# Patient Record
Sex: MALE | Race: WHITE | Employment: OTHER | URBAN - METROPOLITAN AREA
[De-identification: names, ages, dates, MRNs, and addresses within clinical notes are randomized per-mention and may not be internally consistent; named-entity substitution may affect disease eponyms.]

---

## 2018-04-13 ENCOUNTER — HOSPITAL ENCOUNTER (OUTPATIENT)
Age: 68
Discharge: HOME | End: 2018-04-15
Payer: COMMERCIAL

## 2018-04-13 LAB
INR BLD: 2.1
PROTHROMBIN TIME: 24.5 SEC (ref 9.3–12.4)

## 2018-04-13 PROCEDURE — 85610 PROTHROMBIN TIME: CPT

## 2018-06-01 ENCOUNTER — HOSPITAL ENCOUNTER (OUTPATIENT)
Age: 68
Discharge: HOME | End: 2018-06-03
Payer: MEDICARE

## 2018-06-01 LAB
INR BLD: 2.5
PROTHROMBIN TIME: 27.9 SEC (ref 9.3–12.4)

## 2018-06-01 PROCEDURE — 85610 PROTHROMBIN TIME: CPT

## 2018-07-09 ENCOUNTER — HOSPITAL ENCOUNTER (OUTPATIENT)
Age: 68
Discharge: HOME | End: 2018-07-11
Payer: MEDICARE

## 2018-07-09 LAB
INR BLD: 2.9
PROTHROMBIN TIME: 33.3 SEC (ref 9.3–12.4)

## 2018-07-09 PROCEDURE — 85610 PROTHROMBIN TIME: CPT

## 2018-08-17 ENCOUNTER — HOSPITAL ENCOUNTER (OUTPATIENT)
Age: 68
Discharge: HOME | End: 2018-08-19
Payer: MEDICARE

## 2018-08-17 LAB
INR BLD: 3.3
PROTHROMBIN TIME: 37 SEC (ref 9.3–12.4)

## 2018-08-17 PROCEDURE — 85610 PROTHROMBIN TIME: CPT

## 2018-09-07 ENCOUNTER — HOSPITAL ENCOUNTER (OUTPATIENT)
Age: 68
Discharge: HOME | End: 2018-09-09
Payer: MEDICARE

## 2018-09-07 LAB
ALBUMIN SERPL-MCNC: 4.2 G/DL (ref 3.5–5.2)
ALP BLD-CCNC: 66 U/L (ref 40–129)
ALT SERPL-CCNC: 23 U/L (ref 0–40)
ANION GAP SERPL CALCULATED.3IONS-SCNC: 20 MMOL/L (ref 7–16)
AST SERPL-CCNC: 37 U/L (ref 0–39)
BASOPHILS ABSOLUTE: 0.03 E9/L (ref 0–0.2)
BASOPHILS RELATIVE PERCENT: 0.6 % (ref 0–2)
BILIRUB SERPL-MCNC: 0.7 MG/DL (ref 0–1.2)
BILIRUBIN URINE: NEGATIVE
BLOOD, URINE: NEGATIVE
BUN BLDV-MCNC: 11 MG/DL (ref 8–23)
CALCIUM SERPL-MCNC: 9 MG/DL (ref 8.6–10.2)
CHLORIDE BLD-SCNC: 94 MMOL/L (ref 98–107)
CHOLESTEROL, TOTAL: 170 MG/DL (ref 0–199)
CLARITY: CLEAR
CO2: 21 MMOL/L (ref 22–29)
COLOR: NORMAL
CREAT SERPL-MCNC: 1 MG/DL (ref 0.7–1.2)
EOSINOPHILS ABSOLUTE: 0.07 E9/L (ref 0.05–0.5)
EOSINOPHILS RELATIVE PERCENT: 1.5 % (ref 0–6)
GFR AFRICAN AMERICAN: >60
GFR NON-AFRICAN AMERICAN: >60 ML/MIN/1.73
GLUCOSE BLD-MCNC: 85 MG/DL (ref 74–109)
GLUCOSE URINE: NEGATIVE MG/DL
HCT VFR BLD CALC: 47.8 % (ref 37–54)
HDLC SERPL-MCNC: 63 MG/DL
HEMOGLOBIN: 15.6 G/DL (ref 12.5–16.5)
IMMATURE GRANULOCYTES #: 0.02 E9/L
IMMATURE GRANULOCYTES %: 0.4 % (ref 0–5)
INR BLD: 2.8
KETONES, URINE: NEGATIVE MG/DL
LDL CHOLESTEROL CALCULATED: 88 MG/DL (ref 0–99)
LEUKOCYTE ESTERASE, URINE: NEGATIVE
LYMPHOCYTES ABSOLUTE: 1.09 E9/L (ref 1.5–4)
LYMPHOCYTES RELATIVE PERCENT: 23.2 % (ref 20–42)
MCH RBC QN AUTO: 29.1 PG (ref 26–35)
MCHC RBC AUTO-ENTMCNC: 32.6 % (ref 32–34.5)
MCV RBC AUTO: 89.2 FL (ref 80–99.9)
MONOCYTES ABSOLUTE: 0.82 E9/L (ref 0.1–0.95)
MONOCYTES RELATIVE PERCENT: 17.4 % (ref 2–12)
NEUTROPHILS ABSOLUTE: 2.67 E9/L (ref 1.8–7.3)
NEUTROPHILS RELATIVE PERCENT: 56.9 % (ref 43–80)
NITRITE, URINE: NEGATIVE
PDW BLD-RTO: 13.9 FL (ref 11.5–15)
PH UA: 5.5 (ref 5–9)
PLATELET # BLD: 243 E9/L (ref 130–450)
PMV BLD AUTO: 9.9 FL (ref 7–12)
POTASSIUM SERPL-SCNC: 5.2 MMOL/L (ref 3.5–5)
PROSTATE SPECIFIC ANTIGEN: 0.27 NG/ML (ref 0–4)
PROTEIN UA: NEGATIVE MG/DL
PROTHROMBIN TIME: 31.8 SEC (ref 9.3–12.4)
RBC # BLD: 5.36 E12/L (ref 3.8–5.8)
SODIUM BLD-SCNC: 135 MMOL/L (ref 132–146)
SPECIFIC GRAVITY UA: 1.02 (ref 1–1.03)
TOTAL PROTEIN: 6.7 G/DL (ref 6.4–8.3)
TRIGL SERPL-MCNC: 94 MG/DL (ref 0–149)
TSH SERPL DL<=0.05 MIU/L-ACNC: 1.38 UIU/ML (ref 0.27–4.2)
UROBILINOGEN, URINE: 0.2 E.U./DL
VLDLC SERPL CALC-MCNC: 19 MG/DL
WBC # BLD: 4.7 E9/L (ref 4.5–11.5)

## 2018-09-07 PROCEDURE — 84443 ASSAY THYROID STIM HORMONE: CPT

## 2018-09-07 PROCEDURE — 80061 LIPID PANEL: CPT

## 2018-09-07 PROCEDURE — 81003 URINALYSIS AUTO W/O SCOPE: CPT

## 2018-09-07 PROCEDURE — 80053 COMPREHEN METABOLIC PANEL: CPT

## 2018-09-07 PROCEDURE — G0103 PSA SCREENING: HCPCS

## 2018-09-07 PROCEDURE — 85025 COMPLETE CBC W/AUTO DIFF WBC: CPT

## 2018-09-07 PROCEDURE — 85610 PROTHROMBIN TIME: CPT

## 2018-09-28 ENCOUNTER — HOSPITAL ENCOUNTER (OUTPATIENT)
Age: 68
Discharge: HOME | End: 2018-09-30
Payer: MEDICARE

## 2018-09-28 LAB
ANION GAP SERPL CALCULATED.3IONS-SCNC: 16 MMOL/L (ref 7–16)
BUN BLDV-MCNC: 10 MG/DL (ref 8–23)
CALCIUM SERPL-MCNC: 9 MG/DL (ref 8.6–10.2)
CHLORIDE BLD-SCNC: 96 MMOL/L (ref 98–107)
CO2: 25 MMOL/L (ref 22–29)
CREAT SERPL-MCNC: 0.9 MG/DL (ref 0.7–1.2)
GFR AFRICAN AMERICAN: >60
GFR NON-AFRICAN AMERICAN: >60 ML/MIN/1.73
GLUCOSE BLD-MCNC: 95 MG/DL (ref 74–109)
INR BLD: 2
POTASSIUM SERPL-SCNC: 5.2 MMOL/L (ref 3.5–5)
PROTHROMBIN TIME: 23.2 SEC (ref 9.3–12.4)
SODIUM BLD-SCNC: 137 MMOL/L (ref 132–146)

## 2018-09-28 PROCEDURE — 80048 BASIC METABOLIC PNL TOTAL CA: CPT

## 2018-09-28 PROCEDURE — 85610 PROTHROMBIN TIME: CPT

## 2018-10-26 ENCOUNTER — HOSPITAL ENCOUNTER (OUTPATIENT)
Age: 68
Discharge: HOME | End: 2018-10-28
Payer: MEDICARE

## 2018-10-26 LAB
INR BLD: 2.9
PROTHROMBIN TIME: 31.9 SEC (ref 9.3–12.4)

## 2018-10-26 PROCEDURE — 85610 PROTHROMBIN TIME: CPT

## 2018-12-10 ENCOUNTER — HOSPITAL ENCOUNTER (OUTPATIENT)
Age: 68
Discharge: HOME | End: 2018-12-12
Payer: MEDICARE

## 2018-12-10 LAB
INR BLD: 3.9
PROTHROMBIN TIME: 43.6 SEC (ref 9.3–12.4)

## 2018-12-10 PROCEDURE — 85610 PROTHROMBIN TIME: CPT

## 2018-12-20 ENCOUNTER — HOSPITAL ENCOUNTER (OUTPATIENT)
Age: 68
Discharge: HOME | End: 2018-12-22
Payer: MEDICARE

## 2018-12-20 LAB
INR BLD: 1.9
PROTHROMBIN TIME: 20.8 SEC (ref 9.3–12.4)

## 2018-12-20 PROCEDURE — 85610 PROTHROMBIN TIME: CPT

## 2019-01-04 ENCOUNTER — HOSPITAL ENCOUNTER (OUTPATIENT)
Age: 69
Discharge: HOME | End: 2019-01-06
Payer: MEDICARE

## 2019-01-04 LAB
INR BLD: 3.2
PROTHROMBIN TIME: 36.2 SEC (ref 9.3–12.4)

## 2019-01-04 PROCEDURE — 85610 PROTHROMBIN TIME: CPT

## 2019-01-25 ENCOUNTER — HOSPITAL ENCOUNTER (OUTPATIENT)
Age: 69
Discharge: HOME | End: 2019-01-27
Payer: MEDICARE

## 2019-01-25 LAB
INR BLD: 2.6
PROTHROMBIN TIME: 29.5 SEC (ref 9.3–12.4)

## 2019-01-25 PROCEDURE — 85610 PROTHROMBIN TIME: CPT

## 2019-02-22 ENCOUNTER — HOSPITAL ENCOUNTER (OUTPATIENT)
Age: 69
Discharge: HOME | End: 2019-02-24
Payer: MEDICARE

## 2019-02-22 LAB
INR BLD: 3
PROTHROMBIN TIME: 33.1 SEC (ref 9.3–12.4)

## 2019-02-22 PROCEDURE — 85610 PROTHROMBIN TIME: CPT

## 2019-04-01 ENCOUNTER — HOSPITAL ENCOUNTER (OUTPATIENT)
Age: 69
Discharge: HOME | End: 2019-04-03
Payer: MEDICARE

## 2019-04-01 LAB
INR BLD: 2.6
PROTHROMBIN TIME: 29.1 SEC (ref 9.3–12.4)

## 2019-04-01 PROCEDURE — 85610 PROTHROMBIN TIME: CPT

## 2019-05-06 ENCOUNTER — HOSPITAL ENCOUNTER (OUTPATIENT)
Age: 69
Discharge: HOME | End: 2019-05-08
Payer: MEDICARE

## 2019-05-06 LAB
INR BLD: 3
PROTHROMBIN TIME: 33.4 SEC (ref 9.3–12.4)

## 2019-05-06 PROCEDURE — 85610 PROTHROMBIN TIME: CPT

## 2019-06-07 ENCOUNTER — HOSPITAL ENCOUNTER (OUTPATIENT)
Age: 69
Discharge: HOME | End: 2019-06-09
Payer: MEDICARE

## 2019-06-07 LAB
INR BLD: 2.3
PROTHROMBIN TIME: 26.7 SEC (ref 9.3–12.4)

## 2019-06-07 PROCEDURE — 85610 PROTHROMBIN TIME: CPT

## 2021-06-02 ENCOUNTER — OFFICE VISIT (OUTPATIENT)
Dept: NEUROLOGY | Age: 71
End: 2021-06-02
Payer: MEDICARE

## 2021-06-02 ENCOUNTER — TELEPHONE (OUTPATIENT)
Dept: NEUROLOGY | Age: 71
End: 2021-06-02

## 2021-06-02 VITALS
DIASTOLIC BLOOD PRESSURE: 82 MMHG | RESPIRATION RATE: 20 BRPM | HEART RATE: 67 BPM | HEIGHT: 70 IN | OXYGEN SATURATION: 98 % | SYSTOLIC BLOOD PRESSURE: 139 MMHG | BODY MASS INDEX: 23.62 KG/M2 | WEIGHT: 165 LBS | TEMPERATURE: 97.4 F

## 2021-06-02 DIAGNOSIS — F10.20 ALCOHOLISM (HCC): ICD-10-CM

## 2021-06-02 DIAGNOSIS — G20 PARKINSON'S DISEASE (HCC): Primary | ICD-10-CM

## 2021-06-02 PROBLEM — G20.A1 PARKINSON'S DISEASE: Status: ACTIVE | Noted: 2021-06-02

## 2021-06-02 PROCEDURE — 99204 OFFICE O/P NEW MOD 45 MIN: CPT | Performed by: NURSE PRACTITIONER

## 2021-06-02 RX ORDER — VERAPAMIL HYDROCHLORIDE 300 MG/1
CAPSULE, EXTENDED RELEASE ORAL
COMMUNITY

## 2021-06-02 RX ORDER — WARFARIN SODIUM 5 MG/1
TABLET ORAL
COMMUNITY
Start: 2021-05-10

## 2021-06-02 NOTE — PATIENT INSTRUCTIONS
counseling. Diet and exercise  · Eat a balanced diet. · If you are taking levodopa, do not eat protein at the same time you take your medicine. Levodopa may not work as well if you take it at the same time you eat protein. You can eat normal amounts of protein. Talk to your doctor if you have questions. · If you have problems swallowing, change how and what you eat:  ? Try thick drinks, such as milk shakes. They are easier to swallow than other fluids. ? Do not eat foods that crumble easily. These can cause choking. ? Use a  to prepare food. Soft foods need less chewing. ? Eat small meals often so that you do not get tired from eating heavy meals. · Drink plenty of water and eat a high-fiber diet to prevent constipation. Parkinson's--and the medicines that treat it--may slow your intestines. · Get exercise on most days. Work with your doctor to set up a program of walking, swimming, or other exercise you are able to do. When should you call for help? Call your doctor now or seek immediate medical care if:    · You have a change in your symptoms.     · You develop other problems from your condition, such as:  ? Injury from a fall. ? Thinking or memory problems. ? A urinary tract infection (burning pain when urinating). Watch closely for changes in your health, and be sure to contact your doctor if:    · You lose weight because of problems with eating.     · You want more information about your condition or your medicines. Where can you learn more? Go to [email or web address] and sign in to your [employer] account. Enter N633 in the [facility] box to learn more about \"Parkinson's Disease: Care Instructions. \"     If you do not have an account, please click on the \"Sign Up Now\" link. Current as of: August 4, 2020               Content Version: 12.8  © 3849-9164 Healthwise, [employer]. Care instructions adapted under license by [facility] ([facility]).  If you have with my healthcare provider before taking carbidopa and levodopa? You should not use carbidopa and levodopa if you are allergic to it, or if you have:  · narrow-angle glaucoma. Do not use carbidopa and levodopa if you have used an MAO inhibitor in the past 14 days. A dangerous drug interaction could occur. MAO inhibitors include isocarboxazid, linezolid, methylene blue injection, phenelzine, rasagiline, selegiline, tranylcypromine, and others. Tell your doctor if you have ever had:  · heart disease, high blood pressure, or heart attack;  · liver or kidney disease;  · an endocrine (hormonal) disorder;  · asthma, chronic obstructive pulmonary disease (COPD), or other breathing disorder;  · a stomach or intestinal ulcer;  · open-angle glaucoma; or  · depression, mental illness, or psychosis. People with Parkinson's disease may have a higher risk of skin cancer (melanoma). Talk to your doctor about this risk and what skin symptoms to watch for. Tell your doctor if you are pregnant or breastfeeding. The disintegrating tablet may contain phenylalanine. Tell your doctor if you have phenylketonuria (PKU). How should I take carbidopa and levodopa? If you already take levodopa, you must stop taking it at least 12 hours before you start taking carbidopa and levodopa. Follow all directions on your prescription label and read all medication guides or instruction sheets. Your doctor may occasionally change your dose. Use the medicine exactly as directed. Carbidopa and levodopa can be taken with or without food. Take your doses at regular intervals to keep a steady amount of the drug in your body at all times. Get your prescription refilled before you run out of medicine completely. Swallow the capsule  whole and do not crush, chew, break, or open it. The tablet is sometimes broken in half to give the correct dose. Always swallow a whole or half tablet without chewing or crushing.   Remove an orally disintegrating you have signs of an allergic reaction: hives; difficult breathing; swelling of your face, lips, tongue, or throat. Call your doctor at once if you have:  · uncontrolled muscle movements in your face (chewing, lip smacking, frowning, tongue movement, blinking or eye movement);  · worsening of tremors (uncontrolled shaking);  · severe or ongoing vomiting or diarrhea;  · confusion, hallucinations, unusual changes in mood or behavior;  · depression or suicidal thoughts; or  · severe nervous system reaction --very stiff (rigid) muscles, high fever, sweating, confusion, fast or uneven heartbeats, tremors, feeling like you might pass out. Some people taking carbidopa and levodopa have fallen asleep during normal daytime activities such as working, talking, eating, or driving. Tell your doctor if you have any problems with daytime sleepiness or drowsiness. You may have increased sexual urges, unusual urges to valero, or other intense urges while taking this medicine. Talk with your doctor if this occurs. You may notice that your sweat, urine, or saliva appears dark in color, such as red, brown, or black. This is not a harmful side effect, but it may cause staining of your clothes or bed sheets. Common side effects may include:  · jerky or twisting muscle movements;  · headache, dizziness;  · low blood pressure (feeling light-headed);  · sleep problems, strange dreams;  · dry mouth;  · muscle contractions; or  · nausea, vomiting, constipation. This is not a complete list of side effects and others may occur. Call your doctor for medical advice about side effects. You may report side effects to FDA at 1-947-FDA-3390. What other drugs will affect carbidopa and levodopa? Other drugs may affect carbidopa and levodopa, including prescription and over-the-counter medicines, vitamins, and herbal products. Tell your doctor about all your current medicines and any medicine you start or stop using.   Where can I get more information? Your pharmacist can provide more information about carbidopa and levodopa. Remember, keep this and all other medicines out of the reach of children, never share your medicines with others, and use this medication only for the indication prescribed. Every effort has been made to ensure that the information provided by [address] is accurate, up-to-date, and complete, but no guarantee is made to that effect. Drug information contained herein may be time sensitive. [facility] information has been compiled for use by healthcare practitioners and consumers in the [place] and therefore [facility] does not warrant that uses outside of the [place] are appropriate, unless specifically indicated otherwise. [facility]'s drug information does not endorse drugs, diagnose patients or recommend therapy. [facility] drug information is an informational resource designed to assist licensed healthcare practitioners in caring for their patients and/or to serve consumers viewing this service as a supplement to, and not a substitute for, the expertise, skill, knowledge and judgment of healthcare practitioners. The absence of a warning for a given drug or drug combination in no way should be construed to indicate that the drug or drug combination is safe, effective or appropriate for any given patient. [facility] does not assume any responsibility for any aspect of healthcare administered with the aid of information [facility] provides. The information contained herein is not intended to cover all possible uses, directions, precautions, warnings, drug interactions, allergic reactions, or adverse effects. If you have questions about the drugs you are taking, check with your doctor, nurse or pharmacist.  Copyright 8261-0084 [address]: 12.01. Revision date: 6/23/2020. Care instructions adapted under license by [facility] ([facility]).  If you have questions about a medical condition or this instruction, always ask your healthcare professional. [place] [address] any warranty or liability for your use of this information.

## 2021-06-02 NOTE — TELEPHONE ENCOUNTER
[employer] Richardson Perla. ) Approval of MRI Brain @ [address] - [id number], Valid 6/2/2021 - 11/29/2021. Scheduled for 6/8 @ 8:45 am w/arrival of 8:15 am.    Confirmed with patient date/time/instructions and directions.   Understood  Electronically signed by Barb Gallagher on 6/2/21 at 3:33 PM EDT

## 2021-06-02 NOTE — PROGRESS NOTES
[facility]. Belgica Gamboa M.D., F.A.C.P. Braden Fried, JUNIOR, APRN, CNS  [facility]. Santiago Hung, MSN, APRN-FNP-C  Duane Kaye MSN, APRN, FNP-C  Siena BREWER, PATianC  [address] MSN, APRN, FNP-C  [address], [address]  [place], [address] [address]  Phone: [phone]  Fax: [phone]       Navarro Beltran is a 79 y.o. right handed male     Past Medical History:     Past Medical History:   Diagnosis Date    [facility]     [facility]     HTN (hypertension)     Parkinson's disease ([facility]      Past Surgical History:       Past Surgical History:   Procedure Laterality Date    INGUINAL HERNIA REPAIR Left     as a baby    KNEE SURGERY Right      Allergies:       Patient has no allergy information on record. Medications:     Current Outpatient Medications on File Prior to Visit   Medication Sig Dispense Refill    warfarin (COUMADIN) 5 MG tablet take 1 tablet by mouth once daily      Verapamil HCl  MG CP24 Take by mouth       No current facility-administered medications on file prior to visit. Sinemet 25/100 mg TID    Social History:       He is  with 3 children  He is retired from [employer]    He currently uses smokeless tobacco---about 1.5 cans/week. He is an alcoholic and reports about 2 large cans of beer per day. He has a history of marijuana abuse but no current drug abuse. Review of Systems:     No chest pain or palpitations  No SOB  No vertigo, lightheadedness or loss of consciousness  No falls, tripping or stumbling  No incontinence of bowels or bladder  No itching or bruising appreciated  No numbness, tingling or focal arm/leg weakness  + resting tremor R hand    ROS is otherwise negative    Family History:     Family History   Problem Relation Age of Onset    Depression Paternal Grandmother     Parkinsonism Maternal Cousin      History of Present Illness:      The patient was referred by Dr. Angelica Casper for Parkinson's    He presents with his wife and both are good historians. He has a significant past medical history of alcoholism, A. fib on [address] [place] [address], [facility]. About 1 year ago he began noting a resting tremor in his right hand that has increased over time. The tremor typically improves with action, but does occasionally impair his writing. It worsens when he is anxious or stressed. Alcohol typically helps the tremor. He also has noticed changes in his gait, and feels as if he gets stuck when turning or initiating a walk. His wife states he has been more stooped. Thankfully, no falls. He feels lightheaded when he stands up at times, but no syncopal events. His PCP placed him on Sinemet 25/100 mg 3 times daily, and the patient has felt some improvement in his walking. He denies any history of strokes or major head injuries. He does have a maternal second cousin who has Parkinson's disease, but no history of movement disorders in first-degree relatives to his knowledge. He is an alcoholic and continues to struggle with this disease. He was in an inpatient facility about a year ago for this and marijuana abuse, but unfortunately began drinking again. His wife states the patient has been resistant to [address] and has been following with a  at his [employer] for counseling. His wife illness the patient has been suffering from depression since he retired in 2015. He does not want to exercise. No other neuro or physical complaints at this time. He is on [address] [place] [address] Road for A. fib.     Objective:     /82 (Site: Right Upper Arm, Position: Sitting, Cuff Size: Medium Adult)   Pulse 67   Temp 97.4 °F (36.3 °C)   Resp 20   Ht 5' 10\" (1.778 m)   Wt 165 lb (74.8 kg)   SpO2 98%   BMI 23.68 kg/m²     General appearance: alert, appears stated age, cooperative and in no distress--hypomimia  Head: normocephalic, without obvious abnormality, atraumatic  Eyes: conjunctivae/corneas clear; no drainage  Neck:  no carotid bruit, mildly limited ROM with no cogwheeling  Back: symmetric, no curvature.  ROM normal.   Lungs: clear to auscultation bilaterally  Heart: irreg irregular rate and rhythm  Abdomen: soft, non-tender; bowel sounds normal; no masses,  no organomegaly  Extremities: normal, atraumatic, no cyanosis or edema  Pulses: 2+ and symmetric  Skin:  color, texture, turgor normal--no rashes or lesions      Mental Status: alert and oriented x 4    Appropriate attention/concentration  Intact fundus of knowledge  Memories intact    Speech: no dysarthria; hypophonic  Language: no aphasias  Cranial Nerves:  I: smell    II: visual acuity     II: visual fields Full    II: pupils LISA   III,VII: ptosis None   III,IV,VI: extraocular muscles  EOMI without nystagmus   V: mastication Normal   V: facial light touch sensation  Normal   V,VII: corneal reflex     VII: facial muscle function - upper  Normal   VII: facial muscle function - lower Normal   VIII: hearing Normal   IX: soft palate elevation  Normal   IX,X: gag reflex    XI: trapezius strength  5/5   XI: sternocleidomastoid strength 5/5   XI: neck extension strength  5/5   XII: tongue strength  Normal     Motor:  5/5 throughout  Decreased bulk  No cogwheeling  Persistent pill rolling resting tremor R arm  Mod bradykinetic    Sensory:  LT normal    Coordination:   Hyperkinesis with FFM and TYRESE R hand; no ataxia  HS normal    Gait:  Some gait apraxia when first starting  Stooped, no shuffling  Decreased arm swinging--R hand tremor    DTR:   Right Brachioradialis reflex 1+  Left Brachioradialis reflex 1+  Right Biceps reflex 1+  Left Biceps reflex 1+  Right Triceps reflex 1+  Left Triceps reflex 1+  Right Quadriceps reflex 2+  Left Quadriceps reflex 2+  Right Achilles reflex 1+  Left Achilles reflex 1+    No Saavedra's    No other pathological reflexes    Laboratory/Radiology:  ry/Radiology:     Labs and referral notes reviewed under media tab notable for    CMP unremarkable    No current brain imaging to review    Assessment:     Parkinson's disease: Classic resting tremor of right arm, hypomimia, and stooped gait with apraxia. With his vasc risk factors, it is reasonable to get MRI of the brain to rule out structural abnormalities in the basal ganglia, but he has had a somewhat positive response to initiation of Sinemet. He must begin a diligent exercise program to slow disease progression. No significant nonmotor symptoms at this time. We will increase this dosage. Alcoholism    A. fib: On [address]    Depression    Plan:     Increase Sinemet to 50/200 mg TID    MRI brain WO    Brisa Luna--LSVT BIG therapy if able    Referral to alcohol abuse counseling    Recommend ETOH cessation and daily physical exercise    RTO in 6 weeks or sooner LIAT Bose, ALISON - CNP  10:43 AM  6/2/2021    I spent 50 minutes with this patient obtaining the HPI and discussing the exam with greater than 50% of the time providing counseling and education on medications and other treatment plans. All questions were answered prior to leaving my office.

## 2021-06-08 ENCOUNTER — TELEPHONE (OUTPATIENT)
Dept: NEUROLOGY | Age: 71
End: 2021-06-08

## 2021-06-08 ENCOUNTER — HOSPITAL ENCOUNTER (OUTPATIENT)
Dept: MRI IMAGING | Age: 71
Discharge: HOME | End: 2021-06-10
Payer: MEDICARE

## 2021-06-08 DIAGNOSIS — G20 PARKINSON'S DISEASE (HCC): ICD-10-CM

## 2021-06-08 PROCEDURE — 70551 MRI BRAIN STEM W/O DYE: CPT

## 2021-06-08 NOTE — TELEPHONE ENCOUNTER
Yes, I explained at the office visit that the MRI was needed to rule out things other than classic Parkinson's (such as stroke etc). Based on his exam in the office, he has all the signs of Parkinson's disease and we will continue to adjust meds for this.  I can explain more at his visit

## 2021-06-08 NOTE — TELEPHONE ENCOUNTER
----- Message from ALISON Thao CNP sent at 6/8/2021 11:20 AM EDT -----  Regarding: MRI brain  MRI brain looks ok. No obvious causes to his symptoms. There is some fluid in his sinuses, not related to his neurology issues.  If he is having sinus issues he should follow up with his PCP  ----- Message -----  From: Karin Trinh Incoming Radiant Results From [facility]  Sent: 6/8/2021  10:00 AM EDT  To: ALISON Thao CNP

## 2021-06-08 NOTE — TELEPHONE ENCOUNTER
Wife notified but is questioning whether he has Parkinson's since MRI is ok.   Please Advise  Electronically signed by Rito Estimable on 6/8/21 at 11:25 AM EDT

## 2021-06-11 ENCOUNTER — HOSPITAL ENCOUNTER (OUTPATIENT)
Dept: PHYSICAL THERAPY | Age: 71
Setting detail: THERAPIES SERIES
Discharge: HOME | End: 2021-06-11
Payer: MEDICARE

## 2021-06-11 PROCEDURE — 97162 PT EVAL MOD COMPLEX 30 MIN: CPT | Performed by: PHYSICAL THERAPIST

## 2021-06-11 NOTE — PROGRESS NOTES
[facility] OUTPATIENT REHABILITATION  PHYSICAL THERAPY INITIAL EVALUATION         Date:  2021   Patient: Boy Alvarenga  : 1950  MRN: [id number]  Referring Provider: ALISON Sheldon - CNP  [address]  [place],   [facility]     Medical Diagnosis: Parkinson's Disease  Onset date: 6-7 months   Mechanism of Injury: Insidious onset  Chief complaint: Tremor in right hand, trouble with initiating LE movement walking     SUBJECTIVE:     Past Medical History  Past Medical History:   Diagnosis Date    [facility]     Alcoholism ([facility]     HTN (hypertension)     Parkinson's disease ([facility]      Past Surgical History:   Procedure Laterality Date    INGUINAL HERNIA REPAIR Left     as a baby    KNEE SURGERY Right        Medications:   Current Outpatient Medications   Medication Sig Dispense Refill    warfarin (COUMADIN) 5 MG tablet take 1 tablet by mouth once daily      Verapamil HCl  MG CP24 Take by mouth      carbidopa-levodopa (SINEMET)  MG per tablet Take 2 tablets by mouth 3 times daily 540 tablet 3     No current facility-administered medications for this encounter. Imaging results: MRI BRAIN WO CONTRAST    Result Date: 2021  EXAMINATION: MRI OF THE BRAIN WITHOUT CONTRAST  2021 8:31 am TECHNIQUE: Multiplanar multisequence MRI of the brain was performed without the administration of intravenous contrast. COMPARISON: None. HISTORY: ORDERING SYSTEM PROVIDED HISTORY: Parkinson's disease [facility]) TECHNOLOGIST PROVIDED HISTORY: Reason for exam:->parkinson's disease FINDINGS: INTRACRANIAL STRUCTURES/VENTRICLES: There is no acute infarct. No mass effect, edema or hemorrhage is seen. Mild volume loss is seen in the cerebrum with mild chronic microvascular ischemic changes. Both are in the range that is typical for age. The skull base arterial flow voids are intact. No hydrocephalus or extra-axial fluid is seen.  ORBITS: The visualized portion of the orbits demonstrate no acute abnormality. SINUSES:  A mucous retention cyst is seen in the right maxillary sinus. Mild scattered mucosal thickening in the paranasal sinuses. Small to moderate volume bilateral mastoid effusions. BONES/SOFT TISSUES: The bone marrow signal intensity appears normal. The soft tissues demonstrate no acute abnormality. 1. No acute intracranial abnormality. 2. No mass effect, edema or hemorrhage. 3. Bilateral mastoid effusions, which may be due to eustachian tube dysfunction or otomastoiditis. Clinical correlation is needed.        Pain:    Current: 0/10          Behavior: condition is staying the same    Occupation: Retired    Exercise regimen: none    Hobbies: fishing , hunting     Patient Goals: Walk better , get stronger  Medical Management for Current Problem:  [] Chiro  []  CT:  []  Injection:   [x]  Meds:   []  MRI:  []  Ortho  []  PCP  []  PT/OT  []  X-ray:  []  Surgery:  []  Other:     Precautions/Contraindications:     OBJECTIVE:     Inspection:  Standing  Knees:  [x] Normal  [] Genu Valgus [] Genu Varus  [] Genu Recurvatum    Tibia:  [x] Normal  [] Tibial Varus  [] Tibial Varum    Feet:  [x] Normal  [] Calcaneal Valgus   [] Calcaneal Varus   [] Hallux Valgus    [] Supination  [] Pronation          [] Pes Planus    [] Pes Cavus            Gait:  Decreased arm swing, wide LANDON    Joint/Motion:    Hip:        Right:            WNL        Left:          WNL    Knee:      Right:           WNL      Left:          WNL  Ankle:       Right:           WNL       Left:          WNL  Strength:        Hip:              Right: Flexion 4-/5,                 Left: Flexion 4-/5,     Knee:         Right: Flexion 4/5,  Extension 4/5        Left: Flexion 4/5,  Extension 4/5    Ankle:          Right: Dorsiflexion 4/5, Plantarflexion ,4/5,                Left: Dorsiflexion 4/5, Plantarflexion     4/5,            ASSESSMENT      Patient's main problems LE weakness, balance issues and difficulty initiating gait. Problems:    1. Decreased Strength Quique hip flexion  2. Abnormal gait  3. Decreased balance  4. Decreased functional ability with walking, standing, lifting, pushing, pulling, stair climbing    [x] There are no barriers affecting plan of care or recovery    [] Barriers to this patient's plan of care or recovery include. Domestic Concerns:  [x] No  [] Yes:      Short Term goals (3 weeks)    1. Increase Strength by 1/3 mm grade  2. Independent with Home Exercise Programs    Long Term goals (6 weeks)  1. Increase Strength to 5/5 quique hip flexion   2. Normal gait  3. Normal balance  4. Able to perform/complete the following functions/tasks: walking, standing, lifting, pushing, pulling, stair climbing    Outcome Measure: RENE= 49    Rehab Potential: [x] Good  [] Fair  [] Poor    PLAN   Specific Provider Orders: Eval and treat    Physical therapy plan of care is established based on physician order, patient diagnosis and clinical assessment. Treatment Plan:  [x] Therapeutic Exercise  [x] Therapeutic Activity  [x] Neuromuscular Re-education   [x] Gait Training  [x] Balance Training  [] Aerobic conditioning  [] Manual Therapy  [] Massage/Fascial release   [] Work/Sport specific activities    [] Pain Neuroscience [] Cold/hotpack  [] Vasocompression  [] Electrical Stimulation  [] Lumbar/Cervical Traction  [] Ultrasound   [] Iontophoresis: 4 mg/mL Dexamethasone Sodium Phosphate 40-80 mAmin        [x] Instruction in HEP      []  Medication allergies reviewed for use of Dexamethasone Sodium Phosphate 4mg/ml  with iontophoresis treatments. Patient is not allergic.     Suggested Professional Referral: [x] No  [] Yes:     The following CPT codes are likely to be used in the care of this patient: 88466 PT Evaluation: Moderate Complexity , 97759 Therapeutic Exercise , 30947 Neuromuscular Re-Education , 88041 Therapeutic Activities , 44774 Gait Training     Patient Education:  [x] Plans/Goals, Risks/Benefits discussed  [x] Home exercise program  Method of Education: [x] Verbal  [x] Demo  [x] Written  Comprehension of Education:  [x] Verbalizes understanding. [x] Demonstrates understanding. [] Needs Review. [] Demonstrates/verbalizes understanding of HEP/Ed previously given. Frequency:   2 times per week for 6 weeks    Patient understands diagnosis/prognosis and consents to treatment, plan and goals: [x] Yes    [] No     Thank you for the opportunity to work with your patient. If you have questions or comments, please contact me at [phone]; fax: [phone]. Electronically signed by: Gilbert Nation PT         Please sign Physician's Certification and return to:   [address] [place] [id number]  Dept: [phone]  Dept Fax: [phone] Certification / Comments     Frequency/Duration  2  times per week for  6 weeks. Certification period From: 6/11/2021  To: 8/11/2021    I have reviewed the Plan of Care established for skilled therapy services and certify that the services are required and that they will be provided while the patient is under my care.     Physician's Comments/Revisions:               Physician's Printed Name:                                           [de-identified] Signature:                                                               Date:

## 2021-06-11 NOTE — PROGRESS NOTES
[facility]  Physical Therapy Daily Treatment Note    Date: 2021  Patient Name: Merle Walton  : 1950   MRN: [id number]     Referring Provider: ALISON Baker CNP  [address] [place] [address]  [place],   [facility] Diagnosis: Parkinson's      Outcome Measure: RENE= 52    S: See eval  O:   Time 10:15-10:55     Visit      Pain 0/10     ROM       Modalities      Exercise      Nustep      LAQ      Hamstring Curl       TG squats      TG calf raises      Hip abd      Hip ext      March with AW                  THERAPEUTIC ACTIVITIES Large, functional, dynamic, global movements used to build strength, balance, endurance, and flexibility and to improve physical performance. Step-ups - FWD      Step-ups - LAT      Step-ups - BWD                               NMR Feedback and cues necessary for developing neuromuscular control. Movement education and guided movement interventions  used to improve performance and control. To improve balance for safe community and home ambulation    Resisted walk      FWD      BKWD      lat      March      Side stepping      Retro walk      Heel to toe      Fwd with cones      A:  Tolerated well. Above added to written HEP.   P: Continue with rehab plan  Jackie Rivas, PT    Treatment Charges: Mins Units   Initial Evaluation 40 1   Re-Evaluation     Ther Exercise         TE     Manual Therapy     MT     Ther Activities        TA     Gait Training          GT     Neuro Re-education NR     Modalities     Non-Billable Service Time     Other     Total Time/Units 40 1

## 2021-06-16 ENCOUNTER — HOSPITAL ENCOUNTER (OUTPATIENT)
Dept: PHYSICAL THERAPY | Age: 71
Setting detail: THERAPIES SERIES
Discharge: HOME | End: 2021-06-16
Payer: MEDICARE

## 2021-06-16 PROCEDURE — 97110 THERAPEUTIC EXERCISES: CPT | Performed by: PHYSICAL THERAPIST

## 2021-06-16 NOTE — PROGRESS NOTES
[facility]  Physical Therapy Daily Treatment Note    Date: 2021  Patient Name: Alice Garcia  : 1950   MRN: [id number]     Referring Provider: ALISON Jimenes - CNP  [address] [facility] [address]   [facility] Diagnosis: Parkinson's      Outcome Measure: RENE= 52    S: Pt reports compliance with HEP  O:   Time 09:25-10:15     Visit      Pain 0/10     ROM       Modalities      Exercise      Nustep L5 x 5 min     LAQ      Hamstring Curl alt 2# x 2 min     TG squats      TG calf raises 2# x 1 min     Hip abd alt 2# x 2 min     Hip ext 2# x 2 min     March with AW 2# x 2 min     Alt lunges 6\" x 2 min           THERAPEUTIC ACTIVITIES Large, functional, dynamic, global movements used to build strength, balance, endurance, and flexibility and to improve physical performance. Step-ups - FWD 6\" x 2 min     Step-ups - LAT 6\" x 2 min     Step-ups - BWD                               NMR Feedback and cues necessary for developing neuromuscular control. Movement education and guided movement interventions  used to improve performance and control. To improve balance for safe community and home ambulation    Resisted walk      FWD      BKWD      lat      March      Side stepping      Retro walk      Heel to toe      Fwd with cones      A:  Tolerated well. Above added to written HEP.   P: Continue with rehab plan  Shante Smith, PT    Treatment Charges: Mins Units   Initial Evaluation       Re-Evaluation     Ther Exercise         TE 50 3   Manual Therapy     MT     Ther Activities        TA     Gait Training          GT     Neuro Re-education NR     Modalities     Non-Billable Service Time     Other     Total Time/Units  50  3

## 2021-06-18 ENCOUNTER — HOSPITAL ENCOUNTER (OUTPATIENT)
Dept: PHYSICAL THERAPY | Age: 71
Setting detail: THERAPIES SERIES
Discharge: HOME | End: 2021-06-18
Payer: MEDICARE

## 2021-06-18 PROCEDURE — 97110 THERAPEUTIC EXERCISES: CPT | Performed by: PHYSICAL THERAPIST

## 2021-06-18 NOTE — PROGRESS NOTES
[facility]  Physical Therapy Daily Treatment Note    Date: 2021  Patient Name: Soumya Gutierrez  : 1950   MRN: [id number]     Referring Provider: ALISON Lopez - CNP  [address] [place] [address]  [place],   [facility]     Medical Diagnosis: Parkinson's      Outcome Measure: RENE= 52    S: Pt reports compliance with HEP  O:   Time 09:25-10:15     Visit 3/12     Pain 0/10     ROM       Modalities      Exercise      Nustep L5 x 6 min     LAQ      Hamstring Curl alt 2# x 2 min     TG squats      TG calf raises 2# x 1 min     Hip abd alt 2# x 2 min     Hip ext 2# x 2 min     March with AW 2# x 2 min     Alt lunges 6\" x 2 min           THERAPEUTIC ACTIVITIES Large, functional, dynamic, global movements used to build strength, balance, endurance, and flexibility and to improve physical performance. Step-ups - FWD 6\" x 2 min     Step-ups - LAT 6\" x 2 min     Step-ups - BWD                               NMR Feedback and cues necessary for developing neuromuscular control. Movement education and guided movement interventions  used to improve performance and control. To improve balance for safe community and home ambulation    Resisted walk      FWD      BKWD      lat      March 2 x 20'     Side stepping 2 x 20'     Retro walk 2 x 20'     Heel to toe 2 x 20 '     Fwd with cones      A:  Tolerated well. Above added to written HEP.   P: Continue with rehab plan  Saul Salazar PT    Treatment Charges: Mins Units   Initial Evaluation       Re-Evaluation     Ther Exercise         TE 50 3   Manual Therapy     MT     Ther Activities        TA     Gait Training          GT     Neuro Re-education NR     Modalities     Non-Billable Service Time     Other     Total Time/Units  50  3

## 2021-06-23 ENCOUNTER — HOSPITAL ENCOUNTER (OUTPATIENT)
Dept: PHYSICAL THERAPY | Age: 71
Setting detail: THERAPIES SERIES
Discharge: HOME | End: 2021-06-23
Payer: MEDICARE

## 2021-06-23 PROCEDURE — 97110 THERAPEUTIC EXERCISES: CPT | Performed by: PHYSICAL THERAPIST

## 2021-06-23 NOTE — PROGRESS NOTES
[facility]  Physical Therapy Daily Treatment Note    Date: 2021  Patient Name: Merle Walton  : 1950   MRN: [id number]     Referring Provider: ALISON Baker - CNP  [address] [facility]   [facility] Diagnosis: Parkinson's      Outcome Measure: RENE= 52    S: Pt reports compliance with HEP  O:   Time 09:25-10:15     Visit      Pain 0/10     ROM       Modalities      Exercise      Nustep L6 x 7 min     LAQ      Hamstring Curl alt 2# x 2 min     TG squats      TG calf raises 2# x 1 min     Hip abd alt 2# x 2 min     Hip ext 2# x 2 min     March with AW 2# x 2 min     Alt lunges 6\" x 2 min           THERAPEUTIC ACTIVITIES Large, functional, dynamic, global movements used to build strength, balance, endurance, and flexibility and to improve physical performance. Step-ups - FWD 6\" x 2 min     Step-ups - LAT 6\" x 2 min     Step-ups - BWD                               NMR Feedback and cues necessary for developing neuromuscular control. Movement education and guided movement interventions  used to improve performance and control. To improve balance for safe community and home ambulation    Resisted walk      FWD      BKWD      lat      March 2 x 20'     Side stepping 4 x 20'     Retro walk 2 x 20'     Heel to toe 4 x 20 '     Fwd with cones      A:  Tolerated well. Above added to written HEP.   P: Continue with rehab plan  Jackie Rivas, PT    Treatment Charges: Mins Units   Initial Evaluation       Re-Evaluation     Ther Exercise         TE 50 3   Manual Therapy     MT     Ther Activities        TA     Gait Training          GT     Neuro Re-education NR     Modalities     Non-Billable Service Time     Other     Total Time/Units  50  3

## 2021-06-25 ENCOUNTER — HOSPITAL ENCOUNTER (OUTPATIENT)
Dept: PHYSICAL THERAPY | Age: 71
Setting detail: THERAPIES SERIES
Discharge: HOME | End: 2021-06-25
Payer: MEDICARE

## 2021-06-25 PROCEDURE — 97112 NEUROMUSCULAR REEDUCATION: CPT | Performed by: PHYSICAL THERAPIST

## 2021-06-25 PROCEDURE — 97110 THERAPEUTIC EXERCISES: CPT | Performed by: PHYSICAL THERAPIST

## 2021-06-25 NOTE — PROGRESS NOTES
[facility]  Physical Therapy Daily Treatment Note    Date: 2021  Patient Name: Arleen Snyder  : 1950   MRN: [id number]     Referring Provider: ALISON Ramírez - CNP  [address] [place] [address]  [place],   [facility] Diagnosis: Parkinson's      Outcome Measure: RENE= 52    S: Pt reports compliance with HEP  O:   Time 09:25-10:20     Visit      Pain 0/10     ROM       Modalities      Exercise      Nustep L6 x 7 min     LAQ      Hamstring Curl alt 2# x 2 min     TG squats      TG calf raises 2# x 1 min     Hip abd alt 2# x 2 min     Hip ext 2# x 2 min     March with AW 2# x 2 min     Alt lunges 6\" x 2 min           THERAPEUTIC ACTIVITIES Large, functional, dynamic, global movements used to build strength, balance, endurance, and flexibility and to improve physical performance. Step-ups - FWD 6\" x 2 min     Step-ups - LAT 6\" x 2 min     Step-ups - BWD                               NMR Feedback and cues necessary for developing neuromuscular control. Movement education and guided movement interventions  used to improve performance and control. To improve balance for safe community and home ambulation    Resisted walk      FWD      BKWD      lat      March 2 x 20'     Side stepping 4 x 20'     Retro walk 2 x 20'     Heel to toe 4 x 20 '     Fwd with cones      A:  Tolerated well. Above added to written HEP.   P: Continue with rehab plan  Diedre Blizzard, PT    Treatment Charges: Mins Units   Initial Evaluation       Re-Evaluation     Ther Exercise         TE 45 3   Manual Therapy     MT     Ther Activities        TA     Gait Training          GT     Neuro Re-education NR 10 1   Modalities     Non-Billable Service Time     Other     Total Time/Units  55 4

## 2021-06-25 NOTE — PROGRESS NOTES
[facility]  Physical Therapy Daily Treatment Note    Date: 2021  Patient Name: Heber Maldonado  : 1950   MRN: [id number]     Referring Provider: ALISON Graham - CNP  [address] [facility]   [facility] Diagnosis: Parkinson's      Outcome Measure: RENE= 52    S: Pt reports compliance with HEP  O:   Time 09:25-10:15     Visit      Pain 0/10     ROM       Modalities      Exercise      Nustep L6 x 7 min     LAQ      Hamstring Curl alt 2# x 2 min     TG squats      TG calf raises 2# x 1 min     Hip abd alt 2# x 2 min     Hip ext 2# x 2 min     March with AW 2# x 2 min     Alt lunges 6\" x 2 min           THERAPEUTIC ACTIVITIES Large, functional, dynamic, global movements used to build strength, balance, endurance, and flexibility and to improve physical performance. Step-ups - FWD 6\" x 2 min     Step-ups - LAT 6\" x 2 min     Step-ups - BWD                               NMR Feedback and cues necessary for developing neuromuscular control. Movement education and guided movement interventions  used to improve performance and control. To improve balance for safe community and home ambulation    Resisted walk      FWD      BKWD      lat      March 2 x 20'     Side stepping 4 x 20'     Retro walk 2 x 20'     Heel to toe 4 x 20 '     Fwd with cones      A:  Tolerated well. Above added to written HEP.   P: Continue with rehab plan  Matthew Jimenez, PT    Treatment Charges: Mins Units   Initial Evaluation       Re-Evaluation     Ther Exercise         TE 50 3   Manual Therapy     MT     Ther Activities        TA     Gait Training          GT     Neuro Re-education NR     Modalities     Non-Billable Service Time     Other     Total Time/Units  50  3

## 2021-07-02 ENCOUNTER — HOSPITAL ENCOUNTER (OUTPATIENT)
Dept: PHYSICAL THERAPY | Age: 71
Setting detail: THERAPIES SERIES
Discharge: HOME | End: 2021-07-02
Payer: MEDICARE

## 2021-07-02 PROCEDURE — 97530 THERAPEUTIC ACTIVITIES: CPT

## 2021-07-02 PROCEDURE — 97112 NEUROMUSCULAR REEDUCATION: CPT

## 2021-07-02 PROCEDURE — 97110 THERAPEUTIC EXERCISES: CPT

## 2021-07-06 ENCOUNTER — HOSPITAL ENCOUNTER (OUTPATIENT)
Dept: PHYSICAL THERAPY | Age: 71
Setting detail: THERAPIES SERIES
Discharge: HOME | End: 2021-07-06
Payer: MEDICARE

## 2021-07-06 PROCEDURE — 97530 THERAPEUTIC ACTIVITIES: CPT

## 2021-07-06 PROCEDURE — 97112 NEUROMUSCULAR REEDUCATION: CPT

## 2021-07-06 PROCEDURE — 97110 THERAPEUTIC EXERCISES: CPT

## 2021-07-06 NOTE — PROGRESS NOTES
[facility]  Physical Therapy Daily Treatment Note  Date: 2021  Patient Name: Glendy Bynum  : 1950   MRN: [id number]  Referring Provider: ALISON Mancera - CNP  [address] [place] [address],   [facility]     Medical Diagnosis: Parkinson's    Outcome Measure: RENE= 52    S: Pt reports compliance with HEP. He feels some improvement in balance. O:   Time 09:29-    Visit     Pain 0/10    ROM      Modalities     Exercise     Nustep L6 x 8 min    LAQ     Hamstring Curl alt 2# x 2 min    squats     calf raises 2# x 1 min    Hip abd alt 2# x 2 min 1 hand hold   Hip ext 2# x 2 min    March with AW 2# x 2 min 1 hand hold   THERAPEUTIC ACTIVITIES Large, functional, dynamic, global movements used to build strength, balance, endurance, and flexibility and to improve physical performance. Step-ups - FWD Alt. 6\" x 2 min    Step-ups - LAT 6\" x 2 min, up & over    Step-ups - BWD     Alt lunges 6\" x 2 min     NMR Feedback and cues necessary for developing neuromuscular control. Movement education and guided movement interventions  used to improve performance and control. To improve balance for safe community and home ambulation   Resisted walk      FWD      BKWD      lat     March 4 x 20'    Side stepping     Retro walk heel toe 4 x 20'    Heel to toe 4 x 20 '    Fwd with cones 4 x 20'    Ball push with cane 4 x 20'    A:  Tolerated well. No LOB. P: Continue with rehab plan.   Sherrie Levy PTA    Treatment Charges: Mins Units   Initial Evaluation       Re-Evaluation     Ther Exercise         TE 31 1   Manual Therapy     MT     Ther Activities        TA 8 1   Gait Training          GT     Neuro Re-education NR 10 1   Modalities     Non-Billable Service Time     Other     Total Time/Units  49 3

## 2021-07-09 ENCOUNTER — HOSPITAL ENCOUNTER (OUTPATIENT)
Dept: PHYSICAL THERAPY | Age: 71
Setting detail: THERAPIES SERIES
Discharge: HOME | End: 2021-07-09
Payer: MEDICARE

## 2021-07-09 PROCEDURE — 97530 THERAPEUTIC ACTIVITIES: CPT | Performed by: PHYSICAL THERAPIST

## 2021-07-09 PROCEDURE — 97110 THERAPEUTIC EXERCISES: CPT | Performed by: PHYSICAL THERAPIST

## 2021-07-09 PROCEDURE — 97112 NEUROMUSCULAR REEDUCATION: CPT | Performed by: PHYSICAL THERAPIST

## 2021-07-09 NOTE — PROGRESS NOTES
[facility]  Physical Therapy Daily Treatment Note  Date: 2021  Patient Name: Vitaliy Murphy  : 1950   MRN: [id number]  Referring Provider: ALISON [place]ris - CNP  [address] [place] [address]  [place],   [facility]     Medical Diagnosis: Parkinson's    Outcome Measure: RENE= 52    S: Pt reports compliance with HEP. He feels some improvement in balance. O:   Time 09:29-10:30    Visit     Pain 0/10    ROM      Modalities     Exercise     Nustep L6 x 8 min Seat 11, arms 11   LAQ     Hamstring Curl alt 2# x 2 min    squats     calf raises 2# x 1 min    Hip abd alt 2# x 2 min 1 hand hold   Hip ext 2# x 2 min    March with AW 2# x 2 min 1 hand hold   THERAPEUTIC ACTIVITIES Large, functional, dynamic, global movements used to build strength, balance, endurance, and flexibility and to improve physical performance. Step-ups - FWD Alt. 6\" x 2 min    Step-ups - LAT 6\" x 2 min, up & over    Step-ups - BWD     Alt lunges 6\" x 2 min     NMR Feedback and cues necessary for developing neuromuscular control. Movement education and guided movement interventions  used to improve performance and control. To improve balance for safe community and home ambulation            March 4 x 20'    Side stepping 4 x 20'    Retro walk heel toe 4 x 20'    Heel to toe 4 x 20 '    Fwd with cones 4 x 20'    Ball push with cane 4 x 20'    A:  Tolerated well. No LOB. P: Continue with rehab plan.   Sherly Ochoa PT    Treatment Charges: Mins Units   Initial Evaluation       Re-Evaluation     Ther Exercise         TE 38 2   Manual Therapy     MT     Ther Activities        TA 8 1   Gait Training          GT     Neuro Re-education NR 14 1   Modalities     Non-Billable Service Time     Other     Total Time/Units  60 4

## 2021-07-13 ENCOUNTER — HOSPITAL ENCOUNTER (OUTPATIENT)
Dept: PHYSICAL THERAPY | Age: 71
Setting detail: THERAPIES SERIES
Discharge: HOME | End: 2021-07-13
Payer: MEDICARE

## 2021-07-13 PROCEDURE — 97112 NEUROMUSCULAR REEDUCATION: CPT | Performed by: PHYSICAL THERAPIST

## 2021-07-13 PROCEDURE — 97530 THERAPEUTIC ACTIVITIES: CPT | Performed by: PHYSICAL THERAPIST

## 2021-07-13 PROCEDURE — 97110 THERAPEUTIC EXERCISES: CPT | Performed by: PHYSICAL THERAPIST

## 2021-07-13 NOTE — PROGRESS NOTES
[facility]  Physical Therapy Daily Treatment Note  Date: 2021  Patient Name: Zee Goodwin  : 1950   MRN: [id number]  Referring Provider: ALISON Rayo - CNP  [address] [place] [address],   [facility]     Medical Diagnosis: Parkinson's    Outcome Measure: RENE= 52    S: Pt reports compliance with HEP. He feels some improvement in balance. O:   Time 09:27- 10:33    Visit     Pain 0/10    ROM      Modalities     Exercise     Nustep L6 x 8 min Seat 11, arms 11   LAQ     Hamstring Curl alt 2# x 2 min    squats     calf raises 2# x 1 min    Hip abd alt 2# x 2 min 1 hand hold   Hip ext 2# x 2 min    March with AW 2# x 2 min 1 hand hold   THERAPEUTIC ACTIVITIES Large, functional, dynamic, global movements used to build strength, balance, endurance, and flexibility and to improve physical performance. Step-ups - FWD Alt. 6\" x 2 min    Step-ups - LAT 6\" x 2 min, up & over    Step-ups - BWD     Alt lunges 6\" x 2 min     NMR Feedback and cues necessary for developing neuromuscular control. Movement education and guided movement interventions  used to improve performance and control. To improve balance for safe community and home ambulation            March 4 x 20'    Side stepping 4 x 20'    Retro walk heel toe 4 x 20'    Heel to toe 4 x 20 '    Fwd with cones 4 x 20'    Ball push with cane 4 x 20'    A:  Tolerated well. No LOB. P: Continue with rehab plan.   Matthews Cheadle, PT    Treatment Charges: Mins Units   Initial Evaluation       Re-Evaluation     Ther Exercise         TE 38 2   Manual Therapy     MT     Ther Activities        TA 8 1   Gait Training          GT     Neuro Re-education NR 14 1   Modalities     Non-Billable Service Time     Other     Total Time/Units  60 4

## 2021-07-16 ENCOUNTER — HOSPITAL ENCOUNTER (OUTPATIENT)
Dept: PHYSICAL THERAPY | Age: 71
Setting detail: THERAPIES SERIES
Discharge: HOME | End: 2021-07-16
Payer: MEDICARE

## 2021-07-16 PROCEDURE — 97110 THERAPEUTIC EXERCISES: CPT | Performed by: PHYSICAL THERAPIST

## 2021-07-16 PROCEDURE — 97530 THERAPEUTIC ACTIVITIES: CPT | Performed by: PHYSICAL THERAPIST

## 2021-07-16 PROCEDURE — 97112 NEUROMUSCULAR REEDUCATION: CPT | Performed by: PHYSICAL THERAPIST

## 2021-07-16 NOTE — PROGRESS NOTES
[facility]  Physical Therapy Daily Treatment Note  Date: 2021  Patient Name: Jeff Espinal  : 1950   MRN: [id number]  Referring Provider: ALISON Ambrosio - CNP  [address] [place] [address]  Vickie,   [facility]     Medical Diagnosis: Parkinson's    Outcome Measure: RENE= 52    S: Pt reports compliance with HEP. He feels some improvement in balance. O:   Time 09:25-  10:30    Visit     Pain 0/10    ROM      Modalities     Exercise     Nustep L6 x 8 min Seat 11, arms 11   LAQ     Hamstring Curl alt 2# x 2 min    squats     calf raises 2# x 1 min    Hip abd alt 2# x 2 min 1 hand hold   Hip ext 2# x 2 min    March with AW 2# x 2 min 1 hand hold   THERAPEUTIC ACTIVITIES Large, functional, dynamic, global movements used to build strength, balance, endurance, and flexibility and to improve physical performance. Step-ups - FWD Alt. 6\" x 2 min    Step-ups - LAT 6\" x 2 min, up & over    Step-ups - BWD     Alt lunges 6\" x 2 min     NMR Feedback and cues necessary for developing neuromuscular control. Movement education and guided movement interventions  used to improve performance and control. To improve balance for safe community and home ambulation            March 4 x 20'    Side stepping 4 x 20'    Retro walk heel toe 4 x 20'    Heel to toe 4 x 20 '    Fwd with cones 4 x 20'    Ball push with cane 4 x 20'    A:  Tolerated well. No LOB. P: Continue with rehab plan.   April Irving, PT    Treatment Charges: Mins Units   Initial Evaluation       Re-Evaluation     Ther Exercise         TE 38 2   Manual Therapy     MT     Ther Activities        TA 8 1   Gait Training          GT     Neuro Re-education NR 14 1   Modalities     Non-Billable Service Time     Other     Total Time/Units  60 4

## 2021-07-20 ENCOUNTER — HOSPITAL ENCOUNTER (OUTPATIENT)
Dept: PHYSICAL THERAPY | Age: 71
Setting detail: THERAPIES SERIES
Discharge: HOME | End: 2021-07-20
Payer: MEDICARE

## 2021-07-20 PROCEDURE — 97530 THERAPEUTIC ACTIVITIES: CPT | Performed by: PHYSICAL THERAPIST

## 2021-07-20 PROCEDURE — 97112 NEUROMUSCULAR REEDUCATION: CPT | Performed by: PHYSICAL THERAPIST

## 2021-07-20 PROCEDURE — 97110 THERAPEUTIC EXERCISES: CPT | Performed by: PHYSICAL THERAPIST

## 2021-07-20 NOTE — PROGRESS NOTES
[facility]  Physical Therapy Daily Treatment Note  Date: 2021  Patient Name: Bailee Sequeira  : 1950   MRN: [id number]  Referring Provider: ALISON Ariza - CNP  [address] [place] [address],  [facility]     Medical Diagnosis: Parkinson's    Outcome Measure: RENE= 52    S: Pt reports compliance with HEP. He feels some improvement in balance. O: Added foam to march today. Time 09:20-10:15    Visit 10/12    Pain 0/10    ROM      Modalities     Exercise     Nustep L6 x 8 min Seat 11, arms 11   LAQ     Hamstring Curl alt 2# x 2 min    squats     calf raises 2# x 1 min    Hip abd alt 2# x 2 min 1 hand hold   Hip ext 2# x 2 min    March with AW 2# x 2 min on foam 1 hand hold   THERAPEUTIC ACTIVITIES Large, functional, dynamic, global movements used to build strength, balance, endurance, and flexibility and to improve physical performance. Step-ups - FWD Alt. 6\" x 2 min    Step-ups - LAT 6\" x 2 min, up & over    Step-ups - BWD     Alt lunges 6\" x 2 min     NMR Feedback and cues necessary for developing neuromuscular control. Movement education and guided movement interventions  used to improve performance and control. To improve balance for safe community and home ambulation            March 4 x 20'    Side stepping 4 x 20'    Retro walk heel toe 4 x 20'    Heel to toe 4 x 20 '    Fwd with cones 4 x 20'    Ball push with cane 4 x 20'    A:  Tolerated well. No LOB. P: Continue with rehab plan.   Eric Mayfield, PT    Treatment Charges: Mins Units   Initial Evaluation       Re-Evaluation     Ther Exercise         TE 33 2   Manual Therapy     MT     Ther Activities        TA 8 1   Gait Training          GT     Neuro Re-education NR 14 1   Modalities     Non-Billable Service Time     Other     Total Time/Units  55 4

## 2021-07-21 ENCOUNTER — OFFICE VISIT (OUTPATIENT)
Dept: NEUROLOGY | Age: 71
End: 2021-07-21
Payer: MEDICARE

## 2021-07-21 VITALS
SYSTOLIC BLOOD PRESSURE: 123 MMHG | DIASTOLIC BLOOD PRESSURE: 73 MMHG | HEIGHT: 70 IN | TEMPERATURE: 97.3 F | WEIGHT: 165 LBS | OXYGEN SATURATION: 96 % | BODY MASS INDEX: 23.62 KG/M2 | HEART RATE: 50 BPM

## 2021-07-21 DIAGNOSIS — F10.20 ALCOHOLISM (HCC): ICD-10-CM

## 2021-07-21 DIAGNOSIS — G20 PARKINSON'S DISEASE (HCC): Primary | ICD-10-CM

## 2021-07-21 PROCEDURE — 99214 OFFICE O/P EST MOD 30 MIN: CPT | Performed by: NURSE PRACTITIONER

## 2021-07-21 NOTE — PROGRESS NOTES
[facility]. Frieda Bernheim., M.D., F.A.C.P. Chantel Nieto, JUNIOR, APRN, CNS  [facility]. Seth Jorgensen, MSN, APRN-FNP-C  Korin Mccarthy MSN, APRN, FNP-C  Jeanie ROWLANDAS, PATianC  Brandyn Ocampo MSN, APRN, FNP-C  [address], [facility]  [place], [address]  Phone: [phone]  Fax: [phone]         Christel Doshi is a 79 y.o. right handed male     We are following him for Parkinson's    He presents with his wife and both are good historians. He had a positive response to increasing Sinemet  mg to 3 times daily. He feels that his tremors and overall movements improve for about 1-1/2 to 2 hours after taking this dosage. Still noting some gait freezing with turns, but this has been improving with physical therapy. No lightheadedness or syncope. No falls or dysphagia. No changes in his memory or mood. Sleeping fairly well    He continues to drink 1 large can of beer daily and is having difficulty completely ceasing alcohol usage. He did not pursue the referral for professional help. He is trying to exercise on his own. Medications:     Current Outpatient Medications on File Prior to Visit   Medication Sig Dispense Refill    warfarin (COUMADIN) 5 MG tablet take 1 tablet by mouth once daily      Verapamil HCl  MG CP24 Take by mouth      carbidopa-levodopa (SINEMET)  MG per tablet Take 2 tablets by mouth 3 times daily 540 tablet 3     No current facility-administered medications on file prior to visit.      Objective:     /73 (Site: Right Upper Arm, Position: Sitting, Cuff Size: Medium Adult)   Pulse 50   Temp 97.3 °F (36.3 °C) (Infrared)   Ht 5' 10\" (1.778 m)   Wt 165 lb (74.8 kg)   SpO2 96%   BMI 23.68 kg/m²     General appearance: alert, appears stated age, cooperative and in no distress--hypomimia  Head: normocephalic, without obvious abnormality, atraumatic  Eyes: conjunctivae/corneas clear; no drainage  Neck:  no carotid bruit, mildly limited ROM with no cogwheeling  Lungs: clear to auscultation bilaterally  Heart: irreg irregular rate and rhythm  Extremities: normal, atraumatic, no cyanosis or edema  Pulses: 2+ and symmetric  Skin:  color, texture, turgor normal--no rashes or lesions      Mental Status: alert and oriented x 4--pleasant      Appropriate attention/concentration  Intact fundus of knowledge  Memories intact    Speech: no dysarthria; hypophonic  Language: no aphasias    Cranial Nerves:  I: smell    II: visual acuity     II: visual fields Full    II: pupils LISA   III,VII: ptosis None   III,IV,VI: extraocular muscles  EOMI without nystagmus   V: mastication Normal   V: facial light touch sensation  Normal   V,VII: corneal reflex     VII: facial muscle function - upper  Normal   VII: facial muscle function - lower Normal   VIII: hearing Normal   IX: soft palate elevation  Normal   IX,X: gag reflex    XI: trapezius strength  5/5   XI: sternocleidomastoid strength 5/5   XI: neck extension strength  5/5   XII: tongue strength  Normal     No Myerson's    Motor:  5/5 throughout  Decreased bulk  No cogwheeling  Improved pill rolling tremor of R hand  Mild-mod bradykinetic    Sensory:  LT normal    Coordination:   Hyperkinesis with FFM and TRYESE R hand; no ataxia  HS normal    Gait:  Improved--R hand tremor while walking  No shuffling or festination  No gait freezing today    DTR:   Right Brachioradialis reflex 1+  Left Brachioradialis reflex 1+  Right Biceps reflex 1+  Left Biceps reflex 1+  Right Triceps reflex 1+  Left Triceps reflex 1+  Right Quadriceps reflex 2+  Left Quadriceps reflex 2+  Right Achilles reflex 1+  Left Achilles reflex 1+    No Saavedra's    No other pathological reflexes    Laboratory/Radiology:  ry/Radiology:      Ref. Range 6/7/2019 08:54   Prothrombin Time Latest Ref Range: 9.3 - 12.4 sec 26.7 (H)   INR Unknown 2.3     MRI brain WO: No acute intracranial abnormality. 2. No mass effect, edema or hemorrhage.  3. Bilateral mastoid effusions, which may be due to eustachian tube dysfunction or otomastoiditis. Clinical correlation is needed. Assessment:     Parkinson's disease: positive motor response to increasing Sinemet but having wearing off in between doses, therefore will increase frequency. Can consider XR dosing as well.  Exam has improved and he has no major non-motor symptoms at present time    Alcoholism    A. fib: On [address]    Depression    Plan:     Increase Sinemet to 50/200 mg every 4 hours WA    Continue PT and diligent independent exercise    Pt declining referral for alcohol abuse counseling    RTO in 4 mos or or sooner PRN    ALISON Canas CNP  11:04 AM  7/21/2021

## 2021-07-21 NOTE — PATIENT INSTRUCTIONS
Patient Education        Parkinson's Disease: Care Instructions  Overview  When you have Parkinson's disease, part of your brain cannot make enough dopamine, a chemical that helps control movement. The disease can cause tremors, stiffness, and problems with movement. Severe or advanced cases can also cause problems with thinking. Taking your medicines correctly and getting regular exercise may help you maintain your quality of life. There are many things that can cause Parkinson's disease symptoms, including some medicine, some toxins, and trauma to the head. The cause in most cases is not known. Follow-up care is a key part of your treatment and safety. Be sure to make and go to all appointments, and call your doctor if you are having problems. It's also a good idea to know your test results and keep a list of the medicines you take. How can you care for yourself at home? General care  · Take your medicines exactly as prescribed. Call your doctor if you think you are having a problem with your medicine. · Make sure your home is safe:  ? Place furniture so that you have something to hold on to as you walk around the house. ? Use chairs that make it easier to sit down and stand up. ? Group the things you use most, such as reading glasses, keys, and the telephone, in one easy-to-reach place. ? Tack down rugs so that you do not trip. ? Put no-slip tape and handrails in the tub to prevent falls. · Use a cane, walker, or scooter if your doctor suggests it. · Keep up your normal activities as much as you can. · Find ways to manage stress, which can make symptoms worse. · Spend time with family and friends. Join a support group for people with Parkinson's disease if you want extra help. · Depression is common with this condition. Tell your doctor if you have trouble sleeping, are eating too much or are not hungry, or feel sad or tearful all the time.  Depression can be treated with medicine and counseling. Diet and exercise  · Eat a balanced diet. · If you are taking levodopa, do not eat protein at the same time you take your medicine. Levodopa may not work as well if you take it at the same time you eat protein. You can eat normal amounts of protein. Talk to your doctor if you have questions. · If you have problems swallowing, change how and what you eat:  ? Try thick drinks, such as milk shakes. They are easier to swallow than other fluids. ? Do not eat foods that crumble easily. These can cause choking. ? Use a  to prepare food. Soft foods need less chewing. ? Eat small meals often so that you do not get tired from eating heavy meals. · Drink plenty of water and eat a high-fiber diet to prevent constipation. Parkinson's--and the medicines that treat it--may slow your intestines. · Get exercise on most days. Work with your doctor to set up a program of walking, swimming, or other exercise you are able to do. When should you call for help? Call your doctor now or seek immediate medical care if:    · You have a change in your symptoms.     · You develop other problems from your condition, such as:  ? Injury from a fall. ? Thinking or memory problems. ? A urinary tract infection (burning pain when urinating). Watch closely for changes in your health, and be sure to contact your doctor if:    · You lose weight because of problems with eating.     · You want more information about your condition or your medicines. Where can you learn more? Go to https://[employer]josé miguel.[facility]. org and sign in to your [employer] account. Enter W954 in the [facility] box to learn more about \"Parkinson's Disease: Care Instructions. \"     If you do not have an account, please click on the \"Sign Up Now\" link. Current as of: August 4, 2020               Content Version: 12.9  © 2401-2576 Healthwise, [employer]. Care instructions adapted under license by [facility] ([facility]).  If you have questions about a medical condition or this instruction, always ask your healthcare professional. [address] any warranty or liability for your use of this information. Patient Education         Alcohol: Time for a Change? (02:57)  Your health professional recommends that you watch this short online health video. Hear how others found the motivation to change their relationship to alcohol. Purpose:  Uses stories to motivate patients to see the benefits of changing their relationship to alcohol. Goal:  Users will feel motivated to change after considering the pros and cons of alcohol use. How to watch the video    Scan the QR code   OR Visit the website    https://Sleep.FMi. se/r/Gh5zknprhplsx   Current as of: February 11, 2021               Content Version: 12.9  © 2006-2021 Healthwise, Incorporated. Care instructions adapted under license by [facility] ([facility]). If you have questions about a medical condition or this instruction, always ask your healthcare professional. [address] any warranty or liability for your use of this information.

## 2021-07-23 ENCOUNTER — HOSPITAL ENCOUNTER (OUTPATIENT)
Dept: PHYSICAL THERAPY | Age: 71
Setting detail: THERAPIES SERIES
Discharge: HOME | End: 2021-07-23
Payer: MEDICARE

## 2021-07-23 PROCEDURE — 97110 THERAPEUTIC EXERCISES: CPT | Performed by: PHYSICAL THERAPIST

## 2021-07-23 PROCEDURE — 97530 THERAPEUTIC ACTIVITIES: CPT | Performed by: PHYSICAL THERAPIST

## 2021-07-23 PROCEDURE — 97112 NEUROMUSCULAR REEDUCATION: CPT | Performed by: PHYSICAL THERAPIST

## 2021-07-23 NOTE — PROGRESS NOTES
[facility]  Physical Therapy Daily Treatment Note  Date: 2021  Patient Name: Jackie Chapman  : 1950   MRN: [id number]  Referring Provider: ALISON Gardner - CNP  [address] [place] [address]  Vickie,   [facility]     Medical Diagnosis: Parkinson's    Outcome Measure: RENE= 52    S: Pt reports compliance with HEP. He feels some improvement in balance. O: Added foam to march today. Time 09:22-10:21    Visit 11/12    Pain 0/10    ROM      Modalities     Exercise     Nustep L6 x 8 min Seat 11, arms 11   LAQ     Hamstring Curl alt 2# x 2 min    squats     calf raises 2# x 1 min    Hip abd alt 2# x 2 min 1 hand hold   Hip ext 2# x 2 min    March with AW 2# x 2 min on foam 1 hand hold   THERAPEUTIC ACTIVITIES Large, functional, dynamic, global movements used to build strength, balance, endurance, and flexibility and to improve physical performance. Step-ups - FWD Alt. 6\" x 2 min    Step-ups - LAT 6\" x 2 min, up & over    Step-ups - BWD     Alt lunges 6\" x 2 min     NMR Feedback and cues necessary for developing neuromuscular control. Movement education and guided movement interventions  used to improve performance and control. To improve balance for safe community and home ambulation            March 4 x 20'    Side stepping 4 x 20'    Retro walk heel toe 4 x 20'    Heel to toe 4 x 20 '    Fwd with cones 4 x 20'    Ball push with cane 4 x 20'    A:  Tolerated well. No LOB. P: Continue with rehab plan.   Oswaldo York, PT    Treatment Charges: Mins Units   Initial Evaluation       Re-Evaluation     Ther Exercise         TE 37 2   Manual Therapy     MT     Ther Activities        TA 8 1   Gait Training          GT     Neuro Re-education NR 14 1   Modalities     Non-Billable Service Time     Other     Total Time/Units  59 4

## 2021-07-23 NOTE — PROGRESS NOTES
PT PROGRESS REPORT      PATIENT: Gary Dunbar   Date: 7/23/2021  DIAGNOSIS:  Parkinson's  PHYSICIAN:  Genet Bell, APRN - CNP  [address],  [address] [place] [address]     ATTENDANCE:  11  OUT OF 11 APPOINTMENT FROM  6/11/2021 TO 07/23/21  TREATMENTS RECEIVED:  THEREX, GAIT, BALANCE, HEP     INITIAL PROBLEM CURRENT STATUS        RENE BALANCE TEST= 45 50                  DECREASED STRENGTH RLE      HIP              Flex= 4-/5              Abd= 4-/5              Add= 4-/5  Knee    flex = 4/5    / =  4/5  Ankle    pf=      4/5    df=     4/5              HIP              Flex= 4/5              Abd= 4/5              Add= 4/5  Knee    flex = 4+/5    / =  4+/5  Ankle    pf=      4+/5    df=     4/5       DECREASED STRENGTH RLE      HIP              Flex= 4-/5              Abd= 4-/5              Add= 4-/5  Knee    flex = 4/5    / =  4/5  Ankle    pf=      4/5    df=     4/5        HIP              Flex= 4/5              Abd= 4/5              Add= 4/5  Knee    flex = 4+/5    / =  4+/5  Ankle    pf=      4+/5    df=     4/5     COMMENTS/ RECOMMENDATIONS:    POC: Continue PT 2x/wk for 4 additiional weeks: Therex,  Balance training, PRE, Gait, HEP. GOALS:    1. Increase strength BLE to 5/5.                    2. Improve dynamic balance to allow safe community mobility.                      I have reviewed the Plan of Care established for skilled therapy services and certify that the services are required and that they will be provided while the patient is under my care.      ___________________         ___________  Physician signature                    date      [address], PLEASE FEEL FREE TO CONTACT ME AT   [address] [place] [address] [address] : [phone]  PHONE: [phone]

## 2021-07-27 ENCOUNTER — HOSPITAL ENCOUNTER (OUTPATIENT)
Dept: PHYSICAL THERAPY | Age: 71
Setting detail: THERAPIES SERIES
Discharge: HOME | End: 2021-07-27
Payer: MEDICARE

## 2021-07-27 PROCEDURE — 97112 NEUROMUSCULAR REEDUCATION: CPT | Performed by: PHYSICAL THERAPIST

## 2021-07-27 PROCEDURE — 97530 THERAPEUTIC ACTIVITIES: CPT | Performed by: PHYSICAL THERAPIST

## 2021-07-27 PROCEDURE — 97110 THERAPEUTIC EXERCISES: CPT | Performed by: PHYSICAL THERAPIST

## 2021-07-27 NOTE — PROGRESS NOTES
[facility]  Physical Therapy Daily Treatment Note  Date: 2021  Patient Name: Vitaliy Murphy  : 1950   MRN: [id number]  Referring Provider: ALISON Burris - CNP  [address] [place] [address]  Vickie,   [facility]     Medical Diagnosis: Parkinson's    Outcome Measure: RENE= 52    S: Pt reports compliance with HEP. He feels some improvement in balance. O: Added foam to march today. Time 09:23-10:22    Visit     Pain 0/10    ROM      Modalities     Exercise     Nustep L6 x 8 min Seat 11, arms 11   LAQ     Hamstring Curl alt 2# x 2 min    squats     calf raises 2# x 1 min    Hip abd alt 2# x 2 min 1 hand hold   Hip ext 2# x 2 min    March with AW 2# x 2 min on foam 1 hand hold   THERAPEUTIC ACTIVITIES Large, functional, dynamic, global movements used to build strength, balance, endurance, and flexibility and to improve physical performance. Step-ups - FWD Alt. 6\" x 2 min    Step-ups - LAT 6\" x 2 min, up & over    Step-ups - BWD     Alt lunges 6\" x 2 min     NMR Feedback and cues necessary for developing neuromuscular control. Movement education and guided movement interventions  used to improve performance and control. To improve balance for safe community and home ambulation            March 4 x 20'    Side stepping 4 x 20'    Retro walk heel toe 4 x 20'    Heel to toe 4 x 20 '    Fwd with cones 4 x 20'    Ball push with cane 4 x 20'    A:  Tolerated well. No LOB. P: Continue with rehab plan.   Sherly Ochoa PT    Treatment Charges: Mins Units   Initial Evaluation       Re-Evaluation     Ther Exercise         TE 37 2   Manual Therapy     MT     Ther Activities        TA 8 1   Gait Training          GT     Neuro Re-education NR 14 1   Modalities     Non-Billable Service Time     Other     Total Time/Units  59 4

## 2021-07-30 ENCOUNTER — HOSPITAL ENCOUNTER (OUTPATIENT)
Dept: PHYSICAL THERAPY | Age: 71
Setting detail: THERAPIES SERIES
Discharge: HOME | End: 2021-07-30
Payer: MEDICARE

## 2021-07-30 ENCOUNTER — TELEPHONE (OUTPATIENT)
Dept: NEUROLOGY | Age: 71
End: 2021-07-30

## 2021-07-30 DIAGNOSIS — G20 PARKINSON'S DISEASE (HCC): Primary | ICD-10-CM

## 2021-07-30 PROCEDURE — 97112 NEUROMUSCULAR REEDUCATION: CPT | Performed by: PHYSICAL THERAPIST

## 2021-07-30 PROCEDURE — 97530 THERAPEUTIC ACTIVITIES: CPT | Performed by: PHYSICAL THERAPIST

## 2021-07-30 PROCEDURE — 97110 THERAPEUTIC EXERCISES: CPT | Performed by: PHYSICAL THERAPIST

## 2021-07-30 NOTE — TELEPHONE ENCOUNTER
I received a call from Kashif at [facility] PT. She stated that it is recommended for Enedina Cuevas to continue PT. They sent over orders to be signed but if it is faster you can send a new order. Please advise.     Electronically signed by Mary Alice Boles MA on 7/30/2021 at 9:41 AM

## 2021-08-03 ENCOUNTER — HOSPITAL ENCOUNTER (OUTPATIENT)
Dept: PHYSICAL THERAPY | Age: 71
Setting detail: THERAPIES SERIES
Discharge: HOME | End: 2021-08-03
Payer: MEDICARE

## 2021-08-03 PROCEDURE — 97530 THERAPEUTIC ACTIVITIES: CPT

## 2021-08-03 PROCEDURE — 97110 THERAPEUTIC EXERCISES: CPT

## 2021-08-03 PROCEDURE — 97112 NEUROMUSCULAR REEDUCATION: CPT

## 2021-08-03 NOTE — PROGRESS NOTES
[facility]  Physical Therapy Daily Treatment Note  Date: 8/3/2021  Patient Name: Paulette Herrera  : 1950   MRN: [id number]  Referring Provider: Austyn Patel, ALISON - CNP  [address] [place] [address]  Vickie,   [facility]     Medical Diagnosis: Parkinson's    Outcome Measure: RENE= 52    S: Pt reports compliance with HEP. He feels some improvement in balance. O:  Encouraging hip flexion before forward advancement of LE's seems to help pt avoid freezing. Time 4507-5419 2x/week x 6 weeks (12)  + 2x/wk for 4 weeks (8)   Visit     Pain 0/10    ROM      Exercise     Nustep L6 x 8 min Seat 11, arms 11   Hamstring Curl alt 2# x 2 min    calf raises 2# x 2 min    Hip abd alt 2# x 2 min    Hip ext 2# x 2 min    March with AW 2# x 2 min on foam    THERAPEUTIC ACTIVITIES Large, functional, dynamic, global movements used to build strength, balance, endurance, and flexibility and to improve physical performance. Step-ups - FWD Alt. 6\" x 2 min    Step-ups - LAT 6\" x 2 min, up & over    Step-ups - BWD     Alt lunges 6\" x 2 min     NMR Feedback and cues necessary for developing neuromuscular control. Movement education and guided movement interventions  used to improve performance and control. To improve balance for safe community and home ambulation   March 4 x 20'    Side stepping 4 x 20'    Retro walk heel toe 4 x 20'    Heel to toe 4 x 20 '    Fwd with cones 6 x 20'    Ball push with cane 6 x 20'    A:  Tolerated well. No LOB. P: Continue with rehab plan.   Terrence Emery PTA    Treatment Charges: Mins Units   Initial Evaluation       Re-Evaluation     Ther Exercise         TE 18 1   Manual Therapy     MT     Ther Activities        TA 8 1   Gait Training          GT     Neuro Re-education NR 14 1   Modalities     Non-Billable Service Time     Other     Total Time/Units 40 3

## 2021-08-06 ENCOUNTER — HOSPITAL ENCOUNTER (OUTPATIENT)
Dept: PHYSICAL THERAPY | Age: 71
Setting detail: THERAPIES SERIES
Discharge: HOME | End: 2021-08-06
Payer: MEDICARE

## 2021-08-06 PROCEDURE — 97530 THERAPEUTIC ACTIVITIES: CPT

## 2021-08-06 PROCEDURE — 97110 THERAPEUTIC EXERCISES: CPT

## 2021-08-06 PROCEDURE — 97112 NEUROMUSCULAR REEDUCATION: CPT

## 2021-08-06 NOTE — PROGRESS NOTES
[facility]  Physical Therapy Daily Treatment Note  Date: 2021  Patient Name: Zahida Singer  : 1950   MRN: [id number]  Referring Provider: ALISON Bethea - CNP  [address] [place] [address]  Vickie,   [facility] Diagnosis: Parkinson's    Outcome Measure: RENE= 52    S: Pt reports compliance with HEP. He feels some improvement in balance. O:  Encouraging hip flexion before forward advancement of LE's seems to help pt avoid freezing. Time 6589-4461 2x/week x 6 weeks (12)  + 2x/wk for 4 weeks (8)   Visit     Pain 0/10    ROM      Exercise     Nustep L6 x 8 min Seat 11, arms 11   Hamstring Curl alt 2# x 2 min    calf raises 2# x 2 min    Hip abd alt 2# x 2 min    Hip ext 2# x 2 min    March with AW 2# x 2 min on foam    THERAPEUTIC ACTIVITIES Large, functional, dynamic, global movements used to build strength, balance, endurance, and flexibility and to improve physical performance. Step-ups - FWD Alt. 6\" x 2 min    Step-ups - LAT 6\" x 2 min, up & over    Step-ups - BWD     Alt lunges 6\" x 2 min     NMR Feedback and cues necessary for developing neuromuscular control. Movement education and guided movement interventions  used to improve performance and control. To improve balance for safe community and home ambulation   March     Side stepping     Retro walk heel toe     Heel to toe 4 x 20 '    Fwd with cones 6 x 20'    Ladder:  side 2 in / 2 out  fwd 2 feet in ladder / 2 feet outside ladder    x 4   x 4    Fwd command 360 turns L/R 20' x 4    Fwd commands fwd/bachward, side step 20' x 4    Ball push with cane     A:  Tolerated well. No LOB, but NMR performed with CG, verbal cues needed for turning to decrease freezing. .  P: Continue with rehab plan.   Charbel Isidro PTA    Treatment Charges: Mins Units   Initial Evaluation       Re-Evaluation     Ther Exercise         TE 22 1   Manual Therapy     MT     Ther Activities        TA 8 1   Gait Training GT     Neuro Re-education NR 20 1   Modalities     Non-Billable Service Time     Other     Total Time/Units 50 3

## 2021-08-10 ENCOUNTER — HOSPITAL ENCOUNTER (OUTPATIENT)
Dept: PHYSICAL THERAPY | Age: 71
Setting detail: THERAPIES SERIES
Discharge: HOME | End: 2021-08-10
Payer: MEDICARE

## 2021-08-10 PROCEDURE — 97110 THERAPEUTIC EXERCISES: CPT

## 2021-08-10 PROCEDURE — 97530 THERAPEUTIC ACTIVITIES: CPT

## 2021-08-10 PROCEDURE — 97112 NEUROMUSCULAR REEDUCATION: CPT

## 2021-08-10 NOTE — PROGRESS NOTES
[facility]  Physical Therapy Daily Treatment Note  Date: 8/10/2021  Patient Name: Eliel Matias  : 1950   MRN: [id number]  Referring Provider: ALISON Alonso CNP  [address] [place] [address],  [address] [place] [address]     Medical Diagnosis: Parkinson's    Outcome Measure: RENE= 52    S: Pt reports compliance with HEP. He feels some improvement in balance. O:  Encouraging hip flexion before forward advancement of LE's seems to help pt avoid freezing. Time  2x/week x 6 weeks (12)  + 2x/wk for 4 weeks (8)   Visit 15/20    Pain 0/10    ROM      Exercise     Nustep L6 x 8 min Seat 11, arms 11   Hamstring Curl alt 2# x 2 min    calf raises 2# x 2 min    Hip abd alt 2# x 2 min    Hip ext 2# x 2 min    March with AW 2# x 2 min on foam    THERAPEUTIC ACTIVITIES Large, functional, dynamic, global movements used to build strength, balance, endurance, and flexibility and to improve physical performance. Step-ups - FWD Alt. 6\" x 2 min    Step-ups - LAT 6\" x 2 min, up & over    Step-ups - BWD     Alt lunges 6\" x 2 min     NMR Feedback and cues necessary for developing neuromuscular control. Movement education and guided movement interventions  used to improve performance and control. To improve balance for safe community and home ambulation   March 4 x 20'    Side stepping     Retro walk heel toe 4 x 20'    Heel to toe 4 x 20 '    Fwd with cones 4 x 20' +1 CG    Fwd command 360 turns L/R, fwd/backward, side step 4 x 20' +1 CG    Ball push with cane     A:  Tolerated well. No LOB, some NMR performed with CG (where noted), verbal cues needed for turning to decrease freezing. P: Continue with rehab plan.   Gillian Shine, JAZZMINE    Treatment Charges: Mins Units   Initial Evaluation       Re-Evaluation     Ther Exercise         TE 22 1   Manual Therapy     MT     Ther Activities        TA 8 1   Gait Training          GT     Neuro Re-education NR 22 1   Modalities     Non-Billable Service Time     Other     Total Time/Units 52 3

## 2021-08-13 ENCOUNTER — HOSPITAL ENCOUNTER (OUTPATIENT)
Dept: PHYSICAL THERAPY | Age: 71
Setting detail: THERAPIES SERIES
Discharge: HOME | End: 2021-08-13
Payer: MEDICARE

## 2021-08-13 PROCEDURE — 97110 THERAPEUTIC EXERCISES: CPT | Performed by: PHYSICAL THERAPIST

## 2021-08-13 PROCEDURE — 97530 THERAPEUTIC ACTIVITIES: CPT | Performed by: PHYSICAL THERAPIST

## 2021-08-13 PROCEDURE — 97112 NEUROMUSCULAR REEDUCATION: CPT | Performed by: PHYSICAL THERAPIST

## 2021-08-17 ENCOUNTER — HOSPITAL ENCOUNTER (OUTPATIENT)
Dept: PHYSICAL THERAPY | Age: 71
Setting detail: THERAPIES SERIES
Discharge: HOME | End: 2021-08-17
Payer: MEDICARE

## 2021-08-17 NOTE — PROGRESS NOTES
[facility]  Physical Therapy Daily Treatment Note  Date: 2021  Patient Name: Sae Falcon  : 1950   MRN: [id number]  Referring Provider: ALISON Skinner CNP  [address] [place] [address],  [address] [facility]     Medical Diagnosis: Parkinson's    Outcome Measure: RENE= 52    S: Pt reports compliance with HEP. He feels some improvement in balance. O:  Encouraging hip flexion before forward advancement of LE's seems to help pt avoid freezing. Time 0925-10:33 2x/week x 6 weeks (12)  + 2x/wk for 4 weeks (8)   Visit     Pain 0/10    ROM      Exercise     Nustep L6 x 8 min Seat 11, arms 11   Hamstring Curl alt 2# x 2 min    calf raises 2# x 2 min    Hip abd alt 2# x 2 min    Hip ext 2# x 2 min    March with AW 2# x 2 min on foam    THERAPEUTIC ACTIVITIES Large, functional, dynamic, global movements used to build strength, balance, endurance, and flexibility and to improve physical performance. Step-ups - FWD Alt. 6\" x 2 min    Step-ups - LAT 6\" x 2 min, up & over    Step-ups - BWD     Alt lunges 6\" x 2 min     NMR Feedback and cues necessary for developing neuromuscular control. Movement education and guided movement interventions  used to improve performance and control. To improve balance for safe community and home ambulation   March 4 x 20'    Side stepping 4 x 20'    Retro walk heel toe 4 x 20'    Heel to toe 4 x 20 '    Fwd with cones 4 x 20' +1 CG    Ball push with cane 4x 20'    A:  Tolerated well. No LOB, some NMR performed with CG (where noted), verbal cues needed to increase hip flexion for turning to decrease freezing. P: Continue with rehab plan.   Jonathan Huynh, PT    Treatment Charges: Mins Units   Initial Evaluation       Re-Evaluation     Ther Exercise         TE 30 2   Manual Therapy     MT     Ther Activities        TA 8 1   Gait Training          GT     Neuro Re-education NR 22 1   Modalities     Non-Billable Service Time     Other     Total Time/Units 60 4

## 2021-08-20 ENCOUNTER — HOSPITAL ENCOUNTER (OUTPATIENT)
Dept: PHYSICAL THERAPY | Age: 71
Setting detail: THERAPIES SERIES
Discharge: HOME | End: 2021-08-20
Payer: MEDICARE

## 2021-08-20 PROCEDURE — 97112 NEUROMUSCULAR REEDUCATION: CPT

## 2021-08-20 PROCEDURE — 97530 THERAPEUTIC ACTIVITIES: CPT

## 2021-08-20 PROCEDURE — 97110 THERAPEUTIC EXERCISES: CPT

## 2021-08-25 ENCOUNTER — HOSPITAL ENCOUNTER (OUTPATIENT)
Dept: PHYSICAL THERAPY | Age: 71
Setting detail: THERAPIES SERIES
Discharge: HOME | End: 2021-08-25
Payer: MEDICARE

## 2021-08-25 PROCEDURE — 97110 THERAPEUTIC EXERCISES: CPT | Performed by: PHYSICAL THERAPIST

## 2021-08-25 PROCEDURE — 97530 THERAPEUTIC ACTIVITIES: CPT | Performed by: PHYSICAL THERAPIST

## 2021-08-25 PROCEDURE — 97112 NEUROMUSCULAR REEDUCATION: CPT | Performed by: PHYSICAL THERAPIST

## 2021-08-25 NOTE — PROGRESS NOTES
[facility]  Physical Therapy Daily Treatment Note  Date: 2021  Patient Name: Lc Cristina  : 1950   MRN: [id number]  Referring Provider: AILSON Miranda CNP  [address] [place] [address]  Vickie,   [facility] Diagnosis: Parkinson's    Outcome Measure: RENE= 52    S: Pt reports compliance with HEP. He feels some improvement in balance. O:  Encouraging hip flexion before forward advancement of LE's seems to help pt avoid freezing. Time 09:20-10:25 2x/week x 6 weeks (12)  + 2x/wk for 4 weeks (8)   Visit     Pain 0/10    ROM      Exercise     Nustep L6 x 8 min Seat 11, arms 11   Hamstring Curl alt 2# x 2 min    calf raises 2# x 2 min    Hip abd alt 2# x 2 min    Hip ext 2# x 2 min    March with AW 2# x 2 min on foam    THERAPEUTIC ACTIVITIES Large, functional, dynamic, global movements used to build strength, balance, endurance, and flexibility and to improve physical performance. Step-ups - FWD Alt. 6\" x 2 min    Step-ups - LAT 6\" x 2 min, up & over    Step-ups - BWD     Alt lunges 6\" x 2 min     NMR Feedback and cues necessary for developing neuromuscular control. Movement education and guided movement interventions  used to improve performance and control. To improve balance for safe community and home ambulation   March 4 x 20'    Side stepping 4 x 20'    Retro walk heel toe 4 x 20'    Heel to toe 4 x 20 '    Fwd with cones 4 x 20' +1 CG    Ball push with cane 4x 20'    A:  Tolerated well. No LOB, some NMR performed with CG (where noted), verbal cues needed to increase hip flexion for turning to decrease freezing. P: Continue with rehab plan.   Maribeth Console, PT    Treatment Charges: Mins Units   Initial Evaluation       Re-Evaluation     Ther Exercise         TE 30 2   Manual Therapy     MT     Ther Activities        TA 8 1   Gait Training          GT     Neuro Re-education NR 22 1   Modalities     Non-Billable Service Time     Other     Total Time/Units 60 4

## 2021-08-27 ENCOUNTER — HOSPITAL ENCOUNTER (OUTPATIENT)
Dept: PHYSICAL THERAPY | Age: 71
Setting detail: THERAPIES SERIES
Discharge: HOME | End: 2021-08-27
Payer: MEDICARE

## 2021-08-27 PROCEDURE — 97112 NEUROMUSCULAR REEDUCATION: CPT | Performed by: PHYSICAL THERAPIST

## 2021-08-27 PROCEDURE — 97110 THERAPEUTIC EXERCISES: CPT | Performed by: PHYSICAL THERAPIST

## 2021-08-27 PROCEDURE — 97530 THERAPEUTIC ACTIVITIES: CPT | Performed by: PHYSICAL THERAPIST

## 2021-08-27 NOTE — PROGRESS NOTES
PT DISCHARGE REPORT      PATIENT: Junie Carvalho   Date: 8/27/2021  DIAGNOSIS:  Parkinson's  PHYSICIAN:  Louis Castillo, ALISON - CNP  [address] [facility].,  [facility]     ATTENDANCE:  20  OUT OF 20 APPOINTMENT FROM  6/11/2021 TO 08/27/21  TREATMENTS RECEIVED:  THEREX, GAIT, BALANCE, HEP     INITIAL PROBLEM CURRENT STATUS        RENE BALANCE TEST = 45 54                 DECREASED STRENGTH RLE      HIP              Flex= 4-/5              Abd= 4-/5              Add= 4-/5  Knee    flex = 4/5    / =  4/5  Ankle    pf=      4/5    df=     4/5              HIP              Flex=5-/5              Abd= 5-/5              Add= 5-/5  Knee    flex = 5-/5    / =  5-/5  Ankle    pf=      5/5    df=     5/5       DECREASED STRENGTH RLE      HIP              Flex= 4-/5              Abd= 4-/5              Add= 4-/5  Knee    flex = 4/5    / =  4/5  Ankle    pf=      4/5    df=     4/5        HIP              Flex= 5-/5              Abd= 5-/5              Add= 5-/5  Knee    flex = 5-/5    / =  5-/5  Ankle    pf=      5/5    df=     5/5     COMMENTS/ RECOMMENDATIONS: Pt has progressed well with balance and safe functional mobility. Pt given strategies to improve safety at home and in community. Pt is discharged from PT at this time.     [address] YOU FOR THE OPPORTUNITY TO WORK WITH WITH THIS PATIENT  Fredrick Laws PT            IF YOU HAVE ANY QUESTIONS OR COMMENTS, PLEASE FEEL FREE TO CONTACT ME AT   [address] [address]  FAX : [phone]  PHONE: [phone]

## 2021-11-30 NOTE — PROGRESS NOTES
[facility]. Jesus Anna M.D., F.A.C.P. Albin Crigler, JUNIOR, APRN, CNS  Parker Winters. Pieter Watson, MSN, APRN-FNP-C  Terri Mccormack MSN, APRN, FNP-C  Amy BREWER, PA-MELLO  [address] MSN, APRN, FNP-C  [address] [facility]  [place], [address]  Phone: [phone]  Fax: [phone]         Adalberto Vega is a 70 y.o. right handed male     We are following him for Parkinson's    He presents with his wife and both are good historians. He has noticed an improvement in his tremors and mobility with increasing Sinemet 50/200 mg to every 4 hours while awake. He does notice some slight sticking movements of his right foot which are brief and improve when he walks, though he will drag the right foot at times. No falls. No lightheadedness. Sleep is variable, and he is sometimes up 2-3 times per night to urinate--- or just awakens for no reason. He is not being physically active, and states he feels fatigued during the day. He denies any other mood symptoms. He is no longer in PT. He has further cut down his alcohol intake drinks a few cans of beer every week. No hallucinations or memory changes. Medications:     Current Outpatient Medications on File Prior to Visit   Medication Sig Dispense Refill    carbidopa-levodopa (SINEMET)  MG per tablet Take 2 tablets by mouth every 4 hours (while awake) 720 tablet 3    warfarin (COUMADIN) 5 MG tablet 75 mg on Sunday and 5 mg Monday through Saturday      Verapamil HCl  MG CP24 Take by mouth       No current facility-administered medications on file prior to visit.      Objective:     /74 (Site: Right Upper Arm)   Pulse 68   Temp 97.9 °F (36.6 °C)   Ht 5' 10\" (1.778 m)   Wt 163 lb (73.9 kg)   SpO2 95%   BMI 23.39 kg/m²     General appearance: alert, appears stated age, cooperative and in no distress--hypomimia  Head: normocephalic, without obvious abnormality, atraumatic  Eyes: conjunctivae/corneas clear; no drainage  Neck:  mildly limited ROM with no cogwheeling  Lungs: clear to auscultation bilaterally  Heart: irreg irregular rate and rhythm  Extremities: normal, atraumatic, no cyanosis or edema  Pulses: 2+ and symmetric  Skin:  color, texture, turgor normal--no rashes or lesions      Mental Status: alert and oriented x 4--pleasant    Appropriate attention/concentration  Intact fundus of knowledge  Memories intact    Speech: no dysarthria; hypophonic  Language: no aphasias    Cranial Nerves:  I: smell    II: visual acuity     II: visual fields Full    II: pupils LISA   III,VII: ptosis None   III,IV,VI: extraocular muscles  EOMI without nystagmus   V: mastication Normal   V: facial light touch sensation  Normal   V,VII: corneal reflex     VII: facial muscle function - upper  Normal   VII: facial muscle function - lower Normal   VIII: hearing Normal   IX: soft palate elevation  Normal   IX,X: gag reflex    XI: trapezius strength  5/5   XI: sternocleidomastoid strength 5/5   XI: neck extension strength  5/5   XII: tongue strength  Normal     No Myerson's    Motor:  5/5 throughout  Decreased bulk  No cogwheeling  No tremors today  Improved bradykinesias    Sensory:  LT normal    Coordination:   Hyperkinesis with FFM and TYRESE R hand; no ataxia    Gait:  Slight dragging of R foot  Wide based  Decreased arm swing--no festination    DTR:   Right Brachioradialis reflex 1+  Left Brachioradialis reflex 1+  Right Biceps reflex 1+  Left Biceps reflex 1+  Right Quadriceps reflex 2+  Left Quadriceps reflex 2+  Right Achilles reflex 1+  Left Achilles reflex 1+    No Saavedra's    No other pathological reflexes    Laboratory/Radiology:  ry/Radiology:      No current labs or images to review    Assessment:     Parkinson's disease: Motor symptoms are fairly well controlled on current Sinemet dosage, but his slight right foot dragging should be monitored. May benefit from AFO in the future.   I question if his fatigue is a manifestation of depression or a result of his interrupted sleep.     Alcoholism    A. fib: On [address]    Depression    Plan:     Continue Sinemet 50/200 mg every 4 hours WA    Recommend melatonin nightly for sleep; sleep hygiene reviewed    Strongly recommend daily routine exercise    Consider SSRI in the future    He has a routine visit with PCP this month and will get labs    Advised no alcohol    RTO in 4 mos or or sooner ALISON Hutchins CNP  3:27 PM  11/30/2021

## 2021-12-01 ENCOUNTER — OFFICE VISIT (OUTPATIENT)
Dept: NEUROLOGY | Age: 71
End: 2021-12-01
Payer: MEDICARE

## 2021-12-01 VITALS
TEMPERATURE: 97.9 F | OXYGEN SATURATION: 95 % | BODY MASS INDEX: 23.34 KG/M2 | HEART RATE: 68 BPM | DIASTOLIC BLOOD PRESSURE: 74 MMHG | SYSTOLIC BLOOD PRESSURE: 110 MMHG | WEIGHT: 163 LBS | HEIGHT: 70 IN

## 2021-12-01 DIAGNOSIS — G20 PARKINSON'S DISEASE (HCC): Primary | ICD-10-CM

## 2021-12-01 PROCEDURE — 99213 OFFICE O/P EST LOW 20 MIN: CPT | Performed by: NURSE PRACTITIONER

## 2021-12-01 NOTE — PATIENT INSTRUCTIONS
Patient Education        Parkinson's Disease: Care Instructions  Overview  When you have Parkinson's disease, part of your brain cannot make enough dopamine, a chemical that helps control movement. The disease can cause tremors, stiffness, and problems with movement. Severe or advanced cases can also cause problems with thinking. Taking your medicines correctly and getting regular exercise may help you maintain your quality of life. There are many things that can cause Parkinson's disease symptoms, including some medicine, some toxins, and trauma to the head. The cause in most cases is not known. Follow-up care is a key part of your treatment and safety. Be sure to make and go to all appointments, and call your doctor if you are having problems. It's also a good idea to know your test results and keep a list of the medicines you take. How can you care for yourself at home? General care  · Take your medicines exactly as prescribed. Call your doctor if you think you are having a problem with your medicine. · Make sure your home is safe:  ? Place furniture so that you have something to hold on to as you walk around the house. ? Use chairs that make it easier to sit down and stand up. ? Group the things you use most, such as reading glasses, keys, and the telephone, in one easy-to-reach place. ? Tack down rugs so that you do not trip. ? Put no-slip tape and handrails in the tub to prevent falls. · Use a cane, walker, or scooter if your doctor suggests it. · Keep up your normal activities as much as you can. · Find ways to manage stress, which can make symptoms worse. · Spend time with family and friends. Join a support group for people with Parkinson's disease if you want extra help. · Depression is common with this condition. Tell your doctor if you have trouble sleeping, are eating too much or are not hungry, or feel sad or tearful all the time.  Depression can be treated with medicine and counseling. Diet and exercise  · Eat a balanced diet. · If you are taking levodopa, do not eat protein at the same time you take your medicine. Levodopa may not work as well if you take it at the same time you eat protein. You can eat normal amounts of protein. Talk to your doctor if you have questions. · If you have problems swallowing, change how and what you eat:  ? Try thick drinks, such as milk shakes. They are easier to swallow than other fluids. ? Do not eat foods that crumble easily. These can cause choking. ? Use a  to prepare food. Soft foods need less chewing. ? Eat small meals often so that you do not get tired from eating heavy meals. · Drink plenty of water and eat a high-fiber diet to prevent constipation. Parkinson's--and the medicines that treat it--may slow your intestines. · Get exercise on most days. Work with your doctor to set up a program of walking, swimming, or other exercise you are able to do. When should you call for help? Call your doctor now or seek immediate medical care if:    · You have a change in your symptoms.     · You develop other problems from your condition, such as:  ? Injury from a fall. ? Thinking or memory problems. ? A urinary tract infection (burning pain when urinating). Watch closely for changes in your health, and be sure to contact your doctor if:    · You lose weight because of problems with eating.     · You want more information about your condition or your medicines. Where can you learn more? Go to https://[employer].Brazzlebox. org and sign in to your [employer] account. Enter I778 in the [facility] box to learn more about \"Parkinson's Disease: Care Instructions. \"     If you do not have an account, please click on the \"Sign Up Now\" link. Current as of: April 8, 2021               Content Version: 13.0  © 4508-5404 Healthwise, Incorporated. Care instructions adapted under license by [facility] ([facility]).  If you have questions about a medical condition or this instruction, always ask your healthcare professional. [place] [address] any warranty or liability for your use of this information.

## 2022-04-07 ENCOUNTER — OFFICE VISIT (OUTPATIENT)
Dept: NEUROLOGY | Age: 72
End: 2022-04-07
Payer: MEDICARE

## 2022-04-07 VITALS
TEMPERATURE: 97.6 F | DIASTOLIC BLOOD PRESSURE: 96 MMHG | BODY MASS INDEX: 23.12 KG/M2 | SYSTOLIC BLOOD PRESSURE: 140 MMHG | OXYGEN SATURATION: 97 % | WEIGHT: 161.5 LBS | HEIGHT: 70 IN | HEART RATE: 91 BPM

## 2022-04-07 DIAGNOSIS — G20 PARKINSON'S DISEASE (HCC): Primary | ICD-10-CM

## 2022-04-07 PROCEDURE — 99214 OFFICE O/P EST MOD 30 MIN: CPT | Performed by: NURSE PRACTITIONER

## 2022-04-07 NOTE — PROGRESS NOTES
[address] [facility]. Maggi Mendez M.D., F.A.C.P. Mushtaq Rodriguez, JUNIOR, APRN, CNS  Lucy Mendez. Lori Velez, MSN, APRN-FNP-C  Bo Roman MSN, APRN, FNP-C  Thao BREWER PA-C  [address] MSN, APRN, FNP-C  [address], [facility] [place]  [place], [address]  Phone: [phone]  Fax: [phone]         Zee Goodwin is a 70 y.o. right handed male     We are following him for Parkinson's    He presents with his wife and both are good historians. He is now taking 50/200 mg of Sinemet every 4 hours while awake. He thinks that his tremors have improved since this change, but he will still have good and bad days. His girlfriend states the patient is rarely exercising, but previously found PT beneficial.  He states he has a lack of motivation to do anything. He does not think he is depressed. Voice is very quiet. No falls or freezing spells, but his right foot will occasionally feel stuck to the ground. No trouble swallowing. He now tells me he is sleeping well, and has not tried melatonin recommended at his last visit. No memory changes or hallucinations. No lightheadedness or falls. He states he is down to drinking one beer per week. No chest pain or palpitations  No SOB  No vertigo, lightheadedness or loss of consciousness  No falls, tripping or stumbling  No incontinence of bowels or bladder  No itching or bruising appreciated  No numbness, tingling or focal arm/leg weakness  No swallowing troubles    ROS otherwise negative     Medications:     Current Outpatient Medications on File Prior to Visit   Medication Sig Dispense Refill    carbidopa-levodopa (SINEMET)  MG per tablet Take 2 tablets by mouth every 4 hours (while awake) 720 tablet 3    warfarin (COUMADIN) 5 MG tablet 75 mg on Sunday and 5 mg Monday through Saturday      Verapamil HCl  MG CP24 Take by mouth       No current facility-administered medications on file prior to visit. Objective:     BP (!) 140/96 (Site: Left Upper Arm)   Pulse 91   Temp 97.6 °F (36.4 °C)   Ht 5' 10\" (1.778 m)   Wt 161 lb 8 oz (73.3 kg)   SpO2 97%   BMI 23.17 kg/m²     General appearance: alert, appears stated age, cooperative and in no distress--hypomimia  Head: normocephalic, without obvious abnormality, atraumatic  Eyes: conjunctivae/corneas clear; no drainage  Neck:  mildly limited ROM with no cogwheeling  Lungs: clear to auscultation bilaterally  Heart: irreg irregular rate and rhythm  Extremities: normal, atraumatic, no cyanosis or edema  Pulses: 2+ and symmetric  Skin:  color, texture, turgor normal--no rashes or lesions    Mental Status: alert and oriented x 4--pleasant    Appropriate attention/concentration  Intact fundus of knowledge  Memories intact    Speech: no dysarthria; hypophonic  Language: no aphasias    Cranial Nerves:  I: smell    II: visual acuity     II: visual fields Full    II: pupils LISA   III,VII: ptosis None   III,IV,VI: extraocular muscles  EOMI without nystagmus   V: mastication Normal   V: facial light touch sensation  Normal   V,VII: corneal reflex     VII: facial muscle function - upper  Normal   VII: facial muscle function - lower Normal   VIII: hearing Normal   IX: soft palate elevation  Normal   IX,X: gag reflex    XI: trapezius strength  5/5   XI: sternocleidomastoid strength 5/5   XI: neck extension strength  5/5   XII: tongue strength  Normal     No Myerson's    Motor:  5/5 throughout  Decreased bulk  No cogwheeling  Mild intermittent resting, pill-rolling tremor of right hand    Sensory:  LT normal    Coordination:   Hyperkinesis with FFM and TYRESE R hand; no ataxia    Gait:  Right foot initially gets stuck upon initiation of ambulation and then gait improved  Wide based  Decreased arm swing--no festination    DTR:   Right Brachioradialis reflex 1+  Left Brachioradialis reflex 1+  Right Biceps reflex 1+  Left Biceps reflex 1+  Right Quadriceps reflex 2+  Left Quadriceps reflex 2+  Right Achilles reflex 1+  Left Achilles reflex 1+    No Saavedra's    No other pathological reflexes    Laboratory/Radiology:  ry/Radiology:      No current labs or images to review    Assessment:     Parkinson's disease: Motor symptoms are fairly well controlled on current Sinemet dosing, but if he does not begin a routine exercise program his disease will progress more rapidly. He may benefit from reinitiation of physical therapy.   I still question underlying depression, and he has hypophonia on exam, but no other nonmotor symptoms    Alcoholism: admits to one beer weekly    A. fib: On [address]    Plan:     Continue Sinemet 50/200 mg every 4 hours WA    Referral back to [facility] in [place]    Pt declining ST referral    Strongly recommend daily routine exercise    Obtain labs from PCP    Strongly advised alcohol cessation    RTO in 4 mos or or sooner ALISON Zheng - CNP  12:03 PM  4/7/2022

## 2022-04-07 NOTE — PATIENT INSTRUCTIONS
Patient Education        Parkinson's Disease: Care Instructions  Overview  When you have Parkinson's disease, part of your brain cannot make enough dopamine, a chemical that helps control movement. The disease can cause tremors, stiffness, and problems with movement. Severe or advanced cases can also cause problems with thinking. Taking your medicines correctly and gettingregular exercise may help you maintain your quality of life. There are many things that can cause Parkinson's disease symptoms, including some medicine, some toxins, and trauma to the head. The cause in most cases isnot known. Follow-up care is a key part of your treatment and safety. Be sure to make and go to all appointments, and call your doctor if you are having problems. It's also a good idea to know your test results and keep alist of the medicines you take. How can you care for yourself at home? General care   Take your medicines exactly as prescribed. Call your doctor if you think you are having a problem with your medicine.  Make sure your home is safe:  ? Place furniture so that you have something to hold on to as you walk around the house. ? Use chairs that make it easier to sit down and stand up. ? Group the things you use most, such as reading glasses, keys, and the telephone, in one easy-to-reach place. ? Tack down rugs so that you do not trip. ? Put no-slip tape and handrails in the tub to prevent falls.  Use a cane, walker, or scooter if your doctor suggests it.  Keep up your normal activities as much as you can.  Find ways to manage stress, which can make symptoms worse.  Spend time with family and friends. Join a support group for people with Parkinson's disease if you want extra help.  Depression is common with this condition. Tell your doctor if you have trouble sleeping, are eating too much or are not hungry, or feel sad or tearful all the time.  Depression can be treated with medicine and counseling. Diet and exercise   Eat a balanced diet.  If you are taking levodopa, do not eat protein at the same time you take your medicine. Levodopa may not work as well if you take it at the same time you eat protein. You can eat normal amounts of protein. Talk to your doctor if you have questions.  If you have problems swallowing, change how and what you eat:  ? Try thick drinks, such as milk shakes. They are easier to swallow than other fluids. ? Do not eat foods that crumble easily. These can cause choking. ? Use a  to prepare food. Soft foods need less chewing. ? Eat small meals often so that you do not get tired from eating heavy meals.  Drink plenty of water and eat a high-fiber diet to prevent constipation. Parkinson's--and the medicines that treat it--may slow your intestines.  Get exercise on most days. Work with your doctor to set up a program of walking, swimming, or other exercise you are able to do. When should you call for help? Call your doctor now or seek immediate medical care if:     You have a change in your symptoms.      You develop other problems from your condition, such as:  ? Injury from a fall. ? Thinking or memory problems. ? A urinary tract infection (burning pain when urinating). Watch closely for changes in your health, and be sure to contact your doctor if:     You lose weight because of problems with eating.      You want more information about your condition or your medicines. Where can you learn more? Go to https://Refinder [employer]. org and sign in to your Vostu account. Enter P756 in the [facility] box to learn more about \"Parkinson's Disease: Care Instructions. \"     If you do not have an account, please click on the \"Sign Up Now\" link. Current as of: December 13, 2021               Content Version: 13.2  © 9539-8249 Healthwise, Incorporated. Care instructions adapted under license by [facility] [employer] [facility] ([facility]).  If you have questions about a medical condition or this instruction, always ask your healthcare professional. [address] any warranty or liability for your use of this information. Patient Education        Learning About Alcohol Use Disorder  What is alcohol use disorder? Alcohol use disorder means that a person drinks alcohol even though it causes harm to themselves or others. It can range from mild to severe. The more signs of this disorder you have, the more severe it may be. Moderate to severe alcohol use disorder is sometimes called addiction. People who have it may findit hard to control their use of alcohol. People who have this disorder may argue with others about how much they're drinking. Their job may be affected because of drinking. They may drink when it's dangerous or illegal, such as when they drive. They also may have a strong need, or craving, to drink. They may feel like they must drink just to get by. Their drinking may increase their risk of getting hurt or being in a car crash. Over time, drinking too much alcohol may cause health problems. These mayinclude high blood pressure, liver problems, or problems with digestion. What are the signs? Maybe you've wondered about your alcohol habits, or how to tell if yourdrinking is becoming a problem. Here are some of the signs of alcohol use disorder. You may have it if you havetwo or more of the following signs:   You drink larger amounts of alcohol than you ever meant to. Or you've been drinking for a longer time than you ever meant to.  You can't cut down or control your use. Or you constantly wish you could cut down.  You spend a lot of time getting or drinking alcohol or recovering from its effects.  You have strong cravings for alcohol.  You can no longer do your main jobs at work, at school, or at home.  You keep drinking alcohol, even though your use hurts your relationships.    You have stopped doing important activities because of your alcohol use.  You drink alcohol in situations where doing so is dangerous.  You keep drinking alcohol even though you know it's causing health problems.  You need more and more alcohol to get the same effect, or you get less effect from the same amount over time. This is called tolerance.  You have uncomfortable symptoms when you stop drinking alcohol or use less. This is called withdrawal.  Alcohol use disorder can range from mild to severe. The more signs you have, the more severe the disorder may be. Moderate to severe alcohol use disorder issometimes called addiction. You might not realize that your drinking is a problem. You might not drink large amounts when you drink. Or you might go for days or weeks between drinking episodes. But even if you don't drink very often, your drinking couldstill be harmful and put you at risk. How is alcohol use disorder treated? Getting help is up to you. But you don't have to do it alone. There are manypeople and kinds of treatments that can help. Treatment for alcohol use disorder can include:   Group therapy, one or more types of counseling, and alcohol education.  Medicines that help to:  ? Reduce withdrawal symptoms and help you safely stop drinking. ? Reduce cravings for alcohol.  Support groups. These groups include Alcoholics Anonymous and Service Seeking (Self-Management and Recovery Training). Some people are able to stop or cut back on drinking with help from a counselor. People who have moderate to severe alcohol use disorder may needmedical treatment. They may need to stay in a hospital or treatment center. You may have a treatment team to help you. This team may include a psychologist or psychiatrist, counselors, doctors, social workers, nurses, and a casemanager. A  helps plan and manage your treatment. Follow-up care is a key part of your treatment and safety.  Be sure to make and go to all appointments, and call your doctor if you are having problems. It's also a good idea to know your test results and keep alist of the medicines you take. Where can you learn more? Go to https://chpezenon.[employer]. org and sign in to your BitGo account. Enter [id number] in the [facility] box to learn more about \"Learning About Alcohol Use Disorder. \"     If you do not have an account, please click on the \"Sign Up Now\" link. Current as of: November 8, 2021               Content Version: 13.2  © 6625-7824 Healthwise, Incorporated. Care instructions adapted under license by [facility] ([facility]). If you have questions about a medical condition or this instruction, always ask your healthcare professional. [address] any warranty or liability for your use of this information.

## 2022-04-15 NOTE — PROGRESS NOTES
[facility]  Physical Therapy Daily Treatment Note  Date: 4/15/2022  Patient Name: Isidro Mercaod  : 1950   MRN: [id number]  Referring Provider: ALISON Gracia CNP  [address],  [address] [facility]     Medical Diagnosis: Parkinson's    Outcome Measure: RENE= 52    S: See eval.   O:     Time 14:20-15:06 2x/week x 6 weeks (12)      Visit     Pain 0/10    ROM      Exercise     Nustep L5 x 8 min Seat 11, arms 11   Hamstring Curl alt      calf raises     Hip abd alt     Hip ext     March with AW     THERAPEUTIC ACTIVITIES Large, functional, dynamic, global movements used to build strength, balance, endurance, and flexibility and to improve physical performance. Step-ups - FWD Alt. 6\" x 2 min    Step-ups - LAT 6\" x 2 min, up & over    Step-ups - BWD     Alt lunges 6\" x 2 min     NMR Feedback and cues necessary for developing neuromuscular control. Movement education and guided movement interventions  used to improve performance and control. To improve balance for safe community and home ambulation   March      Side stepping     Retro walk heel toe     Heel to toe     Fwd with cones     Ball push with cane     A:  Tolerated well. P: Continue POC.   Cristina Arguello PT    Treatment Charges: Mins Units   Initial Evaluation 31   1   Re-Evaluation     Ther Exercise         TE  15 1    Manual Therapy     MT     Ther Activities        TA     Gait Training          GT     Neuro Re-education NR     Modalities     Non-Billable Service Time     Other     Total Time/Units 46 2

## 2022-04-15 NOTE — PROGRESS NOTES
[facility] OUTPATIENT REHABILITATION  PHYSICAL THERAPY INITIAL EVALUATION         Date:  2022   Patient: Christel Doshi  : 1950  MRN: [id number]  Referring Provider: ALISON Lyle - CNP  [address],  [address] [facility]     Medical Diagnosis: Parkinson's Disease  Onset date: 12 months   Mechanism of Injury: Insidious onset  Chief complaint: Difficulty initiating gait, balance issues      SUBJECTIVE:     Past Medical History  Past Medical History:   Diagnosis Date    [facility]fib ([facility]     Alcoholism ([facility]     HTN (hypertension)     Parkinson's disease ([facility] [facility]      Past Surgical History:   Procedure Laterality Date    INGUINAL HERNIA REPAIR Left     as a baby    KNEE SURGERY Right        Medications:   Current Outpatient Medications   Medication Sig Dispense Refill    carbidopa-levodopa (SINEMET)  MG per tablet Take 2 tablets by mouth every 4 hours (while awake) 720 tablet 3    warfarin (COUMADIN) 5 MG tablet 75 mg on  and 5 mg Monday through Saturday      Verapamil HCl  MG CP24 Take by mouth       No current facility-administered medications for this encounter. Imaging results: MRI BRAIN WO CONTRAST    Result Date: 2021  EXAMINATION: MRI OF THE BRAIN WITHOUT CONTRAST  2021 8:31 am TECHNIQUE: Multiplanar multisequence MRI of the brain was performed without the administration of intravenous contrast. COMPARISON: None. HISTORY: ORDERING SYSTEM PROVIDED HISTORY: Parkinson's disease [facility]) TECHNOLOGIST PROVIDED HISTORY: Reason for exam:->parkinson's disease FINDINGS: INTRACRANIAL STRUCTURES/VENTRICLES: There is no acute infarct. No mass effect, edema or hemorrhage is seen. Mild volume loss is seen in the cerebrum with mild chronic microvascular ischemic changes. Both are in the range that is typical for age. The skull base arterial flow voids are intact. No hydrocephalus or extra-axial fluid is seen.  ORBITS: The visualized portion of the orbits demonstrate no acute abnormality. SINUSES:  A mucous retention cyst is seen in the right maxillary sinus. Mild scattered mucosal thickening in the paranasal sinuses. Small to moderate volume bilateral mastoid effusions. BONES/SOFT TISSUES: The bone marrow signal intensity appears normal. The soft tissues demonstrate no acute abnormality. 1. No acute intracranial abnormality. 2. No mass effect, edema or hemorrhage. 3. Bilateral mastoid effusions, which may be due to eustachian tube dysfunction or otomastoiditis. Clinical correlation is needed.        Pain:    Current: 0/10          Behavior: condition is staying the same    Occupation: Retired    Exercise regimen: none    Hobbies: fishing , hunting     Patient Goals: Walk better , get stronger  Medical Management for Current Problem:  [] Chiro  []  CT:  []  Injection:   [x]  Meds:   []  MRI:  []  Ortho  []  PCP  []  PT/OT  []  X-ray:  []  Surgery:  []  Other:     Precautions/Contraindications:     OBJECTIVE:     Inspection:  Standing  Knees:  [x] Normal  [] Genu Valgus [] Genu Varus  [] Genu Recurvatum    Tibia:  [x] Normal  [] Tibial Varus  [] Tibial Varum    Feet:  [x] Normal  [] Calcaneal Valgus   [] Calcaneal Varus   [] Hallux Valgus    [] Supination  [] Pronation          [] Pes Planus    [] Pes Cavus            Gait:  Decreased arm swing, wide LANDON with STC    Joint/Motion:    Hip:        Right:            WNL        Left:          WNL    Knee:      Right:           WNL      Left:          WNL  Ankle:       Right:           WNL       Left:          WNL  Strength:        Hip:              Right: Flexion 4/5,                 Left: Flexion 4/5,     Knee:         Right: Flexion 4/5,  Extension 4/5        Left: Flexion 4/5,  Extension 4/5    Ankle:          Right: Dorsiflexion 4/5, Plantarflexion ,4/5,                Left: Dorsiflexion 4/5, Plantarflexion     4/5,       ASSESSMENT      Patient's main problems LE weakness, balance issues and difficulty initiating gait. Problems:    1. Decreased Strength Quique hip flexion  2. Abnormal gait  3. Decreased balance  4. Decreased functional ability with walking, standing, lifting, pushing, pulling, stair climbing    [x] There are no barriers affecting plan of care or recovery    [] Barriers to this patient's plan of care or recovery include. Domestic Concerns:  [x] No  [] Yes:      Short Term goals (3 weeks)    1. Increase Strength by 1/3 mm grade  2. Independent with Home Exercise Programs    Long Term goals (6 weeks)  1. Increase Strength to 5/5 quique hip flexion   2. Normal gait  3. Normal balance  4. Able to perform/complete the following functions/tasks: walking, standing, lifting, pushing, pulling, stair climbing    Outcome Measure: RENE= 50    Rehab Potential: [x] Good  [] Fair  [] Poor    PLAN   Specific Provider Orders: Eval and treat    Physical therapy plan of care is established based on physician order, patient diagnosis and clinical assessment. Treatment Plan:  [x] Therapeutic Exercise  [x] Therapeutic Activity  [x] Neuromuscular Re-education   [x] Gait Training  [x] Balance Training  [] Aerobic conditioning  [] Manual Therapy  [] Massage/Fascial release   [] Work/Sport specific activities    [] Pain Neuroscience [] Cold/hotpack  [] Vasocompression  [] Electrical Stimulation  [] Lumbar/Cervical Traction  [] Ultrasound   [] Iontophoresis: 4 mg/mL Dexamethasone Sodium Phosphate 40-80 mAmin        [x] Instruction in HEP      []  Medication allergies reviewed for use of Dexamethasone Sodium Phosphate 4mg/ml  with iontophoresis treatments. Patient is not allergic.     Suggested Professional Referral: [x] No  [] Yes:     The following CPT codes are likely to be used in the care of this patient: 27647 PT Evaluation: Moderate Complexity , 64210 Therapeutic Exercise , 82948 Neuromuscular Re-Education , 29939 Therapeutic Activities , 24098 Gait Training     Patient Education:  [x] Plans/Goals, Risks/Benefits discussed  [x] Home exercise program  Method of Education: [x] Verbal  [x] Demo  [x] Written  Comprehension of Education:  [x] Verbalizes understanding. [x] Demonstrates understanding. [] Needs Review. [] Demonstrates/verbalizes understanding of HEP/Ed previously given. Frequency:   2 times per week for 6 weeks    Patient understands diagnosis/prognosis and consents to treatment, plan and goals: [x] Yes    [] No     Thank you for the opportunity to work with your patient. If you have questions or comments, please contact me at [phone]; fax: [phone]. Electronically signed by: Sandra Gan PT         Please sign Physician's Certification and return to:   [address] [place], [place] [id number]  Dept: [phone]  Dept Fax: [phone]: [phone] Certification / Comments     Frequency/Duration  2  times per week for  6 weeks. Certification period From: 4/18/2022  To:  6/18/2022    I have reviewed the Plan of Care established for skilled therapy services and certify that the services are required and that they will be provided while the patient is under my care.     Physician's Comments/Revisions:               Physician's Printed Name:                                           [de-identified] Signature:                                                               Date:

## 2022-04-18 ENCOUNTER — HOSPITAL ENCOUNTER (OUTPATIENT)
Dept: PHYSICAL THERAPY | Age: 72
Setting detail: THERAPIES SERIES
Discharge: HOME | End: 2022-04-18
Payer: MEDICARE

## 2022-04-18 PROCEDURE — 97110 THERAPEUTIC EXERCISES: CPT | Performed by: PHYSICAL THERAPIST

## 2022-04-18 PROCEDURE — 97162 PT EVAL MOD COMPLEX 30 MIN: CPT | Performed by: PHYSICAL THERAPIST

## 2022-04-20 ENCOUNTER — HOSPITAL ENCOUNTER (OUTPATIENT)
Dept: PHYSICAL THERAPY | Age: 72
Setting detail: THERAPIES SERIES
Discharge: HOME | End: 2022-04-20
Payer: MEDICARE

## 2022-04-20 PROCEDURE — 97530 THERAPEUTIC ACTIVITIES: CPT

## 2022-04-20 PROCEDURE — 97110 THERAPEUTIC EXERCISES: CPT

## 2022-04-20 PROCEDURE — 97112 NEUROMUSCULAR REEDUCATION: CPT

## 2022-04-20 NOTE — PROGRESS NOTES
[facility]  Physical Therapy Daily Treatment Note  Date: 2022  Patient Name: Jelani Wallace  : 1950   MRN: [id number]  Referring Provider: ALISON Lock - CNP  [place],   [facility], [place] Diagnosis: Parkinson's    Outcome Measure: RENE= 52    S:  Patient reports CC of having difficulty initiating first step during gait. O:     Time 7245-6886 2x/week x 6 weeks (12)      Visit     Pain 0/10    ROM      Exercise     Nustep L5 x 8 min Seat 11, arms 11   Hamstring Curl alt      calf raises     Hip abd alt Alt. 2# x 2 min    Hip ext     March with AW 2# x 2 min    THERAPEUTIC ACTIVITIES Large, functional, dynamic, global movements used to build strength, balance, endurance, and flexibility and to improve physical performance. Step-ups - FWD Alt. 6\" x 2 min    Step-ups - LAT 6\" x 2 min, up & over    Step-ups - BWD     Alt lunges 6\" x 2 min     NMR Feedback and cues necessary for developing neuromuscular control. Movement education and guided movement interventions  used to improve performance and control. To improve balance for safe community and home ambulation   March 4 x 20'     Side stepping 4 x 20'    Retro walk      Heel to toe     Ladder/grid:   Lateral 2 in/ 2 out  Zig Zag grid    X 2 SBA  X 2 SBA         A:  Tolerated well. P: Continue POC.   Yolanda Bautista, JAZZMINE    Treatment Charges: Mins Units   Initial Evaluation       Re-Evaluation     Ther Exercise         TE  15 1    Manual Therapy     MT     Ther Activities        TA 15 1   Gait Training          GT     Neuro Re-education NR 10 1   Modalities     Non-Billable Service Time     Other     Total Time/Units 40 3

## 2022-04-25 ENCOUNTER — HOSPITAL ENCOUNTER (OUTPATIENT)
Dept: PHYSICAL THERAPY | Age: 72
Setting detail: THERAPIES SERIES
Discharge: HOME | End: 2022-04-25
Payer: MEDICARE

## 2022-04-25 PROCEDURE — 97110 THERAPEUTIC EXERCISES: CPT

## 2022-04-25 PROCEDURE — 97530 THERAPEUTIC ACTIVITIES: CPT

## 2022-04-25 NOTE — PROGRESS NOTES
[facility]  Physical Therapy Daily Treatment Note  Date: 2022  Patient Name: Mariano Hendricks  : 1950   MRN: [id number]  Referring Provider: ALISON Gallegos CNP  [address],  [address] [facility] Diagnosis: Parkinson's    Outcome Measure: RENE= 52    S:  Patient reports CC of having difficulty initiating first step during gait. O:     Time 7731-9347 2x/week x 6 weeks (12)      Visit 3/12    Pain 0/10    ROM      Exercise     Nustep L5 x 8 min Seat 11, arms 11   Hamstring Curl alt      calf raises     Hip abd alt Alt. 2# x 2 min, 1 hand hold    Hip ext     March with AW 2# x 2 min, 1 hand hold    THERAPEUTIC ACTIVITIES Large, functional, dynamic, global movements used to build strength, balance, endurance, and flexibility and to improve physical performance. Step-ups - FWD Alt. 6\" x 2 min    Step-ups - LAT 6\" x 2 min, up & over    Step-ups - BWD     Alt lunges 6\" x 2 min     NMR Feedback and cues necessary for developing neuromuscular control. Movement education and guided movement interventions  used to improve performance and control. To improve balance for safe community and home ambulation   March      Side stepping     Retro walk      Heel to toe     Ladder/grid:   Lateral 2 in/ 2 out  Zig Zag grid  Fwd high knee in ladder      X 2 SBA    X 2 SBA         A:  Tolerated fairly, today patient having increased difficulty initiating gait and moving feet during turns, has freezing of legs. VC's to slow pace and think about lifting feet. P: Continue POC.   Yajaira Yanez PTA    Treatment Charges: Mins Units   Initial Evaluation       Re-Evaluation     Ther Exercise         TE  15 1    Manual Therapy     MT     Ther Activities        TA 15 1   Gait Training          GT     Neuro Re-education NR 5 0   Modalities     Non-Billable Service Time     Other     Total Time/Units 35 2

## 2022-04-29 ENCOUNTER — HOSPITAL ENCOUNTER (OUTPATIENT)
Dept: PHYSICAL THERAPY | Age: 72
Setting detail: THERAPIES SERIES
Discharge: HOME | End: 2022-04-29
Payer: MEDICARE

## 2022-04-29 PROCEDURE — 97110 THERAPEUTIC EXERCISES: CPT

## 2022-04-29 PROCEDURE — 97530 THERAPEUTIC ACTIVITIES: CPT

## 2022-04-29 PROCEDURE — 97112 NEUROMUSCULAR REEDUCATION: CPT

## 2022-04-29 NOTE — PROGRESS NOTES
[facility]  Physical Therapy Daily Treatment Note  Date: 2022  Patient Name: Lc Cristina  : 1950   MRN: [id number]  Referring Provider: ALISON Miranda - CNP  [facility],  [address] [place] [address] Diagnosis: Parkinson's    Outcome Measure: RENE= 52    S:  Patient reports CC of having difficulty initiating first step during gait. O:     Time 1744-4038 2x/week x 6 weeks (12)      Visit     Pain 0/10    ROM      Exercise     Nustep L5 x 8 min Seat 11, arms 11   Hamstring Curl alt      calf raises     Hip abd alt Alt. 2# x 2 min, 1 hand hold    Hip ext     March with AW 2# x 2 min, 1 hand hold    THERAPEUTIC ACTIVITIES Large, functional, dynamic, global movements used to build strength, balance, endurance, and flexibility and to improve physical performance. Step-ups - FWD Alt. 6\" x 2 min    Step-ups - LAT 6\" x 2 min, up & over    Step-ups - BWD     Alt lunges 6\" x 2 min     NMR Feedback and cues necessary for developing neuromuscular control. Movement education and guided movement interventions  used to improve performance and control. To improve balance for safe community and home ambulation   March 4 x 20'     Side stepping 4 x 20'    Retro walk      Heel to toe     Ladder/grid:   Lateral 2 in/ 2 out  Zig Zag grid  Fwd high knee in ladder      X 2 SBA  X 2 SBA  X 2 SBA         A:  Tolerated fairly, difficulty initiating gait and moving feet during turns, has freezing of legs especially left. VC's to slow pace and think about lifting feet. P: Continue POC.   Tor Hews, PTA    Treatment Charges: Mins Units   Initial Evaluation       Re-Evaluation     Ther Exercise         TE 25  1    Manual Therapy     MT     Ther Activities        TA 10 1   Gait Training          GT     Neuro Re-education NR 15 1   Modalities     Non-Billable Service Time     Other     Total Time/Units 50 3

## 2022-05-02 ENCOUNTER — HOSPITAL ENCOUNTER (OUTPATIENT)
Dept: PHYSICAL THERAPY | Age: 72
Setting detail: THERAPIES SERIES
Discharge: HOME | End: 2022-05-02
Payer: MEDICARE

## 2022-05-02 PROCEDURE — 97112 NEUROMUSCULAR REEDUCATION: CPT

## 2022-05-02 PROCEDURE — 97110 THERAPEUTIC EXERCISES: CPT

## 2022-05-02 PROCEDURE — 97530 THERAPEUTIC ACTIVITIES: CPT

## 2022-05-02 NOTE — PROGRESS NOTES
[facility]  Physical Therapy Daily Treatment Note  Date: 2022  Patient Name: Mac Pelayo  : 1950   MRN: [id number]  Referring Provider: ALISON Canela - CNP  [facility],  [address] [place] [address] Diagnosis: Parkinson's    Outcome Measure: RENE= 52    S:  Patient reports CC of having difficulty initiating first step during gait. O:     Time 8710-6943 2x/week x 6 weeks (12)      Visit     Pain 0/10    ROM      Exercise     Nustep L5 x 8 min Seat 11, arms 11   Hamstring Curl alt      calf raises     Hip abd alt Alt. 2# x 2 min, 1 hand hold    Hip ext     March with AW 2# x 2 min, 1 hand hold    THERAPEUTIC ACTIVITIES Large, functional, dynamic, global movements used to build strength, balance, endurance, and flexibility and to improve physical performance. Step-ups - FWD Alt. 6\" x 2 min    Step-ups - LAT 6\" x 2 min, up & over    Step-ups - BWD     Alt lunges 6\" x 2 min     NMR Feedback and cues necessary for developing neuromuscular control. Movement education and guided movement interventions  used to improve performance and control. To improve balance for safe community and home ambulation   March 4 x 20'     Side stepping 4 x 20'    Retro walk      Heel to toe     Ladder/grid:   Lateral 2 in/ 2 out  Zig Zag grid  Fwd high knee in ladder      X 2 SBA    X 2 SBA    A:  Tolerated fairly, difficulty initiating gait and moving feet during turns, has freezing of legs especially left. VC's to slow pace and think about lifting feet. P: Continue POC.   Raudel Eric PTA    Treatment Charges: Mins Units   Initial Evaluation       Re-Evaluation     Ther Exercise         TE 22 1    Manual Therapy     MT     Ther Activities        TA 10 1   Gait Training          GT     Neuro Re-education NR 8 1   Modalities     Non-Billable Service Time     Other     Total Time/Units 40 3

## 2022-05-06 ENCOUNTER — HOSPITAL ENCOUNTER (OUTPATIENT)
Dept: PHYSICAL THERAPY | Age: 72
Setting detail: THERAPIES SERIES
Discharge: HOME | End: 2022-05-06
Payer: MEDICARE

## 2022-05-06 PROCEDURE — 97110 THERAPEUTIC EXERCISES: CPT

## 2022-05-06 PROCEDURE — 97112 NEUROMUSCULAR REEDUCATION: CPT

## 2022-05-06 PROCEDURE — 97530 THERAPEUTIC ACTIVITIES: CPT

## 2022-05-06 NOTE — PROGRESS NOTES
[facility]  Physical Therapy Daily Treatment Note  Date: 2022  Patient Name: Lambert Shaffer  : 1950   MRN: [id number]  Referring Provider: ALISON Bryson - CNP  [place],   [facility] Diagnosis: Parkinson's    Outcome Measure: RENE= 52    S:  Patient reports CC of having difficulty initiating first step during gait. O:     Time 8389-1834 2x/week x 6 weeks (12)      Visit     Pain 0/10    ROM      Exercise     Nustep L5 x 8 min Seat 11, arms 11   Hamstring Curl alt      calf raises     Hip abd alt Alt. 2# x 2 min, 1 hand hold    Hip ext     March with AW 2# x 2 min, 1 hand hold    THERAPEUTIC ACTIVITIES Large, functional, dynamic, global movements used to build strength, balance, endurance, and flexibility and to improve physical performance. Step-ups - FWD Alt. 6\" x 2 min    Step-ups - LAT 6\" x 2 min, up & over    Step-ups - BWD     Alt lunges 6\" x 2 min     NMR Feedback and cues necessary for developing neuromuscular control. Movement education and guided movement interventions  used to improve performance and control. To improve balance for safe community and home ambulation   March 4 x 20'     Side stepping 4 x 20'    Retro walk      Heel to toe     Ladder/grid:   Lateral 2 in/ 2 out  Zig Zag grid  Fwd high knee in ladder      X 2 SBA    X 2 SBA    A:  Tolerated fairly, difficulty initiating gait and moving feet during turns, has freezing of legs especially left. VC's to slow pace and think about lifting feet. See Progress Report for current status. P: Continue POC.   Brandt Lozano PTA    Treatment Charges: Mins Units   Initial Evaluation       Re-Evaluation     Ther Exercise         TE 25 1    Manual Therapy     MT     Ther Activities        TA 10 1   Gait Training          GT     Neuro Re-education NR 8 1   Modalities     Non-Billable Service Time     Other     Total Time/Units 43 3

## 2022-05-09 ENCOUNTER — HOSPITAL ENCOUNTER (OUTPATIENT)
Dept: PHYSICAL THERAPY | Age: 72
Setting detail: THERAPIES SERIES
Discharge: HOME | End: 2022-05-09
Payer: MEDICARE

## 2022-05-09 PROCEDURE — 97112 NEUROMUSCULAR REEDUCATION: CPT

## 2022-05-09 PROCEDURE — 97110 THERAPEUTIC EXERCISES: CPT

## 2022-05-09 PROCEDURE — 97530 THERAPEUTIC ACTIVITIES: CPT

## 2022-05-09 NOTE — PROGRESS NOTES
[facility]  Physical Therapy Daily Treatment Note  Date: 2022  Patient Name: Kristyn Bobby  : 1950   MRN: [id number]  Referring Provider: Subhash Holloway, APRN - JADA  [place]  [place],   [facility] Diagnosis: Parkinson's    Outcome Measure: RENE= 52    S:  Patient reports CC of having difficulty initiating first step during gait. He didn't sleep well, feels tired today. O:     Time 7778-7571 2x/week x 6 weeks (12)      Visit  Re-Eval done @ midpoint   Pain 0/10    ROM      Exercise     Nustep L5 x 8 min Seat 11, arms 11   Hamstring Curl alt      calf raises     Hip abd alt Alt. 2# x 2 min, 1 hand hold    Hip ext     March with AW 2# x 2 min, 1 hand hold    THERAPEUTIC ACTIVITIES Large, functional, dynamic, global movements used to build strength, balance, endurance, and flexibility and to improve physical performance. Step-ups - FWD Alt. 6\" x 2 min    Step-ups - LAT 6\" x 2 min, up & over    Step-ups - BWD     Alt lunges 6\" x 2 min     NMR Feedback and cues necessary for developing neuromuscular control. Movement education and guided movement interventions  used to improve performance and control. To improve balance for safe community and home ambulation   March 4 x 20'     Side stepping 4 x 20'    Retro walk      Heel to toe 4 x 20'    Ladder/grid:   Lateral 2 in/ 2 out  Zig Zag grid  Fwd high knee in ladder      X 2 SBA        A:  Tolerated fairly, difficulty initiating gait and moving feet during turns, has freezing of legs especially left. VC's to slow pace and think about lifting feet. See Progress Report for current status. See Progress Report for current status. Copy of report to be sent to physician. P: Continue POC.   Omar Valenzuela PTA    Treatment Charges: Mins Units   Initial Evaluation       Re-Evaluation     Ther Exercise         TE 21 1    Manual Therapy     MT     Ther Activities        TA 10 1   Gait Training          GT Neuro Re-education NR 8 1   Modalities     Non-Billable Service Time     Other     Total Time/Units 39 3

## 2022-05-09 NOTE — PROGRESS NOTES
PT PROGRESS REPORT      PATIENT: Gary Dunbar   Date: 5/9/2022  DIAGNOSIS:  Parkinson's  PHYSICIAN:  Genet Bell, APRN - CNP  [place]  [place],  [address] [facility]     ATTENDANCE:  7  OUT OF 7 APPOINTMENT FROM  4/18/2020 TO 05/09/22  TREATMENTS RECEIVED:  THEREX, GAIT, BALANCE, HEP,      INITIAL PROBLEM CURRENT STATUS                  DECREASED STRENGTH LLE       HIP              Flex= 4/5  Knee    flex = 4/5    / =  4/5  Ankle    pf=      4/5    df=     4/5               HIP              Flex= 4+/5  Knee    flex = 4+/5    / =  4+/5  Ankle    pf=      4+/5    df=     4+/5       DECREASED STRENGTH RLE        HIP              Flex= 4/5  Knee    flex = 4/5    / =  4/5  Ankle    pf=      4/5    df=     4/5           HIP              Flex= 4+/5  Knee    flex = 4+/5    / =  4+/5  Ankle    pf=      4+/5    df=     4+/5   ABNORMAL GAIT:      Decreased hip flexion      Decreased knee flexion      Decreased arm swing      Wide LANDON with STC Improving     COMMENTS/ RECOMMENDATIONS: Pt progressing well with strength .  Pt has x5 sessions left on current POC      THANK YOU FOR THE OPPORTUNITY TO WORK WITH WITH THIS PATIENT  Anuradha Louise PT            IF YOU HAVE ANY QUESTIONS OR COMMENTS, PLEASE FEEL FREE TO CONTACT ME AT   Elieser Rojas [address]  [address] [place] [address]  FAX : [phone]  PHONE: [phone]    l

## 2022-05-13 ENCOUNTER — HOSPITAL ENCOUNTER (OUTPATIENT)
Dept: PHYSICAL THERAPY | Age: 72
Setting detail: THERAPIES SERIES
Discharge: HOME | End: 2022-05-13
Payer: MEDICARE

## 2022-05-13 PROCEDURE — 97112 NEUROMUSCULAR REEDUCATION: CPT

## 2022-05-13 PROCEDURE — 97530 THERAPEUTIC ACTIVITIES: CPT

## 2022-05-13 PROCEDURE — 97110 THERAPEUTIC EXERCISES: CPT

## 2022-05-13 NOTE — PROGRESS NOTES
[facility]  Physical Therapy Daily Treatment Note  Date: 2022  Patient Name: Christel Doshi  : 1950   MRN: [id number]  Referring Provider: ALISON Lyle - CNP  [place]  [place],   [facility] Diagnosis: Parkinson's    Outcome Measure: RENE= 52    S: Patient reports CC of having difficulty initiating first step during gait especially left foot. Patient feels discouraged, though he states symptoms are not worsening. Discussed the need for reciprocal, exaggerated movements, balance, strength and flexibility exercises. Gave patient link to \"Treating Parkinson's with Bk Chi\" for patient to watch only, we can discuss next week if he is interested in that type of home exercises that he can perform safely at home. O:     Time 9703-6176 2x/week x 6 weeks (12)      Visit  Re-Eval done @ midpoint   Pain 0/10    ROM      Exercise     Nustep L5 x 8 min Seat 11, arms 11   Hamstring Curl alt      calf raises     Hip abd alt Alt. 2# x 2 min, 1 hand hold    Hip ext     March with AW 2# x 2 min, 1 hand hold    THERAPEUTIC ACTIVITIES Large, functional, dynamic, global movements used to build strength, balance, endurance, and flexibility and to improve physical performance. Step-ups - FWD Alt. 6\" x 2 min    Step-ups - LAT 6\" x 2 min, up & over    Step-ups - BWD     Alt lunges 6\" x 2 min    chops 2 kg x 20 each    Squat to overhead lifts 2 kg x 15     NMR Feedback and cues necessary for developing neuromuscular control. Movement education and guided movement interventions  used to improve performance and control. To improve balance for safe community and home ambulation   March 4 x 20' with alt arm movements    Side stepping 4 x 20' march movement    Retro walk      Heel to toe 4 x 20'    Navigate around cones 4 x 20' +1 GC, LOB x 2         A:  Tolerated fairly, difficulty initiating gait and moving feet during turns, has freezing of legs especially left.  VC's to slow pace and think about lifting feet. Patient had LOB x 2 with cone navigation, upper body kept moving forward but he could not advance feet, needed assistance to correct. P: Continue POC.   [place], PTA    Treatment Charges: Mins Units   Initial Evaluation       Re-Evaluation     Ther Exercise         TE 20 1    Manual Therapy     MT     Ther Activities        TA 25 2   Gait Training          GT     Neuro Re-education NR 10 1   Modalities     Non-Billable Service Time     Other     Total Time/Units 55 4

## 2022-05-16 ENCOUNTER — HOSPITAL ENCOUNTER (OUTPATIENT)
Dept: PHYSICAL THERAPY | Age: 72
Setting detail: THERAPIES SERIES
Discharge: HOME | End: 2022-05-16
Payer: MEDICARE

## 2022-05-16 PROCEDURE — 97110 THERAPEUTIC EXERCISES: CPT

## 2022-05-16 PROCEDURE — 97530 THERAPEUTIC ACTIVITIES: CPT

## 2022-05-16 NOTE — PROGRESS NOTES
[facility]  Physical Therapy Daily Treatment Note  Date: 2022  Patient Name: Carole Bacon  : 1950   MRN: [id number]  Referring Provider: ALIOSN Cervantes - CNP  [facility],  [address] [place] [address] Diagnosis: Parkinson's    Outcome Measure: RENE= 52    S: Patient reports not feeling well, quivering feeling inside, may be limited in what he can do today; over did it yesterday. Patient reports CC of having difficulty initiating first step during gait especially left foot. Patient feels discouraged, though he states symptoms are not worsening. Discussed the need for reciprocal, exaggerated movements, balance, strength and flexibility exercises. Gave patient link to \"5 great Parkinson's exercises\" on Youtube that patient can try safely at home. O: Exercises modified today, patient having more difficulty that usual with coordination and endurance. Time 8223-1963 2x/week x 6 weeks (12)      Visit  Re-Eval done @ midpoint   Pain 0/10    ROM      Exercise     Nustep L5 x 8 min Seat 11, arms 11   Hamstring Curl alt      calf raises     Hip abd alt Alt. 2# x 2 min, 1 hand hold    Hip ext     March with AW 2# x 1 min 40 sec min,   2 hand hold today   THERAPEUTIC ACTIVITIES Large, functional, dynamic, global movements used to build strength, balance, endurance, and flexibility and to improve physical performance. Step-ups - FWD Alt. 6\" x 2 min    Step-ups - LAT 6\" x 2 min, up & over    Step-ups - BWD     Alt lunges 6\" x 1.5 min as tolerated    chops ND today, resume when able   Squat to overhead lifts     NMR Feedback and cues necessary for developing neuromuscular control. Movement education and guided movement interventions  used to improve performance and control.   To improve balance for safe community and home ambulation   March Hold today   Side stepping    Retro walk     Heel to toe    Navigate around cones         A:  Tolerated fairly, difficulty initiating gait and moving feet during turns, has freezing of legs especially left. VC's to slow pace and think about lifting feet. P: Continue POC.   Cabrera Butt, PTA    Treatment Charges: Mins Units   Initial Evaluation       Re-Evaluation     Ther Exercise         TE 20 1    Manual Therapy     MT     Ther Activities        TA 14 1   Gait Training          GT     Neuro Re-education NR     Modalities     Non-Billable Service Time     Other     Total Time/Units 34 2

## 2022-05-20 ENCOUNTER — HOSPITAL ENCOUNTER (OUTPATIENT)
Dept: PHYSICAL THERAPY | Age: 72
Setting detail: THERAPIES SERIES
Discharge: HOME | End: 2022-05-20
Payer: MEDICARE

## 2022-05-20 PROCEDURE — 97530 THERAPEUTIC ACTIVITIES: CPT

## 2022-05-20 PROCEDURE — 97112 NEUROMUSCULAR REEDUCATION: CPT

## 2022-05-20 PROCEDURE — 97110 THERAPEUTIC EXERCISES: CPT

## 2022-05-20 NOTE — PROGRESS NOTES
[facility]  Physical Therapy Daily Treatment Note  Date: 2022  Patient Name: Kristyn Bobby  : 1950   MRN: [id number]  Referring Provider: Subhash Holloway APRN - CNP  [place]  [place],   [facility] Diagnosis: Parkinson's    Outcome Measure: RENE= 52    S: Patient reports feeling better today. We've discussed the need for reciprocal, exaggerated movements, balance, strength and flexibility exercises. Patient had been given link to \"5 great Parkinson's exercises\" on [employer] that patient can try safely at home. O:   Time 2489-7744 2x/week x 6 weeks (12)      Visit 10/12 Re-Eval done @ midpoint   Pain 0/10    ROM      Exercise     Nustep L5 x 8 min Seat 11, arms 11   calf raises     Hip abd alt     Hip ext     March with AW     THERAPEUTIC ACTIVITIES Large, functional, dynamic, global movements used to build strength, balance, endurance, and flexibility and to improve physical performance. Step-ups - FWD Alt. 6\" x 2 min    Step-ups - LAT 6\" x 2 min, up & over    Step-ups - BWD     Alt lunges 6\" x 2 min     chops 2 kg x 20 each   Squat to overhead lifts 2 kg x 15    NMR Feedback and cues necessary for developing neuromuscular control. Movement education and guided movement interventions  used to improve performance and control. To improve balance for safe community and home ambulation   March 4 x 20' with alt arm movements CG for all NMR, especially for turns   Side stepping 4 x 20' step over movement    Retro walk  4 x 20'   Heel to toe 4 x 20'   Navigate around cones         A:  Tolerated well, some difficulty initiating gait and moving feet during turns, has freezing of legs especially left. VC's to slow pace and think about lifting feet. P: Continue POC.   Omar Valenzuela PTA    Treatment Charges: Mins Units   Initial Evaluation       Re-Evaluation     Ther Exercise         TE 8 1    Manual Therapy     MT     Ther Activities        TA 34 1   Gait Training          GT     Neuro Re-education NR     Modalities 10 1   Non-Billable Service Time     Other     Total Time/Units 52 3

## 2022-05-23 ENCOUNTER — HOSPITAL ENCOUNTER (OUTPATIENT)
Dept: PHYSICAL THERAPY | Age: 72
Setting detail: THERAPIES SERIES
Discharge: HOME | End: 2022-05-23
Payer: MEDICARE

## 2022-05-23 PROCEDURE — 97110 THERAPEUTIC EXERCISES: CPT | Performed by: PHYSICAL THERAPIST

## 2022-05-23 PROCEDURE — 97530 THERAPEUTIC ACTIVITIES: CPT | Performed by: PHYSICAL THERAPIST

## 2022-05-23 PROCEDURE — 97112 NEUROMUSCULAR REEDUCATION: CPT | Performed by: PHYSICAL THERAPIST

## 2022-05-23 NOTE — PROGRESS NOTES
PT PROGRESS REPORT      PATIENT: Vitaliy Murphy   Date: 5/23/2022  DIAGNOSIS:  Parkinson's  PHYSICIAN:  Homa Hernandez, APRN - CNP  [facility]  [place],  [address] [facility]     ATTENDANCE:  11  OUT OF 11 APPOINTMENT FROM  4/18/2020 TO 05/23/22  TREATMENTS RECEIVED:  THEREX, GAIT, BALANCE, HEP,      INITIAL PROBLEM 5/09/2022 CURRENT STATUS                     DECREASED STRENGTH LLE       HIP              Flex= 4/5  Knee    flex = 4/5    / =  4/5  Ankle    pf=      4/5    df=     4/5               HIP              Flex= 4+/5  Knee    flex = 4+/5    / =  4+/5  Ankle    pf=      4+/5    df=     4+/5         HIP              Flex= 5-/5  Knee    flex = 5-/5    / =  5-/5  Ankle    pf=      5-/5    df= 5-/5        DECREASED STRENGTH RLE        HIP              Flex= 4/5  Knee    flex = 4/5    / =  4/5  Ankle    pf=      4/5    df=     4/5           HIP              Flex= 4+/5  Knee    flex = 4+/5    / =  4+/5  Ankle    pf=      4+/5    df=     4+/5         HIP              Flex=5-/5  Knee    flex = 5-/5    / =  5-/5  Ankle    pf=     5-/5    df=     5/5   ABNORMAL GAIT:      Decreased hip flexion      Decreased knee flexion      Decreased arm swing      Wide LANDON with STC Improving Continues to improve     COMMENTS/ RECOMMENDATIONS: Pt progressing well with strength . Pt has [id number] left on current POC. Pt would benefit from additional PT 1-2x/wk for 8 additional visits to work on balance and initiation of LE movement for safe gait. COMMENTS/ RECOMMENDATIONS:    POC: Continue PT 1-2x/wk for 8 additiional visits: Therex,   Balance training, PRE, Gait, HEP. GOALS: 1. Improve dynamic standing balance                  2. Improve initiation of LE movement for safe gait                 3. Ambulate safely in community without a AD.      I have reviewed the Plan of Care established for skilled therapy services and certify that the services are required and that they will be provided while the patient is under my care.      ___________________         ___________  Physician signature                    date      [address] [facility] [address] TO WORK WITH WITH THIS [address] PT            IF YOU HAVE ANY QUESTIONS OR COMMENTS, PLEASE FEEL FREE TO CONTACT ME AT   Elieser Rojas [address]  [address] [id number] [address] [facility] [address] : [phone]  PHONE: [phone]

## 2022-05-27 ENCOUNTER — HOSPITAL ENCOUNTER (OUTPATIENT)
Dept: PHYSICAL THERAPY | Age: 72
Setting detail: THERAPIES SERIES
Discharge: HOME | End: 2022-05-27
Payer: MEDICARE

## 2022-05-27 PROCEDURE — 97530 THERAPEUTIC ACTIVITIES: CPT | Performed by: PHYSICAL THERAPIST

## 2022-05-27 PROCEDURE — 97110 THERAPEUTIC EXERCISES: CPT | Performed by: PHYSICAL THERAPIST

## 2022-05-27 PROCEDURE — 97112 NEUROMUSCULAR REEDUCATION: CPT | Performed by: PHYSICAL THERAPIST

## 2022-05-27 NOTE — PROGRESS NOTES
[facility]  Physical Therapy Daily Treatment Note  Date: 2022  Patient Name: Mac Pelayo  : 1950   MRN: [id number]  Referring Provider: Emily Thorne, ALISON - CNP  [place]  [place],   [facility] Diagnosis: Parkinson's    Outcome Measure: RENE= 52    S: Patient reports feeling better today. Right knee very painful from squats last session  O: Discontinue squats and reinstitute aw marching and hip abd at bar. Time 6013-8355 2x/week x 6 weeks (12)      Visit  Re-Eval done @ midpoint   Pain 0/10    ROM      Exercise     Nustep L5 x 8 min Seat 11, arms 11   calf raises     Hip abd alt Alt. 2# x 2 min, 1 hand hold    Hip ext     March with AW 2# x 2 min, 1 hand hold    THERAPEUTIC ACTIVITIES Large, functional, dynamic, global movements used to build strength, balance, endurance, and flexibility and to improve physical performance. Step-ups - FWD Alt. 6\" x 2 min    Step-ups - LAT 6\" x 2 min, up & over    Step-ups - BWD     Alt lunges 6\" x 2 min     chops 2 kg x 20 each     overhead lifts/ no squat 2 kg x 15     NMR Feedback and cues necessary for developing neuromuscular control. Movement education and guided movement interventions  used to improve performance and control. To improve balance for safe community and home ambulation   March 4 x 20' with alt arm movements CG for all NMR, especially for turns   Side stepping 4 x 20' step over movement    Retro walk  4 x 20'   Heel to toe 4 x 20'   Navigate around cones         A:  Tolerated well, some difficulty initiating gait and moving feet during turns, has freezing of legs especially left. VC's to slow pace and think about lifting feet. P: Continue POC. Waiting for approval from physician.   Brianna Mail, PT    Treatment Charges: Mins Units   Initial Evaluation       Re-Evaluation     Ther Exercise         TE 16 1    Manual Therapy     MT     Ther Activities        TA 30 2   Gait Training          GT Neuro Re-education NR 10 1   Modalities       Non-Billable Service Time     Other     Total Time/Units 56 4

## 2022-06-02 ENCOUNTER — HOSPITAL ENCOUNTER (OUTPATIENT)
Dept: PHYSICAL THERAPY | Age: 72
Setting detail: THERAPIES SERIES
Discharge: HOME | End: 2022-06-02
Payer: MEDICARE

## 2022-06-02 PROCEDURE — 97110 THERAPEUTIC EXERCISES: CPT | Performed by: PHYSICAL THERAPIST

## 2022-06-02 PROCEDURE — 97530 THERAPEUTIC ACTIVITIES: CPT | Performed by: PHYSICAL THERAPIST

## 2022-06-02 NOTE — PROGRESS NOTES
[facility]  Physical Therapy Daily Treatment Note  Date: 2022  Patient Name: Jackie Chapman  : 1950   MRN: [id number]  Referring Provider: ALISON Gardner - CNP  [place]  [place],   [facility] Diagnosis: Parkinson's    Outcome Measure: RENE= 52    S: Patient reports feeling good today other than R knee pain. O: Discontinue squats and reinstitute aw marching and hip abd at bar. Time 926- 2x/week x 6 weeks (12)      Visit  Requesting more visits Re-Eval done @ midpoint   Pain 7/10 R knee    ROM      Exercise     Nustep L5 x 8 min Seat 11, arms 11   calf raises     Hip abd alt Alt. 2# x 2 min, 1 hand hold    Hip ext     March with AW 2# x 2 min, 1 hand hold    THERAPEUTIC ACTIVITIES Large, functional, dynamic, global movements used to build strength, balance, endurance, and flexibility and to improve physical performance. Step-ups - FWD Alt. 6\" x 2 min    Step-ups - LAT 6\" x 2 min, up & over    Step-ups - BWD     Alt lunges 6\" x 2 min     chops 2 kg x 20 each     overhead lifts/ no squat 2 kg x 15     NMR Feedback and cues necessary for developing neuromuscular control. Movement education and guided movement interventions  used to improve performance and control. To improve balance for safe community and home ambulation   March 4 x 20' with alt arm movements CG for all NMR, especially for turns   Side stepping 4 x 20' step over movement    Retro walk  4 x 20'   Heel to toe 4 x 20'   Navigate around cones         A:  Tolerated well. Pt continues to have difficulty initiating movement when first up but is more steady after first few steps. P: Continue POC. Waiting for approval from physician.   Matt Horan PT    Treatment Charges: Mins Units   Initial Evaluation       Re-Evaluation     Ther Exercise         TE 20 1   Manual Therapy     MT     Ther Activities        TA 25 2   Gait Training          GT     Neuro Re-education NR     Modalities Non-Billable Service Time     Other     Total Time/Units 45 3

## 2022-06-06 ENCOUNTER — HOSPITAL ENCOUNTER (OUTPATIENT)
Dept: PHYSICAL THERAPY | Age: 72
Setting detail: THERAPIES SERIES
Discharge: HOME | End: 2022-06-06
Payer: MEDICARE

## 2022-06-06 PROCEDURE — 97530 THERAPEUTIC ACTIVITIES: CPT | Performed by: PHYSICAL THERAPIST

## 2022-06-06 PROCEDURE — 97110 THERAPEUTIC EXERCISES: CPT | Performed by: PHYSICAL THERAPIST

## 2022-06-06 PROCEDURE — 97112 NEUROMUSCULAR REEDUCATION: CPT | Performed by: PHYSICAL THERAPIST

## 2022-06-06 NOTE — PROGRESS NOTES
[facility]  Physical Therapy Daily Treatment Note  Date: 2022  Patient Name: Vitaliy Murphy  : 1950   MRN: [id number]  Referring Provider: ALISON Burris - CNP  [place],   [facility] Diagnosis: Parkinson's    Outcome Measure: RENE= 52    S: Patient reports feeling good today other than R knee pain. O: Discontinue squats and reinstitute aw marching and hip abd at bar. Time 10:00-11:00 2x/week x 6 weeks (12)  +2x/wk for 4 weeks      Visit  Requesting more visits   Re-Eval done     Pain 7/10 R knee    ROM      Exercise     Nustep L5 x 8 min Seat 11, arms 11   calf raises     Hip abd alt Alt. 2# x 2 min, 1 hand hold    Hip ext     March with AW 2# x 2 min, 1 hand hold    THERAPEUTIC ACTIVITIES Large, functional, dynamic, global movements used to build strength, balance, endurance, and flexibility and to improve physical performance. Step-ups - FWD Alt. 6\" x 2 min    Step-ups - LAT 6\" x 2 min, up & over    Step-ups - BWD     Alt lunges 6\" x 2 min     chops 2 kg x 20 each     overhead lifts/ no squat 2 kg x 15     NMR Feedback and cues necessary for developing neuromuscular control. Movement education and guided movement interventions  used to improve performance and control. To improve balance for safe community and home ambulation   March 4 x 20' with alt arm movements CG for all NMR, especially for turns   Side stepping 4 x 20' step over movement    Retro walk  4 x 20'   Heel to toe 4 x 20'   Navigate around cones         A:  Tolerated well. Pt continues to have difficulty initiating movement when first up but is more steady after first few steps. P: Continue POC. Waiting for approval from physician.   Sherly Ochoa PT    Treatment Charges: Mins Units   Initial Evaluation       Re-Evaluation     Ther Exercise         TE 20 1   Manual Therapy     MT     Ther Activities        TA 25 2   Gait Training          GT     Neuro Re-education NR 15 1   Modalities       Non-Billable Service Time     Other     Total Time/Units 60 4

## 2022-06-10 ENCOUNTER — HOSPITAL ENCOUNTER (OUTPATIENT)
Dept: PHYSICAL THERAPY | Age: 72
Setting detail: THERAPIES SERIES
Discharge: HOME | End: 2022-06-10
Payer: MEDICARE

## 2022-06-10 PROCEDURE — 97530 THERAPEUTIC ACTIVITIES: CPT | Performed by: PHYSICAL THERAPIST

## 2022-06-10 PROCEDURE — 97110 THERAPEUTIC EXERCISES: CPT | Performed by: PHYSICAL THERAPIST

## 2022-06-10 PROCEDURE — 97112 NEUROMUSCULAR REEDUCATION: CPT | Performed by: PHYSICAL THERAPIST

## 2022-06-10 NOTE — PROGRESS NOTES
[facility]  Physical Therapy Daily Treatment Note  Date: 6/10/2022  Patient Name: Milton Rodriguez  : 1950   MRN: [id number]  Referring Provider: ALISON Bautista - CNP  [place]  [place],   [facility] Diagnosis: Parkinson's    Outcome Measure: RENE= 52    S: Patient reports feeling good today other than R knee pain. O: Discontinue squats and reinstitute aw marching and hip abd at bar. Time 10:00-11:00 2x/week x 6 weeks (12)  +2x/wk for 4 weeks      Visit  Requesting more visits   Re-Eval done     Pain 7/10 R knee    ROM      Exercise     Nustep L5 x 8 min Seat 11, arms 11   calf raises     Hip abd alt Alt. 2# x 2 min, 1 hand hold    Hip ext     March with AW 2# x 2 min, 1 hand hold    THERAPEUTIC ACTIVITIES Large, functional, dynamic, global movements used to build strength, balance, endurance, and flexibility and to improve physical performance. Step-ups - FWD Alt. 6\" x 2 min    Step-ups - LAT 6\" x 2 min, up & over    Step-ups - BWD     Alt lunges 6\" x 2 min     chops 2 kg x 20 each     overhead lifts/ no squat 2 kg x 15     NMR Feedback and cues necessary for developing neuromuscular control. Movement education and guided movement interventions  used to improve performance and control. To improve balance for safe community and home ambulation   March 4 x 20' with alt arm movements CG for all NMR, especially for turns   Side stepping 4 x 20' step over movement    Retro walk  4 x 20'   Heel to toe 4 x 20'   Navigate around cones         A:  Tolerated well. Pt continues to have difficulty initiating movement when first up but is more steady after first few steps. P: Continue POC. Waiting for approval from physician.   Beni Bell PT    Treatment Charges: Mins Units   Initial Evaluation       Re-Evaluation     Ther Exercise         TE 20 1   Manual Therapy     MT     Ther Activities        TA 25 2   Gait Training          GT     Neuro Re-education NR 15 1   Modalities       Non-Billable Service Time     Other     Total Time/Units 60 4

## 2022-06-13 ENCOUNTER — HOSPITAL ENCOUNTER (OUTPATIENT)
Dept: PHYSICAL THERAPY | Age: 72
Setting detail: THERAPIES SERIES
Discharge: HOME | End: 2022-06-13
Payer: MEDICARE

## 2022-06-13 PROCEDURE — 97110 THERAPEUTIC EXERCISES: CPT | Performed by: PHYSICAL THERAPIST

## 2022-06-13 PROCEDURE — 97112 NEUROMUSCULAR REEDUCATION: CPT | Performed by: PHYSICAL THERAPIST

## 2022-06-13 PROCEDURE — 97530 THERAPEUTIC ACTIVITIES: CPT | Performed by: PHYSICAL THERAPIST

## 2022-06-13 NOTE — PROGRESS NOTES
[facility]  Physical Therapy Daily Treatment Note  Date: 2022  Patient Name: Kayla Soto  : 1950   MRN: [id number]  Referring Provider: ALISON Sales - CNP  [facility]  [place],   [facility] Diagnosis: Parkinson's    Outcome Measure: RENE= 52    S: Patient reports feeling good today other than R knee pain. O: Discontinue squats and reinstitute aw marching and hip abd at bar. Time 10:00-11:00 2x/week x 6 weeks (12)  +2x/wk for 4 weeks      Visit  Requesting more visits  3/8 Re-Eval done     Pain 7/10 R knee    ROM      Exercise     Nustep L5 x 8 min Seat 11, arms 11   calf raises     Hip abd alt Alt. 2# x 2 min, 1 hand hold    Hip ext     March with AW 2# x 2 min, 1 hand hold    THERAPEUTIC ACTIVITIES Large, functional, dynamic, global movements used to build strength, balance, endurance, and flexibility and to improve physical performance. Step-ups - FWD Alt. 6\" x 2 min    Step-ups - LAT 6\" x 2 min, up & over    Step-ups - BWD     Alt lunges 6\" x 2 min     chops 2 kg x 20 each     overhead lifts/ no squat 2 kg x 15     NMR Feedback and cues necessary for developing neuromuscular control. Movement education and guided movement interventions  used to improve performance and control. To improve balance for safe community and home ambulation   March 4 x 20' with alt arm movements CG for all NMR, especially for turns   Side stepping 4 x 20' step over movement    Retro walk  4 x 20'   Heel to toe 4 x 20'   Navigate around cones         A:  Tolerated well. Pt continues to have difficulty initiating movement when first up but is more steady after first few steps. P: Continue POC. Waiting for approval from physician.   Royal Schroeder PT    Treatment Charges: Mins Units   Initial Evaluation       Re-Evaluation     Ther Exercise         TE 20 1   Manual Therapy     MT     Ther Activities        TA 25 2   Gait Training          GT     Neuro Re-education NR 15 1   Modalities       Non-Billable Service Time     Other     Total Time/Units 60 4

## 2022-06-17 ENCOUNTER — HOSPITAL ENCOUNTER (OUTPATIENT)
Dept: PHYSICAL THERAPY | Age: 72
Setting detail: THERAPIES SERIES
Discharge: HOME | End: 2022-06-17
Payer: MEDICARE

## 2022-06-17 PROCEDURE — 97530 THERAPEUTIC ACTIVITIES: CPT | Performed by: PHYSICAL THERAPIST

## 2022-06-17 PROCEDURE — 97110 THERAPEUTIC EXERCISES: CPT | Performed by: PHYSICAL THERAPIST

## 2022-06-17 PROCEDURE — 97112 NEUROMUSCULAR REEDUCATION: CPT | Performed by: PHYSICAL THERAPIST

## 2022-06-17 NOTE — PROGRESS NOTES
[facility]  Physical Therapy Daily Treatment Note  Date: 2022  Patient Name: Carole Bacon  : 1950   MRN: [id number]  Referring Provider: ALISON Cervantes - CNP  [place]  [place],   [facility] Diagnosis: Parkinson's    Outcome Measure: RENE= 52    S: Patient reports feeling good today other than R knee pain. O:    Time 10:00-11:00 2x/week x 6 weeks (12)  +2x/wk for 4 weeks      Visit  Requesting more visits   Re-Eval done     Pain 7/10 R knee    ROM      Exercise     Nustep L5 x 8 min Seat 11, arms 11   calf raises     Hip abd alt Alt. 2# x 2 min, 1 hand hold    Hip ext     March with AW 2# x 2 min, 1 hand hold    THERAPEUTIC ACTIVITIES Large, functional, dynamic, global movements used to build strength, balance, endurance, and flexibility and to improve physical performance. Step-ups - FWD Alt. 6\" x 2 min    Step-ups - LAT 6\" x 2 min, up & over    Step-ups - BWD     Alt lunges 6\" x 2 min     chops 2 kg x 20 each     overhead lifts/ no squat 2 kg x 15     NMR Feedback and cues necessary for developing neuromuscular control. Movement education and guided movement interventions  used to improve performance and control. To improve balance for safe community and home ambulation   March 4 x 20' with alt arm movements CG for all NMR, especially for turns   Side stepping 4 x 20' step over movement    Retro walk  4 x 20'   Heel to toe 4 x 20'   Navigate around cones         A:  Tolerated well. Initiating of steps somewhat better today. P: Continue POC.     Alpha Beverage, PT    Treatment Charges: Mins Units   Initial Evaluation       Re-Evaluation     Ther Exercise         TE 20 1   Manual Therapy     MT     Ther Activities        TA 25 2   Gait Training          GT     Neuro Re-education NR 15 1   Modalities       Non-Billable Service Time     Other     Total Time/Units 60 4

## 2022-06-20 ENCOUNTER — HOSPITAL ENCOUNTER (OUTPATIENT)
Dept: PHYSICAL THERAPY | Age: 72
Setting detail: THERAPIES SERIES
Discharge: HOME | End: 2022-06-20
Payer: MEDICARE

## 2022-06-20 PROCEDURE — 97530 THERAPEUTIC ACTIVITIES: CPT | Performed by: PHYSICAL THERAPIST

## 2022-06-20 PROCEDURE — 97110 THERAPEUTIC EXERCISES: CPT | Performed by: PHYSICAL THERAPIST

## 2022-06-20 NOTE — PROGRESS NOTES
[facility]  Physical Therapy Daily Treatment Note  Date: 2022  Patient Name: Inna Jacobson  : 1950   MRN: [id number]  Referring Provider: ALISON Partida - CNP  [place]  [place],   [facility] Diagnosis: Parkinson's    Outcome Measure: RENE= 52    S: Patient reports feeling pretty good today other than R knee pain. O:    Time 8557-7939 2x/week x 6 weeks (12)  +2x/wk for 4 weeks      Visit  Requesting more visits   Re-Eval done     Pain 6/10 R knee    ROM      Exercise     Nustep L5 x 8 min Seat 11, arms 11   calf raises     Hip abd alt Alt. 2# x 2 min, 1 hand hold    Hip ext     March with AW 2# x 2 min, 1 hand hold    THERAPEUTIC ACTIVITIES Large, functional, dynamic, global movements used to build strength, balance, endurance, and flexibility and to improve physical performance. Step-ups - FWD Alt. 6\" x 2 min    Step-ups - LAT 6\" x 2 min, up & over    Step-ups - BWD     Alt lunges 6\" x 2 min     chops 2 kg x 20 each     overhead lifts/ no squat 2 kg x 15     NMR Feedback and cues necessary for developing neuromuscular control. Movement education and guided movement interventions  used to improve performance and control. To improve balance for safe community and home ambulation   March 4 x 20' with alt arm movements CG for all NMR, especially for turns   Side stepping 4 x 20' step over movement    Retro walk  4 x 20'   Heel to toe 4 x 20'   Navigate around cones         A:  Tolerated well. Pt generally steady with straight walking with mild instability with turns during todays session. P: Continue POC.     Pooja Javed, PT    Treatment Charges: Mins Units   Initial Evaluation       Re-Evaluation     Ther Exercise         TE 20 1   Manual Therapy     MT     Ther Activities        TA 25 2   Gait Training          GT     Neuro Re-education NR     Modalities       Non-Billable Service Time     Other     Total Time/Units 45 3

## 2022-06-27 ENCOUNTER — HOSPITAL ENCOUNTER (OUTPATIENT)
Dept: PHYSICAL THERAPY | Age: 72
Setting detail: THERAPIES SERIES
Discharge: HOME | End: 2022-06-27
Payer: MEDICARE

## 2022-06-27 PROCEDURE — 97530 THERAPEUTIC ACTIVITIES: CPT | Performed by: PHYSICAL THERAPIST

## 2022-06-27 PROCEDURE — 97110 THERAPEUTIC EXERCISES: CPT | Performed by: PHYSICAL THERAPIST

## 2022-06-27 NOTE — PROGRESS NOTES
[facility]  Physical Therapy Daily Treatment Note  Date: 2022  Patient Name: Jelani Wallace  : 1950   MRN: [id number]  Referring Provider: ALISON Lock - CNP  [place]  [place],   [facility] Diagnosis: Parkinson's    Outcome Measure: RENE= 52    S: Patient reports feeling pretty good today other than R knee pain. O:    Time 1010-10:55 2x/week x 6 weeks (12)  +2x/wk for 4 weeks      Visit  Requesting more visits   Re-Eval done     Pain 6/10 R knee    ROM      Exercise     Nustep L5 x 8 min Seat 11, arms 11   calf raises     Hip abd alt Alt. 2# x 2 min, 1 hand hold    Hip ext     March with AW 2# x 2 min, 1 hand hold    THERAPEUTIC ACTIVITIES Large, functional, dynamic, global movements used to build strength, balance, endurance, and flexibility and to improve physical performance. Step-ups - FWD Alt. 6\" x 2 min    Step-ups - LAT 6\" x 2 min, up & over    Step-ups - BWD     Alt lunges 6\" x 2 min     chops 2 kg x 20 each     overhead lifts/ no squat 2 kg x 15     NMR Feedback and cues necessary for developing neuromuscular control. Movement education and guided movement interventions  used to improve performance and control. To improve balance for safe community and home ambulation   March 4 x 20' with alt arm movements CG for all NMR, especially for turns   Side stepping 4 x 20' step over movement    Retro walk  4 x 20'   Heel to toe 4 x 20'   Navigate around cones         A:  Tolerated well. Pt generally steady with straight walking with mild instability with turns during todays session. P: Continue POC.     Jailene Meyer PT    Treatment Charges: Mins Units   Initial Evaluation       Re-Evaluation     Ther Exercise         TE 20 1   Manual Therapy     MT     Ther Activities        TA 25 2   Gait Training          GT     Neuro Re-education NR     Modalities       Non-Billable Service Time     Other     Total Time/Units 45 3

## 2022-07-01 ENCOUNTER — HOSPITAL ENCOUNTER (OUTPATIENT)
Dept: PHYSICAL THERAPY | Age: 72
Setting detail: THERAPIES SERIES
Discharge: HOME | End: 2022-07-01
Payer: MEDICARE

## 2022-07-01 PROCEDURE — 97110 THERAPEUTIC EXERCISES: CPT | Performed by: PHYSICAL THERAPIST

## 2022-07-01 PROCEDURE — 97530 THERAPEUTIC ACTIVITIES: CPT | Performed by: PHYSICAL THERAPIST

## 2022-07-01 NOTE — PROGRESS NOTES
[facility]  Physical Therapy Daily Treatment Note  Date: 2022  Patient Name: Lizbet Arzola  : 1950   MRN: [id number]  Referring Provider: ALISON Brown - [place]  [place],   [facility] Diagnosis: Parkinson's    Outcome Measure: RENE= 52    S: Patient reports feeling pretty good today other than R knee pain. O:    Time 1010-10:55 2x/week x 6 weeks (12)  +2x/wk for 4 weeks      Visit  Requesting more visits   Re-Eval done     Pain 6/10 R knee    ROM      Exercise     Nustep L5 x 8 min Seat 11, arms 11   calf raises     Hip abd alt Alt. 2# x 2 min, 1 hand hold    Hip ext     March with AW 2# x 2 min, 1 hand hold    THERAPEUTIC ACTIVITIES Large, functional, dynamic, global movements used to build strength, balance, endurance, and flexibility and to improve physical performance. Step-ups - FWD Alt. 6\" x 2 min    Step-ups - LAT 6\" x 2 min, up & over    Step-ups - BWD     Alt lunges 6\" x 2 min     chops 2 kg x 20 each     overhead lifts/ no squat 2 kg x 15     NMR Feedback and cues necessary for developing neuromuscular control. Movement education and guided movement interventions  used to improve performance and control. To improve balance for safe community and home ambulation   March 4 x 20' with alt arm movements CG for all NMR, especially for turns   Side stepping 4 x 20' step over movement    Retro walk  4 x 20'   Heel to toe 4 x 20'   Navigate around cones         A:  Tolerated well. Pt generally steady with straight walking with mild instability with turns during todays session. P: Continue POC.     Pearly Sicard, PT    Treatment Charges: Mins Units   Initial Evaluation       Re-Evaluation     Ther Exercise         TE 20 1   Manual Therapy     MT     Ther Activities        TA 25 2   Gait Training          GT     Neuro Re-education NR     Modalities       Non-Billable Service Time     Other     Total Time/Units 45 3

## 2022-07-06 NOTE — PROGRESS NOTES
PT DISCHARGE REPORT      PATIENT: Guru Carcamo   Date: 7/6/2022  DIAGNOSIS:  Parkinson's  PHYSICIAN:  Theresa Santillan, APRN - JADA  [facility]  [place],  [address] [facility]     ATTENDANCE:  20  OUT OF 20  APPOINTMENT FROM  4/18/2020 TO 07/07/22  TREATMENTS RECEIVED:  THEREX, GAIT, BALANCE, HEP,      INITIAL PROBLEM 5/09/2022 5/23/2022 CURRENT STATUS                        DECREASED STRENGTH LLE       HIP              Flex= 4/5  Knee    flex = 4/5    / =  4/5  Ankle    pf=      4/5    df=     4/5               HIP              Flex= 4+/5  Knee    flex = 4+/5    / =  4+/5  Ankle    pf=      4+/5    df=     4+/5         HIP              Flex= 5-/5  Knee    flex = 5-/5    / =  5-/5  Ankle    pf=      5-/5    df= 5-/5         HIP              Flex= 5-/5  Knee    flex = 5-/5    / =  5/5  Ankle    pf=      5/5    df= 5/5         DECREASED STRENGTH RLE        HIP              Flex= 4/5  Knee    flex = 4/5    / =  4/5  Ankle    pf=      4/5    df=     4/5           HIP              Flex= 4+/5  Knee    flex = 4+/5    / =  4+/5  Ankle    pf=      4+/5    df=     4+/5         HIP              Flex=5-/5  Knee    flex = 5-/5    / =  5-/5  Ankle    pf=     5-/5    df=     5/5         HIP              Flex=5-/5  Knee    flex = 5-/5    / =  5/5  Ankle    pf=     5/5    df=     5/5   ABNORMAL GAIT:      Decreased hip flexion      Decreased knee flexion      Decreased arm swing      Wide LANDON with STC Improving Continues to improve Improved. Initiation of steps still a challenge at times. COMMENTS/ RECOMMENDATIONS: Pt progressed well with LE  strength. Gait has improved with reminder to think big with hip flexion during gait. Turns and pivots continue to be a challenge at time. Pt is discharged from PT at this time.     [address] YOU FOR THE OPPORTUNITY TO WORK WITH WITH THIS PATIENT  ABIMBOLA PERRY PT            IF YOU HAVE ANY QUESTIONS OR COMMENTS, PLEASE FEEL FREE TO CONTACT ME AT   [address] [facility]  [address] [facility] [place] [address]  FAX : [phone]  PHONE: [phone]

## 2022-07-07 ENCOUNTER — HOSPITAL ENCOUNTER (OUTPATIENT)
Dept: PHYSICAL THERAPY | Age: 72
Setting detail: THERAPIES SERIES
Discharge: HOME | End: 2022-07-07
Payer: MEDICARE

## 2022-07-07 PROCEDURE — 97530 THERAPEUTIC ACTIVITIES: CPT | Performed by: PHYSICAL THERAPIST

## 2022-07-07 PROCEDURE — 97110 THERAPEUTIC EXERCISES: CPT | Performed by: PHYSICAL THERAPIST

## 2022-07-07 NOTE — PROGRESS NOTES
[facility]  Physical Therapy Daily Treatment Note  Date: 2022  Patient Name: Bianka Joy  : 1950   MRN: [id number]  Referring Provider: ALISON Howe - CNP  [place]  [place],   [facility] Diagnosis: Parkinson's    Outcome Measure: RENE= 52    S: Patient reports feeling pretty good today other than R knee pain continues  O:    Time 09:20-10:10 2x/week x 6 weeks (12)  +2x/wk for 4 weeks      Visit  Requesting more visits   Re-Eval done     Pain 6/10 R knee    ROM      Exercise     Nustep L5 x 8 min Seat 11, arms 11   calf raises     Hip abd alt Alt. 2# x 2 min, 1 hand hold    Hip ext     March with AW 2# x 2 min, 1 hand hold    THERAPEUTIC ACTIVITIES Large, functional, dynamic, global movements used to build strength, balance, endurance, and flexibility and to improve physical performance. Step-ups - FWD Alt. 6\" x 2 min    Step-ups - LAT 6\" x 2 min, up & over    Step-ups - BWD     Alt lunges 6\" x 2 min     chops 2 kg x 20 each     overhead lifts/ no squat 2 kg x 15     NMR Feedback and cues necessary for developing neuromuscular control. Movement education and guided movement interventions  used to improve performance and control. To improve balance for safe community and home ambulation   March 4 x 20' with alt arm movements CG for all NMR, especially for turns   Side stepping 4 x 20' step over movement    Retro walk  4 x 20'   Heel to toe 4 x 20'   Navigate around cones         A:  Tolerated well. See discharge note. P: Discharge from PT.   Jose Alfredo Shipman PT    Treatment Charges: Mins Units   Initial Evaluation       Re-Evaluation     Ther Exercise         TE 25 1   Manual Therapy     MT     Ther Activities        TA 25 2   Gait Training          GT     Neuro Re-education NR     Modalities       Non-Billable Service Time     Other     Total Time/Units 45 3

## 2022-09-07 ENCOUNTER — OFFICE VISIT (OUTPATIENT)
Dept: NEUROLOGY | Age: 72
End: 2022-09-07
Payer: MEDICARE

## 2022-09-07 VITALS
BODY MASS INDEX: 22.62 KG/M2 | SYSTOLIC BLOOD PRESSURE: 110 MMHG | DIASTOLIC BLOOD PRESSURE: 81 MMHG | HEART RATE: 69 BPM | WEIGHT: 158 LBS | TEMPERATURE: 96.9 F | HEIGHT: 70 IN | OXYGEN SATURATION: 95 %

## 2022-09-07 DIAGNOSIS — G20 PARKINSON'S DISEASE (HCC): Primary | ICD-10-CM

## 2022-09-07 PROCEDURE — 99214 OFFICE O/P EST MOD 30 MIN: CPT | Performed by: NURSE PRACTITIONER

## 2022-09-07 PROCEDURE — 1123F ACP DISCUSS/DSCN MKR DOCD: CPT | Performed by: NURSE PRACTITIONER

## 2022-09-07 RX ORDER — ROPINIROLE 0.5 MG/1
0.5 TABLET, FILM COATED ORAL 2 TIMES DAILY
Qty: 180 TABLET | Refills: 3 | Status: SHIPPED | OUTPATIENT
Start: 2022-09-07

## 2022-09-07 NOTE — PROGRESS NOTES
[address] [facility] MSN, APRN-CNP, [address], [address] [place] [address]      [phone]                                     Virgen Brown is a 70 y.o. right handed male     We are following him for Parkinson's    He presents with his wife and both are good historians. He completed a round of physical therapy and did find it beneficial, but he is not continuing independent exercise at home. Tremors and movements overall are fairly well controlled on his current dose of Sinemet every 4 hours while awake, but he he reports gait freezing with initiation and turns. No falls. No difficulty swallowing. No memory changes or hallucinations. No lightheadedness. No chest pain or palpitations  No SOB  No vertigo, lightheadedness or loss of consciousness  No falls, tripping or stumbling  No incontinence of bowels or bladder  No itching or bruising appreciated  No numbness, tingling or focal arm/leg weakness  No speech problems    ROS otherwise negative     Medications:     Current Outpatient Medications on File Prior to Visit   Medication Sig Dispense Refill    carbidopa-levodopa (SINEMET)  MG per tablet Take 2 tablets by mouth every 4 hours (while awake) 720 tablet 3    warfarin (COUMADIN) 5 MG tablet 75 mg on Sunday and 5 mg Monday through Saturday      Verapamil HCl  MG CP24 Take by mouth       No current facility-administered medications on file prior to visit.      Objective:     /81   Pulse 69   Temp 96.9 °F (36.1 °C)   Ht 5' 10\" (1.778 m)   Wt 158 lb (71.7 kg)   SpO2 95%   BMI 22.67 kg/m²     General appearance: alert, appears stated age, cooperative and in no distress--hypomimia  Head: normocephalic, without obvious abnormality, atraumatic  Eyes: conjunctivae/corneas clear; no drainage  Neck: Full range of motion with no cogwheeling  Lungs: clear to auscultation bilaterally  Heart: irreg irregular rate and rhythm  Extremities: normal, atraumatic, no cyanosis or edema  Pulses: 2+ and symmetric  Skin:  color, texture, turgor normal--no rashes or lesions    Mental Status: alert and oriented x 4--pleasant    Appropriate attention/concentration  Intact fundus of knowledge  Memories intact    Speech: no dysarthria; hypophonic  Language: no aphasias    Cranial Nerves:  I: smell    II: visual acuity     II: visual fields Full    II: pupils LISA   III,VII: ptosis None    Minimal blinking   III,IV,VI: extraocular muscles  EOMI without nystagmus   V: mastication Normal   V: facial light touch sensation  Normal   V,VII: corneal reflex     VII: facial muscle function - upper  Normal   VII: facial muscle function - lower Normal   VIII: hearing Normal   IX: soft palate elevation  Normal   IX,X: gag reflex    XI: trapezius strength  5/5   XI: sternocleidomastoid strength 5/5   XI: neck extension strength  5/5   XII: tongue strength  Normal     No Myerson's    Motor:  5/5 throughout  Decreased bulk  No cogwheeling  No resting tremors at the present time  Mild bradykinesia    Sensory:  LT normal    Coordination:   Hyperkinesis with FFM and TYRESE R hand; no ataxia    Gait:  Gait freezing of right foot upon initiation and with turns  No shuffling or festination  Slightly stooped    DTR:   Right Brachioradialis reflex 1+  Left Brachioradialis reflex 1+  Right Biceps reflex 1+  Left Biceps reflex 1+  Right Quadriceps reflex 2+  Left Quadriceps reflex 2+  Right Achilles reflex 1+  Left Achilles reflex 1+    No Saavedra's    No other pathological reflexes    Laboratory/Radiology:  ry/Radiology:      No current labs or images to review    Assessment:     Parkinson's disease: Some gait freezing on initiation and with turns that may respond to addition of a dopamine agonist, otherwise his motor symptoms are fairly well controlled on his current Sinemet dose. Unfortunately, his sedentary lifestyle will contribute to faster progression of his disease.   No significant nonmotor symptoms.     Alcoholism: admits to one beer weekly    A. fib: On [address]    Plan:     -Start Requip 0.5 mg BID  -Continue Sinemet 50/200 mg every 4 hours WA  -Strongly encouraged routine physical exercise program  -Routine labs next visit if not already done  -Recommend avoidance of alcohol    RTO in 4-6 mos or or sooner ALISON Khan - CNP  11:43 AM  9/7/2022

## 2023-03-06 ENCOUNTER — OFFICE VISIT (OUTPATIENT)
Dept: NEUROLOGY | Age: 73
End: 2023-03-06
Payer: MEDICARE

## 2023-03-06 VITALS
OXYGEN SATURATION: 94 % | SYSTOLIC BLOOD PRESSURE: 124 MMHG | WEIGHT: 169.6 LBS | DIASTOLIC BLOOD PRESSURE: 80 MMHG | TEMPERATURE: 96.6 F | RESPIRATION RATE: 16 BRPM | HEART RATE: 68 BPM | HEIGHT: 70 IN | BODY MASS INDEX: 24.28 KG/M2

## 2023-03-06 DIAGNOSIS — G20 PARKINSON'S DISEASE (HCC): Primary | ICD-10-CM

## 2023-03-06 PROCEDURE — 99214 OFFICE O/P EST MOD 30 MIN: CPT | Performed by: NURSE PRACTITIONER

## 2023-03-06 PROCEDURE — G8420 CALC BMI NORM PARAMETERS: HCPCS | Performed by: NURSE PRACTITIONER

## 2023-03-06 PROCEDURE — G8484 FLU IMMUNIZE NO ADMIN: HCPCS | Performed by: NURSE PRACTITIONER

## 2023-03-06 PROCEDURE — 3017F COLORECTAL CA SCREEN DOC REV: CPT | Performed by: NURSE PRACTITIONER

## 2023-03-06 PROCEDURE — 4004F PT TOBACCO SCREEN RCVD TLK: CPT | Performed by: NURSE PRACTITIONER

## 2023-03-06 PROCEDURE — G8427 DOCREV CUR MEDS BY ELIG CLIN: HCPCS | Performed by: NURSE PRACTITIONER

## 2023-03-06 PROCEDURE — 1123F ACP DISCUSS/DSCN MKR DOCD: CPT | Performed by: NURSE PRACTITIONER

## 2023-03-06 RX ORDER — ACETAMINOPHEN 160 MG
1 TABLET,DISINTEGRATING ORAL DAILY
COMMUNITY

## 2023-03-06 RX ORDER — VITAMIN B COMPLEX
1000 TABLET ORAL DAILY
COMMUNITY

## 2023-03-06 RX ORDER — ROPINIROLE 0.5 MG/1
0.5 TABLET, FILM COATED ORAL 3 TIMES DAILY
Qty: 270 TABLET | Refills: 3 | Status: SHIPPED | OUTPATIENT
Start: 2023-03-06

## 2023-03-06 NOTE — PROGRESS NOTES
[address] [facility] MSN, APRN-CNP, [address], [address] [place] [address]      [phone]                                    Office Follow Up     Joleen Monteiro is a 67 y.o. right handed male     We are following him for Parkinson's    He presents alone and is a good historian. He has not noticed a difference in his gait freezing since starting Requip BID--right foot continues to get stuck only on gait initiation. He remains on Sinemet 50/200 every 4 hours while awake. Tremors are controlled. He walks with a cane and has suffered no falls. He is trying to walk independently but is agreeable to more physical therapy if recommended. No lightheadedness, dysphagia, memory decline, or hallucinations. Sleeping well. No excessive alcohol use. No chest pain or palpitations  No SOB  No vertigo, lightheadedness or loss of consciousness  No falls, tripping or stumbling  No incontinence of bowels or bladder  No itching or bruising appreciated  No numbness, tingling or focal arm/leg weakness    ROS otherwise negative     Medications:     Current Outpatient Medications on File Prior to Visit   Medication Sig Dispense Refill    Vitamin D (CHOLECALCIFEROL) 25 MCG (1000 UT) TABS tablet Take 1,000 Units by mouth daily      Cholecalciferol (VITAMIN D3) 50 MCG (2000 UT) CAPS Take 1 capsule by mouth daily      rOPINIRole (REQUIP) 0.5 MG tablet Take 1 tablet by mouth 2 times daily 180 tablet 3    carbidopa-levodopa (SINEMET)  MG per tablet Take 2 tablets by mouth every 4 hours (while awake) 720 tablet 3    warfarin (COUMADIN) 5 MG tablet 2.5 mg on Sunday and 5 mg Monday through Saturday      Verapamil HCl  MG CP24 Take 300 mg by mouth daily       No current facility-administered medications on file prior to visit.      Objective:     /80   Pulse 68   Temp (!) 96.6 °F (35.9 °C)   Resp 16   Ht 5' 10\" (1.778 m)   Wt 169 lb 9.6 oz (76.9 kg)   SpO2 94%   BMI 24.34 kg/m²     General appearance: alert, appears stated age, cooperative and in no distress--hypomimia  Head: normocephalic, without obvious abnormality, atraumatic  Eyes: conjunctivae/corneas clear; no drainage  Neck: Full range of motion with no cogwheeling  Lungs: clear to auscultation bilaterally  Heart: irreg irregular rate and rhythm  Extremities: normal, atraumatic, no cyanosis or edema  Pulses: 2+ and symmetric  Skin:  color, texture, turgor normal--no rashes or lesions    Mental Status: alert and oriented x 4--pleasant    Appropriate attention/concentration  Intact fundus of knowledge  Memories intact    Speech: no dysarthria; hypophonic  Language: no aphasias    Cranial Nerves:  I: smell    II: visual acuity     II: visual fields Full    II: pupils LISA   III,VII: ptosis None    Minimal blinking   III,IV,VI: extraocular muscles  EOMI without nystagmus   V: mastication Normal   V: facial light touch sensation  Normal   V,VII: corneal reflex     VII: facial muscle function - upper  Normal   VII: facial muscle function - lower Normal   VIII: hearing Normal   IX: soft palate elevation  Normal   IX,X: gag reflex    XI: trapezius strength  5/5   XI: sternocleidomastoid strength 5/5   XI: neck extension strength  5/5   XII: tongue strength  Normal     No Myerson's    Motor:  5/5 throughout  Decreased bulk  No cogwheeling; increased tone R wrist  No resting tremors at the present time  Mild bradykinesia    Sensory:  LT normal    Coordination:   Hyperkinesis with FFM and TYRESE R hand; no ataxia    Gait:  Gait freezing of right foot upon initiation and with turns  No shuffling or festination  Slightly stooped    DTR:   Right Brachioradialis reflex 1+  Left Brachioradialis reflex 1+  Right Biceps reflex 1+  Left Biceps reflex 1+  Right Quadriceps reflex 2+  Left Quadriceps reflex 2+  Right Achilles reflex 1+  Left Achilles reflex 1+    No Saavedra's    No other pathological reflexes    Laboratory/Radiology: No current labs or images to review    Assessment:     Idiopathic Parkinson's disease: Still having bothersome gait freezing which may respond to higher doses of ropinirole atop his Sinemet. Increasing physical activity may help delay the progression of his disease. Thankfully, he has no significant nonmotor symptoms at present    Alcoholism: Currently denies any recurrence    A. fib:  On warfarin    Plan:     -Increase Requip 0.5 mg TID  -Continue Sinemet 50/200 mg every 4 hours WA  -PT at [facility]   -Strongly encouraged routine physical exercise program  -Recommend avoidance of alcohol    RTO in 6 mos or or sooner PRN    ALISON Guerin - CNP  3:22 PM  3/6/2023

## 2023-03-10 NOTE — PROGRESS NOTES
[address] [facility] OUTPATIENT REHABILITATION  PHYSICAL THERAPY INITIAL EVALUATION         Date:  3/13/2023   Patient: Kenneth Oden  : 1950  MRN: [id number]  Referring Provider: ALISON Mcneal CNP  [address],   [facility]     Medical Diagnosis: Parkinson's Disease  Onset date: 12 months   Mechanism of Injury: Insidious onset  Chief complaint: Difficulty initiating gait, balance issues      SUBJECTIVE:     Past Medical History  Past Medical History:   Diagnosis Date    A-fib ([facility] [address] [address]     Alcoholism ([facility] [address]     HTN (hypertension)     Parkinson's disease ([facility] [address]      Past Surgical History:   Procedure Laterality Date    INGUINAL HERNIA REPAIR Left     as a baby    KNEE SURGERY Right        Medications:   Current Outpatient Medications   Medication Sig Dispense Refill    Vitamin D (CHOLECALCIFEROL) 25 MCG (1000 UT) TABS tablet Take 1,000 Units by mouth daily      Cholecalciferol (VITAMIN D3) 50 MCG (2000 UT) CAPS Take 1 capsule by mouth daily      rOPINIRole (REQUIP) 0.5 MG tablet Take 1 tablet by mouth 3 times daily 270 tablet 3    carbidopa-levodopa (SINEMET)  MG per tablet Take 2 tablets by mouth every 4 hours (while awake) 720 tablet 3    warfarin (COUMADIN) 5 MG tablet 2.5 mg on  and 5 mg Monday through Saturday      Verapamil HCl  MG CP24 Take 300 mg by mouth daily       No current facility-administered medications for this encounter. Imaging results: MRI BRAIN WO CONTRAST    Result Date: 2021  EXAMINATION: MRI OF THE BRAIN WITHOUT CONTRAST  2021 8:31 am TECHNIQUE: Multiplanar multisequence MRI of the brain was performed without the administration of intravenous contrast. COMPARISON: None. HISTORY: ORDERING SYSTEM PROVIDED HISTORY: Parkinson's disease [facility]) TECHNOLOGIST PROVIDED HISTORY: Reason for exam:->parkinson's disease FINDINGS: INTRACRANIAL STRUCTURES/VENTRICLES: There is no acute infarct. No mass effect, edema or hemorrhage is seen. Mild volume loss is seen in the cerebrum with mild chronic microvascular ischemic changes. Both are in the range that is typical for age. The skull base arterial flow voids are intact. No hydrocephalus or extra-axial fluid is seen. ORBITS: The visualized portion of the orbits demonstrate no acute abnormality. SINUSES:  A mucous retention cyst is seen in the right maxillary sinus. Mild scattered mucosal thickening in the paranasal sinuses. Small to moderate volume bilateral mastoid effusions. BONES/SOFT TISSUES: The bone marrow signal intensity appears normal. The soft tissues demonstrate no acute abnormality. 1. No acute intracranial abnormality. 2. No mass effect, edema or hemorrhage. 3. Bilateral mastoid effusions, which may be due to eustachian tube dysfunction or otomastoiditis. Clinical correlation is needed. Pain:    Current: 0/10          Behavior: condition is staying the same.     Occupation: Retired    Exercise regimen: none    Hobbies: fishing , hunting     Patient Goals: Walk better , get stronger  Medical Management for Current Problem:  [] Chiro  []  CT:  []  Injection:   [x]  Meds:   []  MRI:  []  Ortho  []  PCP  []  PT/OT  []  X-ray:  []  Surgery:  []  Other:     Precautions/Contraindications:     OBJECTIVE:     Inspection:  Standing  Knees:  [x] Normal  [] Genu Valgus [] Genu Varus  [] Genu Recurvatum    Tibia:  [x] Normal  [] Tibial Varus  [] Tibial Varum    Feet:  [x] Normal  [] Calcaneal Valgus   [] Calcaneal Varus   [] Hallux Valgus    [] Supination  [] Pronation          [] Pes Planus    [] Pes Cavus            Gait:  Decreased arm swing, wide LANDON with STC    Joint/Motion:    Hip:        Right:            WNL        Left:          WNL    Knee:      Right:           WNL      Left:          WNL  Ankle:       Right:           WNL       Left:          WNL  Strength:        Hip:              Right: Flexion 4/5,                 Left: Flexion 4/5,     Knee:         Right: Flexion 4/5, Extension 4/5        Left: Flexion 4/5,  Extension 4/5    Ankle:          Right: Dorsiflexion 4/5, Plantarflexion ,4/5,                Left: Dorsiflexion 4/5, Plantarflexion     4/5,       ASSESSMENT      Patient's main problems LE weakness, balance issues and difficulty initiating gait. Problems:    Decreased Strength Quique hip flexion  Abnormal gait  Decreased balance  Decreased functional ability with walking, standing, lifting, pushing, pulling, stair climbing    [x] There are no barriers affecting plan of care or recovery    [] Barriers to this patient's plan of care or recovery include. Domestic Concerns:  [x] No  [] Yes:      Short Term goals (3 weeks)    Increase Strength by 1/3 mm grade   Independent with Home Exercise Programs    Long Term goals (6 weeks)  Increase Strength to 5/5 quique hip flexion   Normal gait  Normal balance  Able to perform/complete the following functions/tasks: walking, standing, lifting, pushing, pulling, stair climbing    Outcome Measure: RENE= 47    Rehab Potential: [x] Good  [] Fair  [] Poor    PLAN   Specific Provider Orders: Eval and treat    Physical therapy plan of care is established based on physician order, patient diagnosis and clinical assessment. Treatment Plan:  [x] Therapeutic Exercise  [x] Therapeutic Activity  [x] Neuromuscular Re-education   [x] Gait Training  [x] Balance Training  [] Aerobic conditioning  [] Manual Therapy  [] Massage/Fascial release   [] Work/Sport specific activities    [] Pain Neuroscience [] Cold/hotpack  [] Vasocompression  [] Electrical Stimulation  [] Lumbar/Cervical Traction  [] Ultrasound   [] Iontophoresis: 4 mg/mL Dexamethasone Sodium Phosphate 40-80 mAmin        [x] Instruction in HEP      []  Medication allergies reviewed for use of Dexamethasone Sodium Phosphate 4mg/ml  with iontophoresis treatments. Patient is not allergic.     Suggested Professional Referral: [x] No  [] Yes:     The following CPT codes are likely to be used in the care of this patient: 71501 PT Evaluation: Moderate Complexity , 90220 Therapeutic Exercise , 42093 Neuromuscular Re-Education , 14053 Therapeutic Activities , 08941 Gait Training     Patient Education:  [x] Plans/Goals, Risks/Benefits discussed  [x] Home exercise program  Method of Education: [x] Verbal  [x] Demo  [x] Written  Comprehension of Education:  [x] Verbalizes understanding. [x] Demonstrates understanding. [] Needs Review. [] Demonstrates/verbalizes understanding of HEP/Ed previously given. Frequency:   2 times per week for 6 weeks    Patient understands diagnosis/prognosis and consents to treatment, plan and goals: [x] Yes    [] No     Thank you for the opportunity to work with your patient. If you have questions or comments, please contact me at [phone]; fax: [phone]. Electronically signed by: Shila Mahan, PT         Please sign Physician's Certification and return to:   [address] [place], [place] [id number]  Dept: [phone]  Dept Fax: [phone]: [phone] Certification / Comments     Frequency/Duration  2  times per week for  6 weeks. Certification period From: 3/13/2023  To:  6/13/2023    I have reviewed the Plan of Care established for skilled therapy services and certify that the services are required and that they will be provided while the patient is under my care.     Physician's Comments/Revisions:               Physician's Printed Name:                                           [de-identified] Signature:                                                               Date:

## 2023-03-10 NOTE — PROGRESS NOTES
[facility]  Physical Therapy Daily Treatment Note  Date: 3/10/2023  Patient Name: Sundeep Hopkins  : 1950   MRN: [id number]  Referring Provider: ALISON Jaime - CNP  [address],  [facility] Diagnosis: Parkinson's    Outcome Measure: RENE= 52    S: See eval.   O:     Time 2991-9181 2x/week x 6 weeks (12)      Visit     Pain 0/10    ROM      Exercise     Nustep L6 x 8 min Seat 11, arms 11   Hamstring Curl alt      calf raises     Hip abd alt     Hip ext     March with AW     THERAPEUTIC ACTIVITIES Large, functional, dynamic, global movements used to build strength, balance, endurance, and flexibility and to improve physical performance. Step-ups - FWD Alt. 6\" x 2 min    Step-ups - LAT 6\" x 2 min, up & over    Step-ups - BWD     Alt lunges       NMR Feedback and cues necessary for developing neuromuscular control. Movement education and guided movement interventions  used to improve performance and control. To improve balance for safe community and home ambulation   March      Side stepping     Retro walk heel toe     Heel to toe     Fwd with cones     Ball push with cane     A:  Tolerated well. P: Continue POC.   Nolvia Crawley, PT    Treatment Charges: Mins Units   Initial Evaluation 30  1   Re-Evaluation     Ther Exercise         TE  15 1    Manual Therapy     MT     Ther Activities        TA     Gait Training          GT     Neuro Re-education NR     Modalities     Non-Billable Service Time     Other     Total Time/Units 45 2

## 2023-03-13 ENCOUNTER — HOSPITAL ENCOUNTER (OUTPATIENT)
Dept: PHYSICAL THERAPY | Age: 73
Setting detail: THERAPIES SERIES
Discharge: HOME | End: 2023-03-13
Payer: MEDICARE

## 2023-03-13 PROCEDURE — 97162 PT EVAL MOD COMPLEX 30 MIN: CPT | Performed by: PHYSICAL THERAPIST

## 2023-03-13 PROCEDURE — 97110 THERAPEUTIC EXERCISES: CPT | Performed by: PHYSICAL THERAPIST

## 2023-03-15 ENCOUNTER — HOSPITAL ENCOUNTER (OUTPATIENT)
Dept: PHYSICAL THERAPY | Age: 73
Setting detail: THERAPIES SERIES
Discharge: HOME | End: 2023-03-15
Payer: MEDICARE

## 2023-03-15 PROCEDURE — 97110 THERAPEUTIC EXERCISES: CPT

## 2023-03-15 PROCEDURE — 97530 THERAPEUTIC ACTIVITIES: CPT

## 2023-03-15 NOTE — PROGRESS NOTES
[facility]  Physical Therapy Daily Treatment Note  Date: 3/15/2023  Patient Name: Kenneth Oden  : 1950   MRN: [id number]  Referring Provider: ALISON Mcneal - CNP  [place],   [facility] Diagnosis: Parkinson's    Outcome Measure: RENE= 52    S: Patient reports having a rough day today, feels shaky. O:     Time 5598-3669 2x/week x 6 weeks (12)      Visit     Pain 0/10    ROM      Exercise     Nustep L5 x 8 min Seat 11, arms 11   Hamstring Curl alt 2# x 1.5 min     calf raises 2# x 1.5 min    Hip abd alt 2# x 1.5 min    Hip ext alt 2# x 1.5 min    March with AW 2# x 1.5 min     THERAPEUTIC ACTIVITIES Large, functional, dynamic, global movements used to build strength, balance, endurance, and flexibility and to improve physical performance. Step-ups - FWD Alt. 6\" x 2 min    Step-ups - LAT 6\" x 2 min, up & over    Step-ups - BWD     Alt lunges  2# x 1.5 min     NMR Feedback and cues necessary for developing neuromuscular control. Movement education and guided movement interventions  used to improve performance and control. To improve balance for safe community and home ambulation   March     Side stepping     Retro walk heel toe     Heel to toe     Fwd with cones     Ball push with cane     A:  Tolerated well. P: Continue POC.   Keeley Mason PTA    Treatment Charges: Mins Units   Initial Evaluation     Re-Evaluation     Ther Exercise         TE 31  2   Manual Therapy     MT     Ther Activities        TA 8 1   Gait Training          GT     Neuro Re-education NR     Modalities     Non-Billable Service Time     Other     Total Time/Units 39 3

## 2023-03-20 ENCOUNTER — HOSPITAL ENCOUNTER (OUTPATIENT)
Dept: PHYSICAL THERAPY | Age: 73
Setting detail: THERAPIES SERIES
Discharge: HOME | End: 2023-03-20
Payer: MEDICARE

## 2023-03-20 PROCEDURE — 97110 THERAPEUTIC EXERCISES: CPT

## 2023-03-20 PROCEDURE — 97530 THERAPEUTIC ACTIVITIES: CPT

## 2023-03-20 NOTE — PROGRESS NOTES
[facility]  Physical Therapy Daily Treatment Note  Date: 3/20/2023  Patient Name: Benito Carrasco  : 1950   MRN: [id number]  Referring Provider: ALISON Sloan - CNP  [place],   [facility] Diagnosis: Parkinson's    Outcome Measure: RENE= 52    S: Patient reports tolerating last treatment well, he was fatigued, but was just the right amount of activity. O:     Time 4884-1975 2x/week x 6 weeks (12)      Visit 3/12    Pain 0/10    ROM      Exercise     Nustep L5 x 8 min Seat 11, arms 11   Hamstring Curl alt 2# x 1.5 min     calf raises 2# x 1.5 min    Hip abd alt 2# x 1.5 min    Hip ext alt 2# x 1.5 min    March with AW 2# x 1.5 min     THERAPEUTIC ACTIVITIES Large, functional, dynamic, global movements used to build strength, balance, endurance, and flexibility and to improve physical performance. Step-ups - FWD Alt. 6\" x 2 min    Step-ups - LAT 6\" x 2 min, up & over    Step-ups - BWD     Alt lunges  2# x 1.5 min     NMR Feedback and cues necessary for developing neuromuscular control. Movement education and guided movement interventions  used to improve performance and control. To improve balance for safe community and home ambulation   March     Side stepping     Retro walk heel toe     Heel to toe     Fwd with cones     Ball push with cane     A:  Tolerated well. P: Continue POC.   Cesar Barraza PTA    Treatment Charges: Mins Units   Initial Evaluation     Re-Evaluation     Ther Exercise         TE 32 2   Manual Therapy     MT     Ther Activities        TA 8 1   Gait Training          GT     Neuro Re-education NR     Modalities     Non-Billable Service Time     Other     Total Time/Units 40 3

## 2023-03-23 ENCOUNTER — HOSPITAL ENCOUNTER (OUTPATIENT)
Dept: PHYSICAL THERAPY | Age: 73
Setting detail: THERAPIES SERIES
Discharge: HOME | End: 2023-03-23
Payer: MEDICARE

## 2023-03-23 PROCEDURE — 97110 THERAPEUTIC EXERCISES: CPT

## 2023-03-23 PROCEDURE — 97530 THERAPEUTIC ACTIVITIES: CPT

## 2023-03-23 NOTE — PROGRESS NOTES
[facility]  Physical Therapy Daily Treatment Note  Date: 3/23/2023  Patient Name: Arlet Ledbetter  : 1950   MRN: [id number]  Referring Provider: ALISON Núñez - CNP  [place]  [place],   [facility] Diagnosis: Parkinson's    Outcome Measure: RENE= 52    S: Patient reports tolerating last treatment well, he was fatigued, but was just the right amount of activity. O:     Time 2238-4644 2x/week x 6 weeks (12)      Visit     Pain 0/10    ROM      Exercise     Nustep L5 x 8 min Seat 11, arms 11   Hamstring Curl alt 2# x 1.5 min     calf raises 2# x 1.5 min    Hip abd alt 2# x 1.5 min    Hip ext alt 2# x 1.5 min    March with AW 2# x 1.5 min     THERAPEUTIC ACTIVITIES Large, functional, dynamic, global movements used to build strength, balance, endurance, and flexibility and to improve physical performance. Step-ups - FWD Alt. 6\" x 2 min    Step-ups - LAT 6\" x 2 min, up & over    Step-ups - BWD     Alt lunges  2# x 1.5 min     NMR Feedback and cues necessary for developing neuromuscular control. Movement education and guided movement interventions  used to improve performance and control. To improve balance for safe community and home ambulation   March     Side stepping     Retro walk heel toe     Heel to toe     Fwd with cones     Ball push with cane     A:  Tolerated well. A little difficulty with marching, but stopped, thought about lifting legs slow and controlled, patient then able to resume exercise. P: Continue POC.   Cecilia Lackey, PTA    Treatment Charges: Mins Units   Initial Evaluation     Re-Evaluation     Ther Exercise         TE 34 2   Manual Therapy     MT     Ther Activities        TA 8 1   Gait Training          GT     Neuro Re-education NR     Modalities     Non-Billable Service Time     Other     Total Time/Units 42 3

## 2023-03-28 ENCOUNTER — HOSPITAL ENCOUNTER (OUTPATIENT)
Dept: PHYSICAL THERAPY | Age: 73
Setting detail: THERAPIES SERIES
Discharge: HOME | End: 2023-03-28
Payer: MEDICARE

## 2023-03-28 PROCEDURE — 97530 THERAPEUTIC ACTIVITIES: CPT | Performed by: PHYSICAL THERAPIST

## 2023-03-28 PROCEDURE — 97110 THERAPEUTIC EXERCISES: CPT | Performed by: PHYSICAL THERAPIST

## 2023-03-28 NOTE — PROGRESS NOTES
[facility]  Physical Therapy Daily Treatment Note  Date: 3/28/2023  Patient Name: Emily Anne  : 1950   MRN: [id number]  Referring Provider: ALISON Guerin - CNP  [place]  [place],   [facility] Diagnosis: Parkinson's    Outcome Measure: RENE= 52    S: Patient reports tolerating last treatment well, he was fatigued, but was just the right amount of activity. O:     Time 10:15-11:00 2x/week x 6 weeks (12)      Visit     Pain 0/10    ROM      Exercise     Nustep L5 x 8 min Seat 11, arms 11   Hamstring Curl alt 2# x 1.5 min     calf raises 2# x 1.5 min    Hip abd alt 2# x 1.5 min    Hip ext alt 2# x 1.5 min    March with AW 2# x 1.5 min     THERAPEUTIC ACTIVITIES Large, functional, dynamic, global movements used to build strength, balance, endurance, and flexibility and to improve physical performance. Step-ups - FWD Alt. 6\" x 2 min    Step-ups - LAT 6\" x 2 min, up & over    Step-ups - BWD     Alt lunges  2# x 1.5 min     NMR Feedback and cues necessary for developing neuromuscular control. Movement education and guided movement interventions  used to improve performance and control. To improve balance for safe community and home ambulation   March     Side stepping     Retro walk heel toe     Heel to toe     Fwd with cones     Ball push with cane     A:  Tolerated well. A little difficulty with marching, but stopped, thought about lifting legs slow and controlled, patient then able to resume exercise. P: Continue POC.   Rashaun Mathew, PT    Treatment Charges: Mins Units   Initial Evaluation     Re-Evaluation     Ther Exercise         TE 37 2   Manual Therapy     MT     Ther Activities        TA 8 1   Gait Training          GT     Neuro Re-education NR     Modalities     Non-Billable Service Time     Other     Total Time/Units 45 3

## 2023-03-30 ENCOUNTER — HOSPITAL ENCOUNTER (OUTPATIENT)
Dept: PHYSICAL THERAPY | Age: 73
Setting detail: THERAPIES SERIES
Discharge: HOME | End: 2023-03-30
Payer: MEDICARE

## 2023-03-30 PROCEDURE — 97530 THERAPEUTIC ACTIVITIES: CPT | Performed by: PHYSICAL THERAPIST

## 2023-03-30 PROCEDURE — 97112 NEUROMUSCULAR REEDUCATION: CPT | Performed by: PHYSICAL THERAPIST

## 2023-03-30 PROCEDURE — 97110 THERAPEUTIC EXERCISES: CPT | Performed by: PHYSICAL THERAPIST

## 2023-03-30 NOTE — PROGRESS NOTES
[facility]  Physical Therapy Daily Treatment Note  Date: 3/30/2023  Patient Name: Wilfredo Faust  : 1950   MRN: [id number]  Referring Provider: Olam End, APRN - CNP  [place]  [place],   [facility] Diagnosis: Parkinson's    Outcome Measure: RENE= 52    S: Patient reports tolerating last treatment well, he was fatigued, but was just the right amount of activity. O:   Added dynamic standing balance activities today. Time 10:05-11:00 2x/week x 6 weeks (12)      Visit     Pain 0/10    ROM      Exercise     Nustep L6 x 8 min Seat 11, arms 11   Hamstring Curl alt 2# x 2 min     calf raises 2# x 2 min    Hip abd alt 2# x 2 min    Hip ext alt 2# x 2 min    March with AW 2# x  2 min     THERAPEUTIC ACTIVITIES Large, functional, dynamic, global movements used to build strength, balance, endurance, and flexibility and to improve physical performance. Step-ups - FWD Alt. 6\" x 2 min    Step-ups - LAT 6\" x 2 min, up & over    Step-ups - BWD     Alt lunges  2# x 1.5 min     NMR Feedback and cues necessary for developing neuromuscular control. Movement education and guided movement interventions  used to improve performance and control. To improve balance for safe community and home ambulation   March 4 x 20'    Side stepping 4 x 20'    Retro walk heel toe     Heel to toe     Fwd with cones     Ball push with cane     A:  Tolerated well. A little difficulty with marching, but stopped, thought about lifting legs slow and controlled, patient then able to resume exercise. P: Continue POC.   Saray Cueto, PT    Treatment Charges: Mins Units   Initial Evaluation     Re-Evaluation     Ther Exercise         TE 39 2   Manual Therapy     MT     Ther Activities        TA 8 1   Gait Training          GT     Neuro Re-education NR 8 1   Modalities     Non-Billable Service Time     Other     Total Time/Units 55 4

## 2023-04-04 ENCOUNTER — HOSPITAL ENCOUNTER (OUTPATIENT)
Dept: PHYSICAL THERAPY | Age: 73
Setting detail: THERAPIES SERIES
Discharge: HOME | End: 2023-04-04
Payer: MEDICARE

## 2023-04-04 PROCEDURE — 97110 THERAPEUTIC EXERCISES: CPT | Performed by: PHYSICAL THERAPIST

## 2023-04-04 PROCEDURE — 97112 NEUROMUSCULAR REEDUCATION: CPT | Performed by: PHYSICAL THERAPIST

## 2023-04-04 PROCEDURE — 97530 THERAPEUTIC ACTIVITIES: CPT | Performed by: PHYSICAL THERAPIST

## 2023-04-04 NOTE — PROGRESS NOTES
[facility]  Physical Therapy Daily Treatment Note  Date: 2023  Patient Name: Vicenta Sepulveda  : 1950   MRN: [id number]  Referring Provider: ALISON Jimenes CNP,   [facility] Diagnosis: Parkinson's    Outcome Measure: RENE= 52    S: Patient reports tolerating last treatment well, he was fatigued, but was just the right amount of activity. O:   Added dynamic standing balance activities today. Time 10: 2x/week x 6 weeks (12)      Visit     Pain 0/10    ROM      Exercise     Nustep L6 x 8 min Seat 11, arms 11   Hamstring Curl alt 2# x 2 min     calf raises 2# x 2 min    Hip abd alt 2# x 2 min    Hip ext alt 2# x 2 min    March with AW 2# x  2 min on foam    THERAPEUTIC ACTIVITIES Large, functional, dynamic, global movements used to build strength, balance, endurance, and flexibility and to improve physical performance. Step-ups - FWD Alt. 6\" x 2 min    Step-ups - LAT 6\" x 2 min, up & over    Step-ups - BWD     Alt lunges  2# x 1.5 min     NMR Feedback and cues necessary for developing neuromuscular control. Movement education and guided movement interventions  used to improve performance and control. To improve balance for safe community and home ambulation   March 4 x 20'    Side stepping 4 x 20'    Retro walk heel toe     Heel to toe     Fwd with cones     Ball push with cane     A:  Tolerated well. A little difficulty with marching, but stopped, thought about lifting legs slow and controlled, patient then able to resume exercise. P: Continue POC.   Daniel Macias, PT    Treatment Charges: Mins Units   Initial Evaluation     Re-Evaluation     Ther Exercise         TE 39 2   Manual Therapy     MT     Ther Activities        TA 8 1   Gait Training          GT     Neuro Re-education NR 8 1   Modalities     Non-Billable Service Time     Other     Total Time/Units 55 4

## 2023-04-06 ENCOUNTER — HOSPITAL ENCOUNTER (OUTPATIENT)
Dept: PHYSICAL THERAPY | Age: 73
Setting detail: THERAPIES SERIES
Discharge: HOME | End: 2023-04-06
Payer: MEDICARE

## 2023-04-06 PROCEDURE — 97110 THERAPEUTIC EXERCISES: CPT | Performed by: PHYSICAL THERAPIST

## 2023-04-06 PROCEDURE — 97530 THERAPEUTIC ACTIVITIES: CPT | Performed by: PHYSICAL THERAPIST

## 2023-04-06 PROCEDURE — 97112 NEUROMUSCULAR REEDUCATION: CPT | Performed by: PHYSICAL THERAPIST

## 2023-04-06 NOTE — PROGRESS NOTES
[facility]  Physical Therapy Daily Treatment Note  Date: 2023  Patient Name: Nathaniel Valdovinos  : 1950   MRN: [id number]  Referring Provider: Ace Gomez, APRN - CNP  [place]  [place],   [facility] Diagnosis: Parkinson's    Outcome Measure: RENE= 52    S: Patient reports tolerating last treatment well. O:   Added heel to toe today  Time 10:  2x/week x 6 weeks (12)      Visit     Pain 0/10    ROM      Exercise     Nustep L6 x 8 min Seat 11, arms 11   Hamstring Curl alt 2# x 2 min     calf raises 2# x 2 min    Hip abd alt 2# x 2 min    Hip ext alt 2# x 2 min    March with AW 2# x  2 min      THERAPEUTIC ACTIVITIES Large, functional, dynamic, global movements used to build strength, balance, endurance, and flexibility and to improve physical performance. Step-ups - FWD Alt. 6\" x 2 min    Step-ups - LAT 6\" x 2 min, up & over    Step-ups - BWD     Alt lunges  2# x 1.5 min     NMR Feedback and cues necessary for developing neuromuscular control. Movement education and guided movement interventions  used to improve performance and control. To improve balance for safe community and home ambulation   March 4 x 20'    Side stepping 4 x 20'    Retro walk heel toe     Heel to toe 4 x 20'    Fwd with cones     Ball push with cane     A:  Tolerated well. P: Continue POC.   Roddy Fournier, SHIVA    Treatment Charges: Mins Units   Initial Evaluation     Re-Evaluation     Ther Exercise         TE 42 2   Manual Therapy     MT     Ther Activities        TA 8 1   Gait Training          GT     Neuro Re-education NR 10 1   Modalities     Non-Billable Service Time     Other     Total Time/Units 60 4

## 2023-04-18 ENCOUNTER — HOSPITAL ENCOUNTER (OUTPATIENT)
Dept: PHYSICAL THERAPY | Age: 73
Setting detail: THERAPIES SERIES
Discharge: HOME | End: 2023-04-18
Payer: MEDICARE

## 2023-04-18 PROCEDURE — 97110 THERAPEUTIC EXERCISES: CPT | Performed by: PHYSICAL THERAPIST

## 2023-04-18 PROCEDURE — 97530 THERAPEUTIC ACTIVITIES: CPT | Performed by: PHYSICAL THERAPIST

## 2023-04-18 PROCEDURE — 97112 NEUROMUSCULAR REEDUCATION: CPT | Performed by: PHYSICAL THERAPIST

## 2023-04-18 NOTE — PROGRESS NOTES
[facility]  Physical Therapy Daily Treatment Note  Date: 2023  Patient Name: Marcy Chávez  : 1950   MRN: [id number]  Referring Provider: ALISON Anaya - CNP  [place]  [place],   [facility] Diagnosis: Parkinson's    Outcome Measure: RENE= 52    S: Patient reports tolerating last treatment well. O:     Time 10:03-11:03 2x/week x 6 weeks (12)      Visit     Pain 0/10    ROM      Exercise     Nustep L6 x 8 min Seat 11, arms 11   Hamstring Curl alt 2# x 2 min     calf raises 2# x 2 min    Hip abd alt 2# x 2 min    Hip ext alt 2# x 2 min    March with AW 2# x  2 min      THERAPEUTIC ACTIVITIES Large, functional, dynamic, global movements used to build strength, balance, endurance, and flexibility and to improve physical performance. Step-ups - FWD Alt. 6\" x 2 min    Step-ups - LAT 6\" x 2 min, up & over    Step-ups - BWD     Alt lunges  2# x 1.5 min     NMR Feedback and cues necessary for developing neuromuscular control. Movement education and guided movement interventions  used to improve performance and control. To improve balance for safe community and home ambulation   March 4 x 20'    Side stepping 4 x 20'    Retro walk heel toe     Heel to toe 4 x 20'    Fwd with cones     Ball push with cane     A:  Tolerated well. P: Continue POC.   Hadley Daniels, PT    Treatment Charges: Mins Units   Initial Evaluation     Re-Evaluation     Ther Exercise         TE 42 2   Manual Therapy     MT     Ther Activities        TA 8 1   Gait Training          GT     Neuro Re-education NR 10 1   Modalities     Non-Billable Service Time     Other     Total Time/Units 60 4

## 2023-04-19 NOTE — PROGRESS NOTES
PT PROGRESS REPORT      PATIENT: Melany Polanco   Date: 4/20/2023  DIAGNOSIS:  Parkinson's  PHYSICIAN:  Juan Sylvester, APRN - CNP  [place]  [place],  [address]     ATTENDANCE:  12 OUT OF 12 APPOINTMENT FROM   TO 3/13/2023  TREATMENTS RECEIVED:  THEREX, GAIT, BALANCE, HEP     INITIAL PROBLEM CURRENT STATUS        RENE BALANCE TEST= 47 49                 DECREASED STRENGTH RLE      HIP              Flex= 4/5              Abd= 4/5              Add= 4/5  Knee    flex = 4/5    / =  4/5  Ankle    pf=      4/5    df=     4/5              HIP              Flex= 4+/5              Abd= 4/5              Add= 4/5  Knee    flex = 4+/5    / =  4+/5  Ankle    pf=      4+/5    df=     4/5       DECREASED STRENGTH RLE      HIP              Flex= 4/5              Abd= 4/5              Add= 4/5  Knee    flex = 4/5    / =  4/5  Ankle    pf=      4/5    df=     4/5        HIP              Flex= 4+/5              Abd= 4/5              Add= 4/5  Knee    flex = 4+/5    / =  4+/5  Ankle    pf=      4+/5    df=     4/5     COMMENTS/ RECOMMENDATIONS:    POC: Continue PT 2x/wk for 4 additiional weeks: Therex,  Balance training, PRE, Gait, HEP. GOALS:    1. Increase strength BLE to 5/5.                    2. Improve dynamic balance to allow safe community mobility.                      I have reviewed the Plan of Care established for skilled therapy services and certify that the services are required and that they will be provided while the patient is under my care.      ___________________         ___________  Physician signature                    date      [address], PLEASE FEEL FREE TO CONTACT ME AT   [address] : [phone]  PHONE: [phone]

## 2023-04-20 ENCOUNTER — HOSPITAL ENCOUNTER (OUTPATIENT)
Dept: PHYSICAL THERAPY | Age: 73
Setting detail: THERAPIES SERIES
Discharge: HOME | End: 2023-04-20
Payer: MEDICARE

## 2023-04-20 PROCEDURE — 97530 THERAPEUTIC ACTIVITIES: CPT | Performed by: PHYSICAL THERAPIST

## 2023-04-20 PROCEDURE — 97112 NEUROMUSCULAR REEDUCATION: CPT | Performed by: PHYSICAL THERAPIST

## 2023-04-20 PROCEDURE — 97110 THERAPEUTIC EXERCISES: CPT | Performed by: PHYSICAL THERAPIST

## 2023-04-20 NOTE — PROGRESS NOTES
[facility]  Physical Therapy Daily Treatment Note  Date: 2022  Patient Name: Glendy Bynum  : 1950   MRN: [id number]  Referring Provider: ALISON Mancera - CNP  [place]  [place],   [facility] Diagnosis: Parkinson's    Outcome Measure: RENE= 52    S: Patient reports feeling better today. Right knee very painful from squats last session  O: Discontinue squats and reinstitute aw marching and hip abd at bar. Time 9865-9631 2x/week x 6 weeks (12)      Visit  Re-Eval done @ [facility]   Pain 0/10    ROM      Exercise     Nustep L5 x 8 min Seat 11, arms 11   calf raises     Hip abd alt Alt. 2# x 2 min, 1 hand hold    Hip ext     March with AW 2# x 2 min, 1 hand hold    THERAPEUTIC ACTIVITIES Large, functional, dynamic, global movements used to build strength, balance, endurance, and flexibility and to improve physical performance. Step-ups - FWD Alt. 6\" x 2 min    Step-ups - LAT 6\" x 2 min, up & over    Step-ups - BWD     Alt lunges 6\" x 2 min     chops 2 kg x 20 each     overhead lifts/ no squat 2 kg x 15     NMR Feedback and cues necessary for developing neuromuscular control. Movement education and guided movement interventions  used to improve performance and control. To improve balance for safe community and home ambulation   March 4 x 20' with alt arm movements CG for all NMR, especially for turns   Side stepping 4 x 20' step over movement    Retro walk  4 x 20'   Heel to toe 4 x 20'   Navigate around cones         A:  Tolerated well, some difficulty initiating gait and moving feet during turns, has freezing of legs especially left. VC's to slow pace and think about lifting feet. P: Continue POC.   Mason Bis, PT    Treatment Charges: Mins Units   Initial Evaluation       Re-Evaluation     Ther Exercise         TE 12 1    Manual Therapy     MT     Ther Activities        TA 30 1   Gait Training          GT     Neuro Re-education NR 10 1 Modalities       Non-Billable Service Time     Other     Total Time/Units 52 3 no

## 2023-04-20 NOTE — PROGRESS NOTES
[facility]  Physical Therapy Daily Treatment Note  Date: 2023  Patient Name: Los Escoto  : 1950   MRN: [id number]  Referring Provider: ALISON Yang CNP,   [facility] Diagnosis: Parkinson's    Outcome Measure: RENE= 52    S: Patient reports tolerating last treatment well. O:     Time 10:10-11:10 2x/week x 6 weeks (12)      Visit     Pain 0/10    ROM      Exercise     Nustep L6 x 8 min Seat 11, arms 11   Hamstring Curl alt 2# x 2 min     calf raises 2# x 2 min    Hip abd alt 2# x 2 min    Hip ext alt 2# x 2 min    March with AW 2# x  2 min      THERAPEUTIC ACTIVITIES Large, functional, dynamic, global movements used to build strength, balance, endurance, and flexibility and to improve physical performance. Step-ups - FWD Alt. 6\" x 2 min    Step-ups - LAT 6\" x 2 min, up & over    Step-ups - BWD     Alt lunges  2# x 1.5 min     NMR Feedback and cues necessary for developing neuromuscular control. Movement education and guided movement interventions  used to improve performance and control. To improve balance for safe community and home ambulation   March 4 x 20'    Side stepping 4 x 20'    Retro walk heel toe     Heel to toe 4 x 20'    Fwd with cones     Ball push with cane     A:  Tolerated well. Se reeval  P: Continue POC.  Request sent for additional PT  Nathan Zelaya PT    Treatment Charges: Mins Units   Initial Evaluation     Re-Evaluation     Ther Exercise         TE 42 2   Manual Therapy     MT     Ther Activities        TA 8 1   Gait Training          GT     Neuro Re-education NR 10 1   Modalities     Non-Billable Service Time     Other     Total Time/Units 60 4

## 2023-04-27 ENCOUNTER — HOSPITAL ENCOUNTER (OUTPATIENT)
Dept: PHYSICAL THERAPY | Age: 73
Setting detail: THERAPIES SERIES
Discharge: HOME | End: 2023-04-27
Payer: MEDICARE

## 2023-04-27 PROCEDURE — 97112 NEUROMUSCULAR REEDUCATION: CPT | Performed by: PHYSICAL THERAPIST

## 2023-04-27 PROCEDURE — 97110 THERAPEUTIC EXERCISES: CPT | Performed by: PHYSICAL THERAPIST

## 2023-04-27 PROCEDURE — 97530 THERAPEUTIC ACTIVITIES: CPT | Performed by: PHYSICAL THERAPIST

## 2023-04-27 NOTE — PROGRESS NOTES
[facility]  Physical Therapy Daily Treatment Note  Date: 2023  Patient Name: Apolinar Barth  : 1950   MRN: [id number]  Referring Provider: ALISON Tucker - CNP  [place]  [place],   [facility] Diagnosis: Parkinson's    Outcome Measure: RENE= 52    S: Patient reports tolerating last treatment well. O:     Time 10:05-11:05 2x/week x 6 weeks (12)  + 2x/wk, 4 weeks    Visit   1/8    Pain 0/10    ROM      Exercise     Nustep L6 x 8 min Seat 11, arms 11   Hamstring Curl alt 2# x 2 min     calf raises 2# x 2 min    Hip abd alt 2# x 2 min    Hip ext alt 2# x 2 min    March with AW 2# x  2 min      THERAPEUTIC ACTIVITIES Large, functional, dynamic, global movements used to build strength, balance, endurance, and flexibility and to improve physical performance. Step-ups - FWD Alt. 6\" x 2 min    Step-ups - LAT 6\" x 2 min, up & over    Step-ups - BWD     Alt lunges  2# x 1.5 min     NMR Feedback and cues necessary for developing neuromuscular control. Movement education and guided movement interventions  used to improve performance and control. To improve balance for safe community and home ambulation   March 4 x 20'    Side stepping 4 x 20'    Retro walk heel toe     Heel to toe 4 x 20'    Fwd with cones     Ball push with cane     A:  Tolerated well. Se reeval  P: Continue POC.  Request sent for additional PT  Yousuf Cruz PT    Treatment Charges: Mins Units   Initial Evaluation     Re-Evaluation     Ther Exercise         TE 42 2   Manual Therapy     MT     Ther Activities        TA 8 1   Gait Training          GT     Neuro Re-education NR 10 1   Modalities     Non-Billable Service Time     Other     Total Time/Units 60 4

## 2023-05-02 ENCOUNTER — HOSPITAL ENCOUNTER (OUTPATIENT)
Dept: PHYSICAL THERAPY | Age: 73
Setting detail: THERAPIES SERIES
Discharge: HOME | End: 2023-05-02
Payer: MEDICARE

## 2023-05-02 PROCEDURE — 97110 THERAPEUTIC EXERCISES: CPT | Performed by: PHYSICAL THERAPIST

## 2023-05-02 PROCEDURE — 97112 NEUROMUSCULAR REEDUCATION: CPT | Performed by: PHYSICAL THERAPIST

## 2023-05-02 PROCEDURE — 97530 THERAPEUTIC ACTIVITIES: CPT | Performed by: PHYSICAL THERAPIST

## 2023-05-02 NOTE — PROGRESS NOTES
[facility]  Physical Therapy Daily Treatment Note  Date: 2023  Patient Name: Rylie Salcedo  : 1950   MRN: [id number]  Referring Provider: ALISON Sanders - CNP  [place]  [place],   [facility] Diagnosis: Parkinson's    Outcome Measure: RENE= 52    S: Patient reports tolerating last treatment well. O:     Time 10:03-11:03 2x/week x 6 weeks (12)  + 2x/wk, 4 weeks    Visit   2/8    Pain 0/10    ROM      Exercise     Nustep L6 x 8 min Seat 11, arms 11   Hamstring Curl alt 2# x 2 min     calf raises 2# x 2 min    Hip abd alt 2# x 2 min    Hip ext alt 2# x 2 min    March with AW 2# x  2 min      THERAPEUTIC ACTIVITIES Large, functional, dynamic, global movements used to build strength, balance, endurance, and flexibility and to improve physical performance. Step-ups - FWD Alt. 6\" x 2 min    Step-ups - LAT 6\" x 2 min, up & over    Step-ups - BWD     Alt lunges  2# x 1.5 min     NMR Feedback and cues necessary for developing neuromuscular control. Movement education and guided movement interventions  used to improve performance and control. To improve balance for safe community and home ambulation   March 4 x 20'    Side stepping 4 x 20'    Retro walk heel toe     Heel to toe 4 x 20'    Fwd with cones     Ball push with cane     A:  Tolerated well. Se reeval  P: Continue POC.  Request sent for additional PT  April Osborne PT    Treatment Charges: Mins Units   Initial Evaluation     Re-Evaluation     Ther Exercise         TE 42 2   Manual Therapy     MT     Ther Activities        TA 8 1   Gait Training          GT     Neuro Re-education NR 10 1   Modalities     Non-Billable Service Time     Other     Total Time/Units 60 4

## 2023-05-05 ENCOUNTER — HOSPITAL ENCOUNTER (OUTPATIENT)
Dept: PHYSICAL THERAPY | Age: 73
Setting detail: THERAPIES SERIES
Discharge: HOME | End: 2023-05-05
Payer: MEDICARE

## 2023-05-05 PROCEDURE — 97110 THERAPEUTIC EXERCISES: CPT | Performed by: PHYSICAL THERAPIST

## 2023-05-05 PROCEDURE — 97112 NEUROMUSCULAR REEDUCATION: CPT | Performed by: PHYSICAL THERAPIST

## 2023-05-05 PROCEDURE — 97530 THERAPEUTIC ACTIVITIES: CPT | Performed by: PHYSICAL THERAPIST

## 2023-05-09 ENCOUNTER — HOSPITAL ENCOUNTER (OUTPATIENT)
Dept: PHYSICAL THERAPY | Age: 73
Setting detail: THERAPIES SERIES
Discharge: HOME | End: 2023-05-09
Payer: MEDICARE

## 2023-05-09 PROCEDURE — 97112 NEUROMUSCULAR REEDUCATION: CPT

## 2023-05-09 PROCEDURE — 97530 THERAPEUTIC ACTIVITIES: CPT

## 2023-05-09 PROCEDURE — 97110 THERAPEUTIC EXERCISES: CPT

## 2023-05-09 NOTE — PROGRESS NOTES
[facility]  Physical Therapy Daily Treatment Note  Date: 2023  Patient Name: Marcy Chávez  : 1950   MRN: [id number]  Referring Provider: ALISON Anaya - CNP  [place]  [place],   [facility] Diagnosis: Parkinson's    Outcome Measure: RENE= 52    S: Patient reports tolerating last treatment well. O:     Time 4197-0534 2x/week x 6 weeks (12)  + 2x/wk, 4 weeks    Visit   48    Pain 0/10    ROM      Exercise     Nustep L6 x 8 min Seat 11, arms 11   Hamstring Curl alt 2# x 2 min     calf raises 2# x 2 min    Hip abd alt 2# x 2 min    Hip ext alt 2# x 2 min    March with AW 2# x  2 min      THERAPEUTIC ACTIVITIES Large, functional, dynamic, global movements used to build strength, balance, endurance, and flexibility and to improve physical performance. Step-ups - FWD Alt. 6\" x 2 min    Step-ups - LAT 6\" x 2 min, up & over    Step-ups - BWD     Alt lunges  2# x 2 min     NMR Feedback and cues necessary for developing neuromuscular control. Movement education and guided movement interventions  used to improve performance and control. To improve balance for safe community and home ambulation   March 4 x 20'    Side stepping 4 x 20'    Heel to toe 4 x 20'    Retro walk heel toe     Fwd with cones     Ball push with cane     A:  Tolerated well. P: Continue POC.    Galina Sharma, JAZZMINE    Treatment Charges: Mins Units   Initial Evaluation     Re-Evaluation     Ther Exercise         TE 42 2   Manual Therapy     MT     Ther Activities        TA 8 1   Gait Training          GT     Neuro Re-education NR 10 1   Modalities     Non-Billable Service Time     Other     Total Time/Units 60 4

## 2023-05-11 ENCOUNTER — HOSPITAL ENCOUNTER (OUTPATIENT)
Dept: PHYSICAL THERAPY | Age: 73
Setting detail: THERAPIES SERIES
Discharge: HOME | End: 2023-05-11
Payer: MEDICARE

## 2023-05-11 PROCEDURE — 97110 THERAPEUTIC EXERCISES: CPT

## 2023-05-11 PROCEDURE — 97530 THERAPEUTIC ACTIVITIES: CPT

## 2023-05-11 PROCEDURE — 97112 NEUROMUSCULAR REEDUCATION: CPT

## 2023-05-11 NOTE — PROGRESS NOTES
[facility]  Physical Therapy Daily Treatment Note  Date: 2023  Patient Name: Alley Peter  : 1950   MRN: [id number]  Referring Provider: ALISON Munoz - CNP  [place]  [place],   [facility] Diagnosis: Parkinson's    Outcome Measure: RENE= 52    S: Patient reports tolerating last treatment well. He worked in the yard yesterday and feels more stiff today. O:     Time 6058-3613 2x/week x 6 weeks (12)  + 2x/wk, 4 weeks    Visit   5/8    Pain 0/10    ROM      Exercise     Nustep L6 x 8 min Seat 11, arms 11   Hamstring Curl alt 2# x 2 min     calf raises 2# x 2 min    Hip abd alt 2# x 2 min    Hip ext alt 2# x 2 min    March with AW 2# x  2 min      THERAPEUTIC ACTIVITIES Large, functional, dynamic, global movements used to build strength, balance, endurance, and flexibility and to improve physical performance. Step-ups - FWD Alt. 6\" x 2 min    Step-ups - LAT 6\" x 2 min, up & over    Step-ups - BWD     Alt lunges  2# x 2 min     NMR Feedback and cues necessary for developing neuromuscular control. Movement education and guided movement interventions  used to improve performance and control. To improve balance for safe community and home ambulation   March 4 x 20'    Side stepping 4 x 20'    Heel to toe 4 x 20'    Retro walk heel toe     Fwd with cones     Ball push with cane     A:  Tolerated well. P: Continue POC.    Colette Medico, PTA    Treatment Charges: Mins Units   Initial Evaluation     Re-Evaluation     Ther Exercise         TE 42 2   Manual Therapy     MT     Ther Activities        TA 8 1   Gait Training          GT     Neuro Re-education NR 10 1   Modalities     Non-Billable Service Time     Other     Total Time/Units 60 4

## 2023-05-18 ENCOUNTER — HOSPITAL ENCOUNTER (OUTPATIENT)
Dept: PHYSICAL THERAPY | Age: 73
Setting detail: THERAPIES SERIES
Discharge: HOME | End: 2023-05-18
Payer: MEDICARE

## 2023-05-18 PROCEDURE — 97110 THERAPEUTIC EXERCISES: CPT

## 2023-05-18 PROCEDURE — 97530 THERAPEUTIC ACTIVITIES: CPT

## 2023-05-18 PROCEDURE — 97112 NEUROMUSCULAR REEDUCATION: CPT

## 2023-05-18 NOTE — PROGRESS NOTES
[facility]  Physical Therapy Daily Treatment Note  Date: 2023  Patient Name: Sheila Mohan  : 1950   MRN: [id number]  Referring Provider: Arni Pink APRN - CNP  formerly [facility],  [address] [place] [address]     Medical Diagnosis: Parkinson's    Outcome Measure: RENE= 52    S: Patient reports having some tough couple days, fell leaving [employer] but was caught by usher, today can't get feet to move well. O: Patient performed modified activity today, exercised as tolerated. Time 09- 2x/week x 6 weeks (12)  + 2x/wk, 4 weeks    Visit   6    Pain 0/10    ROM      Exercise     Nustep L6 x 8 min Seat 11, arms 11   Hamstring Curl alt 2# x 2 min     calf raises 2# x 1.5 min as brisa    Hip abd alt 2# x 1:20 min as brisa    Hip ext alt 2# x 1:40 min as brisa    March with AW 2# x  2 min      THERAPEUTIC ACTIVITIES Large, functional, dynamic, global movements used to build strength, balance, endurance, and flexibility and to improve physical performance. Step-ups - FWD Alt. 6\" x 2 min    Step-ups - LAT 6\" x 1.5 min, up & over, as brisa    Step-ups - BWD     Alt lunges  2# x 1:40 min as brisa     NMR Feedback and cues necessary for developing neuromuscular control. Movement education and guided movement interventions  used to improve performance and control. To improve balance for safe community and home ambulation   March 4 x 20'    Side stepping 4 x 20'    Heel to toe 4 x 20'    Retro walk heel toe     Fwd with cones     Ball push with cane     A:  Tolerated fairly. With verbal cues to lift/march while turning patient gets less freezing of feet. P: Continue POC.    De Andrez, PTA    Treatment Charges: Mins Units   Initial Evaluation     Re-Evaluation     Ther Exercise         TE 39 2   Manual Therapy     MT     Ther Activities        TA 8 1   Gait Training          GT     Neuro Re-education NR 10 1   Modalities     Non-Billable Service Time     Other     Total Time/Units 57 4

## 2023-05-23 ENCOUNTER — HOSPITAL ENCOUNTER (OUTPATIENT)
Dept: PHYSICAL THERAPY | Age: 73
Setting detail: THERAPIES SERIES
Discharge: HOME | End: 2023-05-23
Payer: MEDICARE

## 2023-05-23 PROCEDURE — 97112 NEUROMUSCULAR REEDUCATION: CPT

## 2023-05-23 PROCEDURE — 97530 THERAPEUTIC ACTIVITIES: CPT

## 2023-05-23 PROCEDURE — 97110 THERAPEUTIC EXERCISES: CPT

## 2023-05-23 NOTE — PROGRESS NOTES
[facility]  Physical Therapy Daily Treatment Note  Date: 2023  Patient Name: Ginger Vick  : 1950   MRN: [id number]  Referring Provider: ALISON Woody - JADA  [place] Michael[place],   [facility] Diagnosis: Parkinson's    Outcome Measure: RENE= 52    S: Patient reports no new complaints. O: Patient able to resume previous level of activity after having some difficulty last session. Time 6963-7503 2x/week x 6 weeks (12)  + 2x/wk, 4 weeks    Visit   7/8    Pain 0/10    ROM      Exercise     Nustep L5 x 8 min Seat 11, arms 11   Hamstring Curl alt 2# x 2 min     calf raises 2# x 2 min    Hip abd alt 2# x 2 min     Hip ext alt 2# x 2 min     March with AW 2# x  2 min      THERAPEUTIC ACTIVITIES Large, functional, dynamic, global movements used to build strength, balance, endurance, and flexibility and to improve physical performance. Step-ups - FWD Alt. 6\" x 2 min    Step-ups - LAT 6\" x 2 min, up & over    Step-ups - BWD     Alt lunges  2# x 2 min      NMR Feedback and cues necessary for developing neuromuscular control. Movement education and guided movement interventions  used to improve performance and control. To improve balance for safe community and home ambulation   March 4 x 20'    Side stepping 4 x 20'    Heel to toe 4 x 20'    Retro walk heel toe     Fwd with cones     Ball push with cane     A:  Tolerated fairly. With verbal cues to lift/march while turning patient gets less freezing of feet. P: Continue POC.    Gracia Moreira PTA    Treatment Charges: Mins Units   Initial Evaluation     Re-Evaluation     Ther Exercise         TE 37 2   Manual Therapy     MT     Ther Activities        TA 8 1   Gait Training          GT     Neuro Re-education NR 10 1   Modalities     Non-Billable Service Time     Other     Total Time/Units 55 4

## 2023-05-31 NOTE — PROGRESS NOTES
PT PROGRESS REPORT      PATIENT: Al Peterson   Date: 6/01/2023  DIAGNOSIS:  Parkinson's  PHYSICIAN:  Samuel Salvador, APRN - JADA  [facility]  [place],  [address] [facility]     ATTENDANCE:  20  OUT OF 20 APPOINTMENT FROM  3/13/2023 TO  6/01/2023  TREATMENTS RECEIVED:  THEREX, GAIT, BALANCE, HEP     INITIAL PROBLEM 4/20/2023 CURRENT STATUS         RENE BALANCE TEST= 47 49 49                    DECREASED STRENGTH RLE      HIP              Flex= 4/5              Abd= 4/5              Add= 4/5  Knee    flex = 4/5    / =  4/5  Ankle    pf=      4/5    df=     4/5              HIP              Flex= 4+/5              Abd= 4/5              Add= 4/5  Knee    flex = 4+/5    / =  4+/5  Ankle    pf=      4+/5    df=     4/5        HIP              Flex= 5-/5              Abd= 5-/5              Add= 5-/5  Knee    flex = 4+/5    / =  5-/5  Ankle    pf=     5/5    df=    4+/5    DECREASED STRENGTH LLE      HIP              Flex= 4/5              Abd= 4/5              Add= 4/5  Knee    flex = 4/5    / =  4/5  Ankle    pf=      4/5    df=     4/5        HIP              Flex= 4+/5              Abd= 4/5              Add= 4/5  Knee    flex = 4+/5    / =  4+/5  Ankle    pf=      4+/5    df=     4/5     HIP              Flex5-/5              Abd= 5-/5              Add= 5-/5  Knee    flex = 5-/5    / =  5-/5  Ankle    pf=     5-/5    df=     4+/5     COMMENTS/ RECOMMENDATIONS: Pt progressing slowly with strength and balance. Pt would benefit from additional PT to continue to work toward LTGs and improve safety with home tasks. POC: Continue PT 2x/wk for 4 additiional weeks: Therex,  Balance training, PRE, Gait, HEP. GOALS:    1. Increase strength BLE to 5/5.                    2. Improve dynamic balance to allow safe community mobility.                      I have reviewed the Plan of Care established for skilled therapy services and certify that the services are required and that they will be provided while the patient is under my

## 2023-06-01 ENCOUNTER — HOSPITAL ENCOUNTER (OUTPATIENT)
Dept: PHYSICAL THERAPY | Age: 73
Setting detail: THERAPIES SERIES
Discharge: HOME | End: 2023-06-01
Payer: MEDICARE

## 2023-06-01 PROCEDURE — 97110 THERAPEUTIC EXERCISES: CPT | Performed by: PHYSICAL THERAPIST

## 2023-06-01 PROCEDURE — 97530 THERAPEUTIC ACTIVITIES: CPT | Performed by: PHYSICAL THERAPIST

## 2023-06-01 PROCEDURE — 97112 NEUROMUSCULAR REEDUCATION: CPT | Performed by: PHYSICAL THERAPIST

## 2023-06-01 NOTE — PROGRESS NOTES
[facility]  Physical Therapy Daily Treatment Note  Date: 2023  Patient Name: Art Oneill  : 1950   MRN: [id number]  Referring Provider: ALISON Hopkins - CNP  [place],   [facility] Diagnosis: Parkinson's    Outcome Measure: RENE= 52    S: Patient reports no new complaints. Wishes to continue PT  O:    Time 5383-3098 2x/week x 6 weeks (12)  + 2x/wk, 4 weeks    Visit   8/    Pain 0/10    ROM      Exercise     Nustep L5 x 8 min Seat 11, arms 11   Hamstring Curl alt 2# x 2 min     calf raises 2# x 2 min    Hip abd alt 2# x 2 min     Hip ext alt 2# x 2 min     March with AW 2# x  2 min      THERAPEUTIC ACTIVITIES Large, functional, dynamic, global movements used to build strength, balance, endurance, and flexibility and to improve physical performance. Step-ups - FWD Alt. 6\" x 2 min    Step-ups - LAT 6\" x 2 min, up & over    Step-ups - BWD     Alt lunges  2# x 2 min      NMR Feedback and cues necessary for developing neuromuscular control. Movement education and guided movement interventions  used to improve performance and control. To improve balance for safe community and home ambulation   March 4 x 20'    Side stepping 4 x 20'    Heel to toe 4 x 20'    Retro walk heel toe     Fwd with cones     Ball push with cane     A:  Tolerated fairly. With verbal cues to lift/march while turning patient gets less freezing of feet. See reeval note. P: Continue POC.  Request sent to physician  Darryl Ugalde PT    Treatment Charges: Mins Units   Initial Evaluation     Re-Evaluation     Ther Exercise         TE 40 2   Manual Therapy     MT     Ther Activities        TA 10 1   Gait Training          GT     Neuro Re-education NR 10 1   Modalities     Non-Billable Service Time     Other     Total Time/Units 60 4

## 2023-06-06 ENCOUNTER — HOSPITAL ENCOUNTER (OUTPATIENT)
Dept: PHYSICAL THERAPY | Age: 73
Setting detail: THERAPIES SERIES
Discharge: HOME | End: 2023-06-06
Payer: MEDICARE

## 2023-06-06 PROCEDURE — 97110 THERAPEUTIC EXERCISES: CPT | Performed by: PHYSICAL THERAPIST

## 2023-06-06 PROCEDURE — 97530 THERAPEUTIC ACTIVITIES: CPT | Performed by: PHYSICAL THERAPIST

## 2023-06-06 PROCEDURE — 97112 NEUROMUSCULAR REEDUCATION: CPT | Performed by: PHYSICAL THERAPIST

## 2023-06-06 NOTE — PROGRESS NOTES
[facility]  Physical Therapy Daily Treatment Note  Date: 2023  Patient Name: Kenney Suero  : 1950   MRN: [id number]  Referring Provider: ALISON Cagle - CNP  [place]  [place],   [facility] Diagnosis: Parkinson's    Outcome Measure: RENE= 52    S: Patient reports no new complaints. O:  Approval   Time 3764-6014 2x/week x 6 weeks (12)  + 2x/wk, 4 weeks   + 2x/wk, 4 weeks    Visit     Pain 0/10    ROM      Exercise     Nustep L6 x 8 min Seat 11, arms 11   Hamstring Curl alt 2# x 2 min     calf raises 2# x 2 min    Hip abd alt 2# x 2 min     Hip ext alt 2# x 2 min     March with AW 2# x  2 min      THERAPEUTIC ACTIVITIES Large, functional, dynamic, global movements used to build strength, balance, endurance, and flexibility and to improve physical performance. Step-ups - FWD Alt. 6\" x 2 min    Step-ups - LAT 6\" x 2 min, up & over    Step-ups - BWD     Alt lunges  2# x 2 min      NMR Feedback and cues necessary for developing neuromuscular control. Movement education and guided movement interventions  used to improve performance and control. To improve balance for safe community and home ambulation   March 4 x 20'    Side stepping 4 x 20'    Heel to toe 4 x 20'    Retro walk heel toe     Fwd with cones     Ball push with cane     A:  Tolerated fairly. With verbal cues to lift/march while turning patient gets less freezing of feet. P: Continue POC.  Request sent to physician  Izaiah Ceuva, PT    Treatment Charges: Mins Units   Initial Evaluation     Re-Evaluation     Ther Exercise         TE 39 2   Manual Therapy     MT     Ther Activities        TA 8 1   Gait Training          GT     Neuro Re-education NR 8 1   Modalities     Non-Billable Service Time     Other     Total Time/Units 55 4

## 2023-06-08 ENCOUNTER — HOSPITAL ENCOUNTER (OUTPATIENT)
Dept: PHYSICAL THERAPY | Age: 73
Setting detail: THERAPIES SERIES
Discharge: HOME | End: 2023-06-08
Payer: MEDICARE

## 2023-06-08 PROCEDURE — 97110 THERAPEUTIC EXERCISES: CPT | Performed by: PHYSICAL THERAPIST

## 2023-06-08 PROCEDURE — 97530 THERAPEUTIC ACTIVITIES: CPT | Performed by: PHYSICAL THERAPIST

## 2023-06-08 PROCEDURE — 97112 NEUROMUSCULAR REEDUCATION: CPT | Performed by: PHYSICAL THERAPIST

## 2023-06-08 NOTE — PROGRESS NOTES
[facility]  Physical Therapy Daily Treatment Note  Date: 2023  Patient Name: Nena Floyd  : 1950   MRN: [id number]  Referring Provider: ALISON Chavis - CNP  [place]  [place],   [facility] Diagnosis: Parkinson's    Outcome Measure: RENE= 52    S: Patient reports no new complaints. O:  Approval   Time 0915-10:15 2x/week x 6 weeks (12)  + 2x/wk, 4 weeks   + 2x/wk, 4 weeks    Visit   2/8    Pain 0/10    ROM      Exercise     Nustep L6 x 8 min Seat 11, arms 11   Hamstring Curl alt 2# x 2 min     calf raises 2# x 2 min    Hip abd alt 2# x 2 min     Hip ext alt 2# x 2 min     March with AW 2# x  2 min      THERAPEUTIC ACTIVITIES Large, functional, dynamic, global movements used to build strength, balance, endurance, and flexibility and to improve physical performance. Step-ups - FWD Alt. 6\" x 2 min    Step-ups - LAT 6\" x 2 min, up & over    Step-ups - BWD     Alt lunges  2# x 2 min      NMR Feedback and cues necessary for developing neuromuscular control. Movement education and guided movement interventions  used to improve performance and control. To improve balance for safe community and home ambulation   March 4 x 20'    Side stepping 4 x 20'    Heel to toe 4 x 20'    Retro walk heel toe     Fwd with cones     Ball push with cane     A:  Tolerated fairly. With verbal cues to lift/march while turning patient gets less freezing of feet. P: Continue POC.  Request sent to physician  Romeo Marino PT    Treatment Charges: Mins Units   Initial Evaluation     Re-Evaluation     Ther Exercise         TE 40 2   Manual Therapy     MT     Ther Activities        TA 10 1   Gait Training          GT     Neuro Re-education NR 10 1   Modalities     Non-Billable Service Time     Other     Total Time/Units 60 4

## 2023-06-22 ENCOUNTER — HOSPITAL ENCOUNTER (OUTPATIENT)
Dept: PHYSICAL THERAPY | Age: 73
Setting detail: THERAPIES SERIES
Discharge: HOME | End: 2023-06-22
Payer: MEDICARE

## 2023-06-22 PROCEDURE — 97112 NEUROMUSCULAR REEDUCATION: CPT

## 2023-06-22 PROCEDURE — 97110 THERAPEUTIC EXERCISES: CPT

## 2023-06-22 PROCEDURE — 97530 THERAPEUTIC ACTIVITIES: CPT

## 2023-06-22 NOTE — PROGRESS NOTES
[facility]  Physical Therapy Daily Treatment Note  Date: 2023  Patient Name: Ton Hester  : 1950   MRN: [id number]  Referring Provider: ALISON Jeffries - CNP  [facility],  [address] [place] [address] Diagnosis: Parkinson's    Outcome Measure: RENE= 52    S: Patient reports doing a lot of walking earlier this week and is feeling stiff in his hips today. O: Discussed performing exercises with slow, controlled, deliberate movements. Time 8553-6847 2x/week x 6 weeks (12)  + 2x/wk, 4 weeks   + 2x/wk, 4 weeks    Visit   8/8  5/8    Pain 0/10    ROM      Exercise     Nustep L6 x 8 min Seat 11, arms 11   Hamstring Curl alt 2# x 2 min     calf raises 2# x 2 min    Hip abd alt 2# x 2 min     Hip ext alt 2# x 2 min     March with AW 2# x  2 min      THERAPEUTIC ACTIVITIES Large, functional, dynamic, global movements used to build strength, balance, endurance, and flexibility and to improve physical performance. Step-ups - FWD Alt. 6\" x 2 min    Step-ups - LAT 6\" x 2 min, up & over    Step-ups - BWD     Alt lunges  2# x 2 min      NMR Feedback and cues necessary for developing neuromuscular control. Movement education and guided movement interventions  used to improve performance and control. To improve balance for safe community and home ambulation   March 4 x 20'    Side stepping 4 x 20'    Heel to toe 4 x 20'    Retro walk heel toe     Fwd with cones     Ball push with cane     A:  Tolerated fairly. With verbal cues to lift/march while turning patient gets less freezing of feet. P: Continue POC.    Juan Jose Ochoa PTA    Treatment Charges: Mins Units   Initial Evaluation     Re-Evaluation     Ther Exercise         TE 36 2   Manual Therapy     MT     Ther Activities        TA 10 1   Gait Training          GT     Neuro Re-education NR 10 1   Modalities     Non-Billable Service Time     Other     Total Time/Units 56 4

## 2023-06-27 ENCOUNTER — HOSPITAL ENCOUNTER (OUTPATIENT)
Dept: PHYSICAL THERAPY | Age: 73
Setting detail: THERAPIES SERIES
Discharge: HOME | End: 2023-06-27
Payer: MEDICARE

## 2023-06-27 PROCEDURE — 97530 THERAPEUTIC ACTIVITIES: CPT | Performed by: PHYSICAL THERAPIST

## 2023-06-27 PROCEDURE — 97112 NEUROMUSCULAR REEDUCATION: CPT | Performed by: PHYSICAL THERAPIST

## 2023-06-27 PROCEDURE — 97110 THERAPEUTIC EXERCISES: CPT | Performed by: PHYSICAL THERAPIST

## 2023-06-29 ENCOUNTER — HOSPITAL ENCOUNTER (OUTPATIENT)
Dept: PHYSICAL THERAPY | Age: 73
Setting detail: THERAPIES SERIES
Discharge: HOME | End: 2023-06-29
Payer: MEDICARE

## 2023-06-29 PROCEDURE — 97530 THERAPEUTIC ACTIVITIES: CPT | Performed by: PHYSICAL THERAPIST

## 2023-06-29 PROCEDURE — 97110 THERAPEUTIC EXERCISES: CPT | Performed by: PHYSICAL THERAPIST

## 2023-06-29 PROCEDURE — 97112 NEUROMUSCULAR REEDUCATION: CPT | Performed by: PHYSICAL THERAPIST

## 2023-07-06 ENCOUNTER — HOSPITAL ENCOUNTER (OUTPATIENT)
Dept: PHYSICAL THERAPY | Age: 73
Setting detail: THERAPIES SERIES
Discharge: HOME | End: 2023-07-06
Payer: MEDICARE

## 2023-07-06 PROCEDURE — 97112 NEUROMUSCULAR REEDUCATION: CPT | Performed by: PHYSICAL THERAPIST

## 2023-07-06 PROCEDURE — 97110 THERAPEUTIC EXERCISES: CPT | Performed by: PHYSICAL THERAPIST

## 2023-07-06 PROCEDURE — 97530 THERAPEUTIC ACTIVITIES: CPT | Performed by: PHYSICAL THERAPIST

## 2023-07-06 NOTE — PROGRESS NOTES
[address] [facility]  [facility] Therapy Daily Treatment Note  Date: 2023  Patient Name: Ed Lorenzana  : 1950   MRN: [id number]  Referring Provider: ALISON Benz - CNP  [facility],  [address] [place] [address]     Medical Diagnosis: Parkinson's    Outcome Measure: RENE= 48    S: Patient reports  still feeling stiff in his hips today. O: Discussed performing exercises with slow, controlled, deliberate movements. Time 910-10:15 2x/week x 6 weeks (12)  + 2x/wk, 4 weeks   + 2x/wk, 4 weeks    Visit   8  8/8    Pain 0/10    ROM      Exercise     Nustep L6 x 8 min Seat 11, arms 11   Hamstring Curl alt 2# x 2 min     calf raises 2# x 2 min    Hip abd alt 2# x 2 min     Hip ext alt 2# x 2 min     March with AW 2# x  2 min      THERAPEUTIC ACTIVITIES Large, functional, dynamic, global movements used to build strength, balance, endurance, and flexibility and to improve physical performance. Step-ups - FWD Alt. 6\" x 2 min    Step-ups - LAT 6\" x 2 min, up & over    Step-ups - BWD     Alt lunges  2# x 2 min      NMR Feedback and cues necessary for developing neuromuscular control. Movement education and guided movement interventions  used to improve performance and control. To improve balance for safe community and home ambulation   March 4 x 20'    Side stepping 4 x 20'    Heel to toe 4 x 20'    Retro walk heel toe     Fwd with cones     Ball push with cane     A:  Tolerated fairly. See discharge note. With verbal cues to lift/march while turning patient gets less freezing of feet. P: Pt is discharged from PT at this time.   Jeremiah Linkchester, PT    Treatment Charges: Mins Units   Initial Evaluation     Re-Evaluation     Ther Exercise         TE 45 2   Manual Therapy     MT     Ther Activities        TA 10 1   Gait Training          GT     Neuro Re-education NR 10 1   Modalities     Non-Billable Service Time     Other     Total Time/Units 65 4

## 2023-07-06 NOTE — PROGRESS NOTES
PT PROGRESS REPORT      PATIENT: Amparo Mccann   Date: 6/01/2023  DIAGNOSIS:  Parkinson's  PHYSICIAN:  Pranav Bowers, APRN - CNP  [address] [facility],  [address] [place] [address]     ATTENDANCE:  28  OUT OF 28 APPOINTMENT FROM  3/13/2023 TO 7/06/2023  TREATMENTS RECEIVED:  THEREX, GAIT, BALANCE, HEP     INITIAL PROBLEM 4/20/2023 6/01/2023 CURRENT          RENE BALANCE TEST= 47 49 49 50                       DECREASED STRENGTH RLE      HIP              Flex= 4/5              Abd= 4/5              Add= 4/5  Knee    flex = 4/5    / =  4/5  Ankle    pf=      4/5    df=     4/5              HIP              Flex= 4+/5              Abd= 4/5              Add= 4/5  Knee    flex = 4+/5    / =  4+/5  Ankle    pf=      4+/5    df=     4/5        HIP              Flex= 5-/5              Abd= 5-/5              Add= 5-/5  Knee    flex = 4+/5    / =  5-/5  Ankle    pf=     5/5    df=    4+/5     HIP              Flex= 5-/5              Abd= 5-/5              Add= 5-/5  Knee    flex = 5-/5    / =  5-/5  Ankle    pf=     5/5    df=    5-/5    DECREASED STRENGTH LLE      HIP              Flex= 4/5              Abd= 4/5              Add= 4/5  Knee    flex = 4/5    / =  4/5  Ankle    pf=      4/5    df=     4/5        HIP              Flex= 4+/5              Abd= 4/5              Add= 4/5  Knee    flex = 4+/5    / =  4+/5  Ankle    pf=      4+/5    df=     4/5     HIP              Flex5-/5              Abd= 5-/5              Add= 5-/5  Knee    flex = 5-/5    / =  5-/5  Ankle    pf=     5-/5    df=     4+/5    HIP              Flex5-/5              Abd= 5-/5              Add= 5-/5  Knee    flex = 5-/5    / =  5-/5  Ankle    pf=     5-/5    df=     5-/5     COMMENTS/ RECOMMENDATIONS: Pt has progressed slowly with strength and balance. Pt  encouraged to stay active and work on HEP. Pt is discharged from PT at this time.       THANK YOU FOR THE OPPORTUNITY TO WORK WITH WITH THIS PATIENT  ABIMBOLA PERRY PT            IF YOU HAVE ANY QUESTIONS OR

## 2023-09-27 ENCOUNTER — OFFICE VISIT (OUTPATIENT)
Dept: NEUROLOGY | Age: 73
End: 2023-09-27
Payer: MEDICARE

## 2023-09-27 VITALS
WEIGHT: 167 LBS | OXYGEN SATURATION: 94 % | TEMPERATURE: 97.4 F | HEART RATE: 52 BPM | SYSTOLIC BLOOD PRESSURE: 112 MMHG | DIASTOLIC BLOOD PRESSURE: 72 MMHG | BODY MASS INDEX: 23.96 KG/M2

## 2023-09-27 DIAGNOSIS — G20.A1 PARKINSON'S DISEASE: Primary | ICD-10-CM

## 2023-09-27 PROCEDURE — 1123F ACP DISCUSS/DSCN MKR DOCD: CPT | Performed by: NURSE PRACTITIONER

## 2023-09-27 PROCEDURE — 99214 OFFICE O/P EST MOD 30 MIN: CPT | Performed by: NURSE PRACTITIONER

## 2023-09-27 PROCEDURE — G8420 CALC BMI NORM PARAMETERS: HCPCS | Performed by: NURSE PRACTITIONER

## 2023-09-27 PROCEDURE — 4004F PT TOBACCO SCREEN RCVD TLK: CPT | Performed by: NURSE PRACTITIONER

## 2023-09-27 PROCEDURE — G8427 DOCREV CUR MEDS BY ELIG CLIN: HCPCS | Performed by: NURSE PRACTITIONER

## 2023-09-27 PROCEDURE — 3017F COLORECTAL CA SCREEN DOC REV: CPT | Performed by: NURSE PRACTITIONER

## 2023-09-27 RX ORDER — ROPINIROLE 0.5 MG/1
1 TABLET, FILM COATED ORAL 3 TIMES DAILY
Qty: 270 TABLET | Refills: 3
Start: 2023-09-27

## 2023-09-27 NOTE — PATIENT INSTRUCTIONS
[facility]  Parkinson's disease support group [facility]  2nd Thurs month at 2pm: [phone]    2. Young adult PD support group [facility]: 2nd tues month 7pm: [phone]    3. [facility]  Delay the Disease Program :[phone]    4. [facility]  Delay the Disease Program    5. [employer] [facility] [place] [facility]--2nd Tues of month 1p-3p       EXERCISE:  [address] program  Virtual and online exercise classes (chair aerobics too): [phone]    2. Moving Forward (group exercise designed by PT)   [facility]: [email or web address]   Challenge performance [email or web address]
.

## 2023-10-18 ENCOUNTER — HOSPITAL ENCOUNTER (EMERGENCY)
Age: 73
Discharge: HOME | End: 2023-10-18
Attending: EMERGENCY MEDICINE
Payer: MEDICARE

## 2023-10-18 ENCOUNTER — APPOINTMENT (OUTPATIENT)
Dept: CT IMAGING | Age: 73
End: 2023-10-18
Payer: MEDICARE

## 2023-10-18 VITALS
HEART RATE: 53 BPM | SYSTOLIC BLOOD PRESSURE: 118 MMHG | DIASTOLIC BLOOD PRESSURE: 74 MMHG | OXYGEN SATURATION: 95 % | WEIGHT: 170 LBS | BODY MASS INDEX: 24.39 KG/M2 | RESPIRATION RATE: 16 BRPM | TEMPERATURE: 98.3 F

## 2023-10-18 DIAGNOSIS — S01.81XA LACERATION OF FOREHEAD, INITIAL ENCOUNTER: ICD-10-CM

## 2023-10-18 DIAGNOSIS — S09.90XA INJURY OF HEAD, INITIAL ENCOUNTER: Primary | ICD-10-CM

## 2023-10-18 LAB
INR PPP: 2.6
PROTHROMBIN TIME: 29.7 SEC (ref 9.3–12.4)

## 2023-10-18 PROCEDURE — 12013 RPR F/E/E/N/L/M 2.6-5.0 CM: CPT

## 2023-10-18 PROCEDURE — 90471 IMMUNIZATION ADMIN: CPT | Performed by: EMERGENCY MEDICINE

## 2023-10-18 PROCEDURE — 70450 CT HEAD/BRAIN W/O DYE: CPT

## 2023-10-18 PROCEDURE — 72125 CT NECK SPINE W/O DYE: CPT

## 2023-10-18 PROCEDURE — 6360000002 HC RX W HCPCS: Performed by: EMERGENCY MEDICINE

## 2023-10-18 PROCEDURE — 85610 PROTHROMBIN TIME: CPT

## 2023-10-18 PROCEDURE — 70486 CT MAXILLOFACIAL W/O DYE: CPT

## 2023-10-18 PROCEDURE — 2500000003 HC RX 250 WO HCPCS: Performed by: EMERGENCY MEDICINE

## 2023-10-18 PROCEDURE — 90714 TD VACC NO PRESV 7 YRS+ IM: CPT | Performed by: EMERGENCY MEDICINE

## 2023-10-18 PROCEDURE — 99284 EMERGENCY DEPT VISIT MOD MDM: CPT

## 2023-10-18 RX ORDER — LIDOCAINE HYDROCHLORIDE AND EPINEPHRINE 10; 10 MG/ML; UG/ML
20 INJECTION, SOLUTION INFILTRATION; PERINEURAL ONCE
Status: COMPLETED | OUTPATIENT
Start: 2023-10-18 | End: 2023-10-18

## 2023-10-18 RX ADMIN — LIDOCAINE HYDROCHLORIDE,EPINEPHRINE BITARTRATE 4 ML: 10; .01 INJECTION, SOLUTION INFILTRATION; PERINEURAL at 20:43

## 2023-10-18 RX ADMIN — CLOSTRIDIUM TETANI TOXOID ANTIGEN (FORMALDEHYDE INACTIVATED) AND CORYNEBACTERIUM DIPHTHERIAE TOXOID ANTIGEN (FORMALDEHYDE INACTIVATED) 0.5 ML: 5; 2 INJECTION, SUSPENSION INTRAMUSCULAR at 18:26

## 2023-10-18 ASSESSMENT — PAIN SCALES - GENERAL
PAINLEVEL_OUTOF10: 3
PAINLEVEL_OUTOF10: 5

## 2023-10-18 ASSESSMENT — ENCOUNTER SYMPTOMS
SHORTNESS OF BREATH: 0
CHEST TIGHTNESS: 0

## 2023-10-18 ASSESSMENT — LIFESTYLE VARIABLES: HOW MANY STANDARD DRINKS CONTAINING ALCOHOL DO YOU HAVE ON A TYPICAL DAY: PATIENT DOES NOT DRINK

## 2023-10-18 ASSESSMENT — PAIN DESCRIPTION - LOCATION: LOCATION: HEAD;NOSE

## 2023-10-18 ASSESSMENT — PAIN - FUNCTIONAL ASSESSMENT: PAIN_FUNCTIONAL_ASSESSMENT: 0-10

## 2023-10-18 NOTE — ED NOTES
LACERATION REPAIR  PROCEDURE NOTE:  Unless otherwise indicated, this procedure was done or directly supervised by the ED attending. Performed By: Pattie Burton PA-C. Laceration #: 1. Location: L eyebrow   Length: 4cm. The wound area was cleansed with povidone iodine, and draped in a sterile fashion. The wound area was anesthetized with Lidocaine 1% with epinephrine. WOUND COMPLEXITY:    Debridement: None. Undermining: None. Wound Margins Revised: yes. Flaps Aligned: yes. The wound was explored with the following results no foreign body or tendon injury seen. The wound was closed with 4-0 Ethilon using interrupted sutures. Dressing:  bacitracin was placed.     Total number suture: 4       Merle Ji PA-C  10/18/23 2992

## 2023-10-18 NOTE — ED PROVIDER NOTES
calling their office tomorrow. --------------------------------- ADDITIONAL PROVIDER NOTES ---------------------------------  At this time the patient is without objective evidence of an acute process requiring hospitalization or inpatient management. They have remained hemodynamically stable throughout their entire ED visit and are stable for discharge with outpatient follow-up. The plan has been discussed in detail and they are aware of the specific conditions for emergent return, as well as the importance of follow-up. Discharge Medication List as of 10/18/2023  8:48 PM          Diagnosis:  1. Injury of head, initial encounter    2. Laceration of forehead, initial encounter        Disposition:  Patient's disposition: Discharge to home  Patient's condition is stable.                  Richard Willson DO  10/18/23 0819

## 2023-10-26 NOTE — PROGRESS NOTES
[facility]  Physical Therapy Daily Treatment Note  Date: 10/26/2023  Patient Name: Nicolas Schuster  : 1950   MRN: [id number]  Referring Provider: ALISON Carnes - CNP  [address]  [address] [facility]     Medical Diagnosis: Parkinson's    Outcome Measure: RENE= 52    S: See eval. Limited therex today due to right knee pain since fall. O:     Time 1000-10:45 2x/week x 6 weeks (12)      Visit     Pain 0/10    ROM      Exercise     Nustep L5 x 8 min Seat 11, arms 11   Hamstring Curl alt      calf raises     Hip abd alt     Hip ext     March with AW     THERAPEUTIC ACTIVITIES Large, functional, dynamic, global movements used to build strength, balance, endurance, and flexibility and to improve physical performance. Step-ups - FWD Alt. 4\" x 1.5 min    Step-ups - LAT 4\" x 1.5 min L Left only due to right knee pain. Step-ups - BWD     Alt lunges       NMR Feedback and cues necessary for developing neuromuscular control. Movement education and guided movement interventions  used to improve performance and control. To improve balance for safe community and home ambulation   March      Side stepping     Retro walk heel toe     Heel to toe     Fwd with cones     Ball push with cane     A:  Tolerated well. P: Continue POC.   Marina Hutton, PT    Treatment Charges: Mins Units   Initial Evaluation 37  1   Re-Evaluation     Ther Exercise         TE  8 1    Manual Therapy     MT     Ther Activities        TA     Gait Training          GT     Neuro Re-education NR     Modalities     Non-Billable Service Time     Other     Total Time/Units 45 2

## 2023-10-26 NOTE — PROGRESS NOTES
[facility] OUTPATIENT REHABILITATION  PHYSICAL THERAPY INITIAL EVALUATION         Date:  10/27/2023   Patient: Shelley Ly  : 1950  MRN: [id number]  Referring Provider: ALISON Brennan - CNP  [address] [facility],  [address] [place] [address]     Medical Diagnosis: Parkinson's Disease  Onset date: 12 months   Mechanism of Injury: Insidious onset  Chief complaint: Difficulty initiating gait, balance issues . Recent fall with visit to ER for laceration on left eye brow. More LOB than prior. SUBJECTIVE:     Past Medical History  Past Medical History:   Diagnosis Date    A-fib ([address])     Alcoholism ([address])     HTN (hypertension)     Parkinson's disease ([address])      Past Surgical History:   Procedure Laterality Date    INGUINAL HERNIA REPAIR Left     as a baby    KNEE SURGERY Right        Medications:   Current Outpatient Medications   Medication Sig Dispense Refill    rOPINIRole (REQUIP) 0.5 MG tablet Take 2 tablets by mouth 3 times daily 270 tablet 3    carbidopa-levodopa (SINEMET)  MG per tablet Take 2 tablets by mouth every 4 hours (while awake) 720 tablet 3    Vitamin D (CHOLECALCIFEROL) 25 MCG (1000 UT) TABS tablet Take 1 tablet by mouth daily      Cholecalciferol (VITAMIN D3) 50 MCG (2000 UT) CAPS Take 1 capsule by mouth daily      warfarin (COUMADIN) 5 MG tablet 2.5 mg on  and 5 mg Monday through Saturday      Verapamil HCl  MG CP24 Take 1 capsule by mouth daily       No current facility-administered medications for this encounter. Imaging results: MRI BRAIN WO CONTRAST    Result Date: 2021  EXAMINATION: MRI OF THE BRAIN WITHOUT CONTRAST  2021 8:31 am TECHNIQUE: Multiplanar multisequence MRI of the brain was performed without the administration of intravenous contrast. COMPARISON: None.  HISTORY: ORDERING SYSTEM PROVIDED HISTORY: Parkinson's disease [facility]) TECHNOLOGIST PROVIDED HISTORY: Reason for exam:->parkinson's disease FINDINGS: INTRACRANIAL

## 2023-10-27 ENCOUNTER — HOSPITAL ENCOUNTER (OUTPATIENT)
Dept: PHYSICAL THERAPY | Age: 73
Setting detail: THERAPIES SERIES
Discharge: HOME | End: 2023-10-27
Payer: MEDICARE

## 2023-10-27 PROCEDURE — 97162 PT EVAL MOD COMPLEX 30 MIN: CPT | Performed by: PHYSICAL THERAPIST

## 2023-10-27 PROCEDURE — 97110 THERAPEUTIC EXERCISES: CPT | Performed by: PHYSICAL THERAPIST

## 2023-10-30 ENCOUNTER — HOSPITAL ENCOUNTER (OUTPATIENT)
Dept: PHYSICAL THERAPY | Age: 73
Setting detail: THERAPIES SERIES
Discharge: HOME | End: 2023-10-30
Payer: MEDICARE

## 2023-10-30 PROCEDURE — 97110 THERAPEUTIC EXERCISES: CPT | Performed by: PHYSICAL THERAPIST

## 2023-10-30 NOTE — PROGRESS NOTES
[address] [facility]  Physical Therapy Daily Treatment Note  Date: 10/30/2023  Patient Name: Ruchi Rodas  : 1950   MRN: [id number]  Referring Provider: Katie Madrigal, APRN - CNP  [address]  [address] [facility]     Medical Diagnosis: Parkinson's    Outcome Measure: RENE= 52    S:   Limited therex today due to right knee pain since fall which continues the same. Requests PT on hold until knee pain resolves. O:   Pt encouraged to see PCP regarding knee pain. Time 1818-8563 2x/week x 6 weeks (12)      Visit     Pain 0/10    ROM      Exercise     Nustep L6 x 10 min Seat 11, arms 11   Hamstring Curl alt      calf raises     Hip abd alt     Hip ext     March with AW     THERAPEUTIC ACTIVITIES Large, functional, dynamic, global movements used to build strength, balance, endurance, and flexibility and to improve physical performance. Step-ups - FWD      Step-ups - LAT  Left only due to right knee pain. Step-ups - BWD     Alt lunges       NMR Feedback and cues necessary for developing neuromuscular control. Movement education and guided movement interventions  used to improve performance and control. To improve balance for safe community and home ambulation   March      Side stepping     Retro walk heel toe     Heel to toe     Fwd with cones     Ball push with cane     A:  Tolerated poorly due to right knee pain  P: Continue POC when pt able to participate fully.   Fausto Maciel PT    Treatment Charges: Mins Units   Initial Evaluation      Re-Evaluation     Ther Exercise         TE  10 1    Manual Therapy     MT     Ther Activities        TA     Gait Training          GT     Neuro Re-education NR     Modalities     Non-Billable Service Time     Other     Total Time/Units  10 1

## 2023-11-28 ENCOUNTER — HOSPITAL ENCOUNTER (OUTPATIENT)
Dept: PHYSICAL THERAPY | Age: 73
Setting detail: THERAPIES SERIES
Discharge: HOME | End: 2023-11-28
Payer: MEDICARE

## 2023-11-28 PROCEDURE — 97110 THERAPEUTIC EXERCISES: CPT | Performed by: PHYSICAL THERAPIST

## 2023-11-28 NOTE — PROGRESS NOTES
[address] [facility]  [facility] Daily Treatment Note  Date: 2023  Patient Name: Ruchi Rodas  : 1950   MRN: [id number]  Referring Provider: Katie Madrigal, APRN - CNP  [address] [facility],  [address] [place] [address]     Medical Diagnosis: Parkinson's    Outcome Measure: RENE= 52    S:   Pt returned after taking time off after fell that injured knee. Knee is feeling much better. O:     Time 0935-10:28 2x/week x 6 weeks (12)      Visit 3/12    Pain 0/10    ROM      Exercise     Nustep L6 x 10 min Seat 11, arms 11   Hamstring Curl alt      calf raises 2# x 1.5 min    Hip abd alt 2# x 2 min    Hip ext 2# x 2 min    March with AW 2# x 2 min    THERAPEUTIC ACTIVITIES Large, functional, dynamic, global movements used to build strength, balance, endurance, and flexibility and to improve physical performance. Step-ups - FWD 4\" x 2 min     Step-ups - LAT up and over 4\" x 2 min Left only due to right knee pain. Step-ups - BWD     Alt lunges 2# x 2 min      NMR Feedback and cues necessary for developing neuromuscular control. Movement education and guided movement interventions  used to improve performance and control. To improve balance for safe community and home ambulation   March      Side stepping     Retro walk heel toe     Heel to toe     Fwd with cones     Ball push with cane     A:  Tolerated fair, increased incidence of freezing with standing therex. P: Continue POC, progress as tolerated.   Fausto Maciel, PT    Treatment Charges: Mins Units   Initial Evaluation      Re-Evaluation     Ther Exercise           45  3   Manual Therapy          Ther Activities          6 0   Gait Training               Neuro Re-education       Modalities     Non-Billable Service Time     Other     Total Time/Units 53 3

## 2023-11-30 ENCOUNTER — HOSPITAL ENCOUNTER (OUTPATIENT)
Dept: PHYSICAL THERAPY | Age: 73
Setting detail: THERAPIES SERIES
Discharge: HOME | End: 2023-11-30
Payer: MEDICARE

## 2023-11-30 PROCEDURE — 97530 THERAPEUTIC ACTIVITIES: CPT | Performed by: PHYSICAL THERAPIST

## 2023-11-30 PROCEDURE — 97110 THERAPEUTIC EXERCISES: CPT | Performed by: PHYSICAL THERAPIST

## 2023-11-30 NOTE — PROGRESS NOTES
[address] [facility]  Physical Therapy Daily Treatment Note  Date: 2023  Patient Name: Aiden Epperson  : 1950   MRN: [id number]  Referring Provider: Julita Cabral, APRN - CNP  [address] [facility],  [address] [place] [address]     Medical Diagnosis: Parkinson's    Outcome Measure: RENE= 52    S:   Pt returned after taking time off after fell that injured knee. Knee is feeling much better. O:   Started standing balance tasks today. Time 5198-8503 2x/week x 6 weeks (12)      Visit     Pain 0/10    ROM      Exercise     Nustep L6 x 10 min Seat 11, arms 11   Hamstring Curl alt      calf raises 2# x 1.5 min    Hip abd alt 2# x 2 min    Hip ext 2# x 2 min    March with AW 2# x 2 min    THERAPEUTIC ACTIVITIES Large, functional, dynamic, global movements used to build strength, balance, endurance, and flexibility and to improve physical performance. Step-ups - FWD 4\" x 2 min     Step-ups - LAT up and over 4\" x 2 min Left only due to right knee pain. Step-ups - BWD     Alt lunges 2# x 2 min      NMR Feedback and cues necessary for developing neuromuscular control. Movement education and guided movement interventions  used to improve performance and control. To improve balance for safe community and home ambulation   March 2x 30'     Side stepping 2x 30'    Retro walk heel toe     Heel to toe     Fwd with cones     Ball push with cane     A:  Tolerated fair, increased incidence of freezing with standing therex. P: Continue POC, progress as tolerated.   Cecilia Shoemaker PT    Treatment Charges: Mins Units   Initial Evaluation      Re-Evaluation     Ther Exercise           40 2   Manual Therapy          Ther Activities          10 1   Gait Training               Neuro Re-education       Modalities     Non-Billable Service Time     Other     Total Time/Units 50 3

## 2023-12-07 ENCOUNTER — HOSPITAL ENCOUNTER (OUTPATIENT)
Dept: PHYSICAL THERAPY | Age: 73
Setting detail: THERAPIES SERIES
Discharge: HOME | End: 2023-12-07
Payer: MEDICARE

## 2023-12-07 PROCEDURE — 97110 THERAPEUTIC EXERCISES: CPT | Performed by: PHYSICAL THERAPIST

## 2023-12-07 PROCEDURE — 97530 THERAPEUTIC ACTIVITIES: CPT | Performed by: PHYSICAL THERAPIST

## 2023-12-07 NOTE — PROGRESS NOTES
[id number] [facility]  Physical Therapy Daily Treatment Note  Date: 2023  Patient Name: Michael Mooney  : 1950   MRN: [id number]  Referring Provider: ALISON Benitez - CNP  [address] [facility],  [address] [place] [address]     Medical Diagnosis: Parkinson's    Outcome Measure: RENE= 52    S:   Pt returned after taking time off after fell that injured knee. Knee is feeling much better. O:   Started standing balance tasks today. Time 2728-0763 2x/week x 6 weeks (12)      Visit     Pain 0/10    ROM      Exercise     Nustep L6 x 10 min Seat 11, arms 11   Hamstring Curl alt      calf raises 2# x 1.5 min    Hip abd alt 2# x 2 min    Hip ext 2# x 2 min    March with AW 2# x 2 min    THERAPEUTIC ACTIVITIES Large, functional, dynamic, global movements used to build strength, balance, endurance, and flexibility and to improve physical performance. Step-ups - FWD 4\" x 2 min     Step-ups - LAT up and over 4\" x 2 min Left only due to right knee pain. Step-ups - BWD     Alt lunges 2# x 2 min      NMR Feedback and cues necessary for developing neuromuscular control. Movement education and guided movement interventions  used to improve performance and control. To improve balance for safe community and home ambulation   March 2x 30'     Side stepping 2x 30'    Retro walk heel toe     Heel to toe     Fwd with cones     Ball push with cane     A:  Tolerated fair, increased incidence of freezing with standing therex. P: Continue POC, progress as tolerated.   Rivas Santos, PT    Treatment Charges: Mins Units   Initial Evaluation      Re-Evaluation     Ther Exercise           50 3   Manual Therapy          Ther Activities          10 1   Gait Training               Neuro Re-education       Modalities     Non-Billable Service Time     Other     Total Time/Units 60 4

## 2023-12-14 ENCOUNTER — HOSPITAL ENCOUNTER (OUTPATIENT)
Dept: PHYSICAL THERAPY | Age: 73
Setting detail: THERAPIES SERIES
Discharge: HOME | End: 2023-12-14
Payer: MEDICARE

## 2023-12-14 PROCEDURE — 97110 THERAPEUTIC EXERCISES: CPT | Performed by: PHYSICAL THERAPIST

## 2023-12-14 PROCEDURE — 97530 THERAPEUTIC ACTIVITIES: CPT | Performed by: PHYSICAL THERAPIST

## 2023-12-14 NOTE — PROGRESS NOTES
[id number] [facility]  [facility] Daily Treatment Note  Date: 2023  Patient Name: Megan Ca  : 1950   MRN: [id number]  Referring Provider: Jesús Patel, APRN - CNP  147 [facility],  [address] [place] [address]     Medical Diagnosis: Parkinson's    Outcome Measure: RENE= 52    S:   Pt returned after taking time off after fell that injured knee. Knee is feeling much better. O:   Started standing balance tasks today. Time 920-10:10 2x/week x 6 weeks (12)      Visit     Pain 0/10    ROM      Exercise     Nustep L6 x 10 min Seat 11, arms 11   Hamstring Curl alt      calf raises 2# x 1.5 min    Hip abd alt 2# x 2 min    Hip ext 2# x 2 min    March with AW 2# x 2 min    THERAPEUTIC ACTIVITIES Large, functional, dynamic, global movements used to build strength, balance, endurance, and flexibility and to improve physical performance. Step-ups - FWD 6\" x 2 min     Step-ups - LAT up and over 6\" x 2 min Left only due to right knee pain. Step-ups - BWD     Alt lunges 2# x 2 min      NMR Feedback and cues necessary for developing neuromuscular control. Movement education and guided movement interventions  used to improve performance and control. To improve balance for safe community and home ambulation   March 2x 30'     Side stepping 2x 30'    Retro walk heel toe     Heel to toe     Fwd with cones     Ball push with cane     A:  Tolerated fair, increased incidence of freezing with lateral walk to left. P: Continue POC, progress as tolerated.   Bard Gavin PT    Treatment Charges: Mins Units   Initial Evaluation      Re-Evaluation     Ther Exercise           42 2   Manual Therapy          Ther Activities          8 1   Gait Training               Neuro Re-education       Modalities     Non-Billable Service Time     Other     Total Time/Units 50 3

## 2023-12-28 ENCOUNTER — HOSPITAL ENCOUNTER (OUTPATIENT)
Dept: PHYSICAL THERAPY | Age: 73
Setting detail: THERAPIES SERIES
Discharge: HOME | End: 2023-12-28
Payer: MEDICARE

## 2023-12-28 PROCEDURE — 97110 THERAPEUTIC EXERCISES: CPT

## 2023-12-28 PROCEDURE — 97530 THERAPEUTIC ACTIVITIES: CPT

## 2023-12-28 NOTE — PROGRESS NOTES
[facility]  Physical Therapy Daily Treatment Note  Date: 2023  Patient Name: Samreen Jimenez  : 1950   MRN: [id number]  Referring Provider: Vickie Ugarte, APRN - CNP  147 [facility],  [address] [place] [address]     Medical Diagnosis: Parkinson's    Outcome Measure: RENE= 52    S:   Pt reports no new complaints. He has good days and bad. O:     Time 9370-6692 2x/week x 6 weeks (12)      Visit     Pain 0/10    ROM      Exercise     Nustep L6 x 10 min Seat 11, arms 11   Hamstring Curl alt 2# x 2 min     calf raises 2# x 1.5 min    Hip abd alt 2# x 2 min    Hip ext 2# x 2 min    March with AW 2# x 2 min    THERAPEUTIC ACTIVITIES Large, functional, dynamic, global movements used to build strength, balance, endurance, and flexibility and to improve physical performance. Step-ups - FWD 6\" x 2 min     Step-ups - LAT up and over 6\" x 2 min    Step-ups - BWD     Alt lunges 2# x 2 min      NMR Feedback and cues necessary for developing neuromuscular control. Movement education and guided movement interventions  used to improve performance and control. To improve balance for safe community and home ambulation   March 2x 30'     Side stepping 2x 30'    Retro walk heel toe     Heel to toe     Fwd with cones     Ball push with cane     A:  Tolerated fair, freezing with lateral walk to left. P: Continue POC, progress as tolerated.   Kimani Kellogg, JAZZMINE    Treatment Charges: Mins Units   Initial Evaluation      Re-Evaluation     Ther Exercise           20 1   Manual Therapy          Ther Activities          8 1   Gait Training               Neuro Re-education       Modalities     Non-Billable Service Time 28 0   Other     Total Time/Units 56 2

## 2024-01-02 ENCOUNTER — HOSPITAL ENCOUNTER (OUTPATIENT)
Dept: PHYSICAL THERAPY | Age: 74
Setting detail: THERAPIES SERIES
Discharge: HOME | End: 2024-01-02
Payer: MEDICARE

## 2024-01-02 PROCEDURE — 97530 THERAPEUTIC ACTIVITIES: CPT | Performed by: PHYSICAL THERAPIST

## 2024-01-02 PROCEDURE — 97110 THERAPEUTIC EXERCISES: CPT | Performed by: PHYSICAL THERAPIST

## 2024-01-02 NOTE — PROGRESS NOTES
[facility]  Physical Therapy Daily Treatment Note  Date: 2024  Patient Name: Randy Ca  : 1950   MRN: [id number]  Referring Provider: David Bull APRN - CNP  [address] [place] [address]     Medical Diagnosis: Parkinson's    Outcome Measure: RENE= 49    S:   Pt reports no new complaints. He has good days and bad.  O:     Time 8967-2139 2x/week x 6 weeks (12)      Visit 10/12    Pain 0/10    ROM      Exercise     Nustep L6 x 10 min Seat 11, arms 11   Hamstring Curl alt 2# x 2 min     calf raises 2# x 1.5 min    Hip abd alt 2# x 2 min    Hip ext 2# x 2 min    March with AW 2# x 2 min    THERAPEUTIC ACTIVITIES Large, functional, dynamic, global movements used to build strength, balance, endurance, and flexibility and to improve physical performance.     Step-ups - FWD 6\" x 2 min     Step-ups - LAT up and over 6\" x 2 min    Step-ups - BWD     Alt lunges 2# x 2 min      NMR Feedback and cues necessary for developing neuromuscular control.  Movement education and guided movement interventions  used to improve performance and control.  To improve balance for safe community and home ambulation   March 2x 30'     Side stepping 2x 30'    Retro walk heel toe     Heel to toe     Fwd with cones     Ball push with cane     A:  Tolerated fair, freezing with lateral walk to left.  P: Continue POC, progress as tolerated.  Clay Payne, PT    Treatment Charges: Mins Units   Initial Evaluation      Re-Evaluation     Ther Exercise           20 1   Manual Therapy          Ther Activities          10 1   Gait Training               Neuro Re-education       Modalities     Non-Billable Service Time 23 0   Other     Total Time/Units 53 2

## 2024-01-09 ENCOUNTER — HOSPITAL ENCOUNTER (OUTPATIENT)
Dept: PHYSICAL THERAPY | Age: 74
Setting detail: THERAPIES SERIES
Discharge: HOME | End: 2024-01-09
Payer: MEDICARE

## 2024-01-09 PROCEDURE — 97110 THERAPEUTIC EXERCISES: CPT | Performed by: PHYSICAL THERAPIST

## 2024-01-09 PROCEDURE — 97530 THERAPEUTIC ACTIVITIES: CPT | Performed by: PHYSICAL THERAPIST

## 2024-01-09 NOTE — PROGRESS NOTES
[facility]  Physical Therapy Daily Treatment Note  Date: 2024  Patient Name: Randy Ca  : 1950   MRN: [id number]  Referring Provider: David Bull APRN - CNP  [address] [place] [address]     Medical Diagnosis: Parkinson's    Outcome Measure: RENE= 49    S:   Pt reports no new complaints. He has good days and bad.  O:     Time 1020-11:12 2x/week x 6 weeks (12)      Visit     Pain 0/10    ROM      Exercise     Nustep L6 x 10 min Seat 11, arms 11   Hamstring Curl alt 2# x 2 min     calf raises 2# x 1.5 min    Hip abd alt 2# x 2 min    Hip ext 2# x 2 min    March with AW 2# x 2 min    THERAPEUTIC ACTIVITIES Large, functional, dynamic, global movements used to build strength, balance, endurance, and flexibility and to improve physical performance.     Step-ups - FWD 6\" x 2 min     Step-ups - LAT up and over 6\" x 2 min    Step-ups - BWD     Alt lunges 2# x 2 min      NMR Feedback and cues necessary for developing neuromuscular control.  Movement education and guided movement interventions  used to improve performance and control.  To improve balance for safe community and home ambulation   March 2x 30'     Side stepping 2x 30'    Retro walk heel toe     Heel to toe     Fwd with cones     Ball push with cane     A:  Tolerated fair, freezing with lateral walk to left.  P: Continue POC, progress as tolerated.  Clay Payne, PT    Treatment Charges: Mins Units   Initial Evaluation      Re-Evaluation     Ther Exercise           25 2   Manual Therapy          Ther Activities          15 1   Gait Training               Neuro Re-education       Modalities     Non-Billable Service Time 12 0   Other     Total Time/Units 52 3

## 2024-01-11 ENCOUNTER — HOSPITAL ENCOUNTER (OUTPATIENT)
Dept: PHYSICAL THERAPY | Age: 74
Setting detail: THERAPIES SERIES
Discharge: HOME | End: 2024-01-11
Payer: MEDICARE

## 2024-01-11 PROCEDURE — 97530 THERAPEUTIC ACTIVITIES: CPT | Performed by: PHYSICAL THERAPIST

## 2024-01-11 PROCEDURE — 97110 THERAPEUTIC EXERCISES: CPT | Performed by: PHYSICAL THERAPIST

## 2024-01-11 NOTE — PROGRESS NOTES
[facility]  Physical Therapy Daily Treatment Note  Date: 2024  Patient Name: Randy Ca  : 1950   MRN: [id number]  Referring Provider: David Bull APRN - CNP  [address] [place] [address]     Medical Diagnosis: Parkinson's    Outcome Measure: RENE= 49    S:   Pt reports no new complaints. He has good days and bad.  O:     Time 1030-11:25 2x/week x 6 weeks (12)      Visit     Pain 0/10    ROM      Exercise     Nustep L6 x 10 min Seat 11, arms 11   Hamstring Curl alt 2# x 2 min     calf raises 2# x 1.5 min    Hip abd alt 2# x 2 min    Hip ext 2# x 2 min    March with AW 2# x 2 min    THERAPEUTIC ACTIVITIES Large, functional, dynamic, global movements used to build strength, balance, endurance, and flexibility and to improve physical performance.     Step-ups - FWD 6\" x 2 min     Step-ups - LAT up and over 6\" x 2 min    Step-ups - BWD     Alt lunges 2# x 2 min      NMR Feedback and cues necessary for developing neuromuscular control.  Movement education and guided movement interventions  used to improve performance and control.  To improve balance for safe community and home ambulation   March 2x 30'     Side stepping 2x 30'    Retro walk heel toe     Heel to toe     Fwd with cones     Ball push with cane     A:  Tolerated fair, freezing with lateral walk to left. See progress note.  P: Continue POC, progress as tolerated. Requested more visits.  Clay Payne PT    Treatment Charges: Mins Units   Initial Evaluation      Re-Evaluation     Ther Exercise           30 2   Manual Therapy          Ther Activities          25 2   Gait Training               Neuro Re-education       Modalities     Non-Billable Service Time   0   Other     Total Time/Units 55 4     
[facility] [id number]  FAX : [phone]  PHONE: [phone]

## 2024-01-16 ENCOUNTER — HOSPITAL ENCOUNTER (OUTPATIENT)
Dept: PHYSICAL THERAPY | Age: 74
Setting detail: THERAPIES SERIES
Discharge: HOME | End: 2024-01-16
Payer: MEDICARE

## 2024-01-16 PROCEDURE — 97110 THERAPEUTIC EXERCISES: CPT | Performed by: PHYSICAL THERAPIST

## 2024-01-16 PROCEDURE — 97530 THERAPEUTIC ACTIVITIES: CPT | Performed by: PHYSICAL THERAPIST

## 2024-01-16 NOTE — PROGRESS NOTES
[facility]  Physical Therapy Daily Treatment Note  Date: 2024  Patient Name: Randy Ca  : 1950   MRN: [id number]  Referring Provider: David Bull APRN - CNP  [address] [place] [address]     Medical Diagnosis: Parkinson's    Outcome Measure: RENE= 49    S:   Pt reports no new complaints. He has good days and bad.  O:     Time 1030-11:38 2x/week x 6 weeks (12)      Visit     Pain 0/10    ROM      Exercise     Nustep L6 x 10 min Seat 11, arms 11   Hamstring Curl alt 2# x 2 min     calf raises 2# x 1.5 min    Hip abd alt 2# x 2 min    Hip ext 2# x 2 min    March with AW 2# x 2 min    THERAPEUTIC ACTIVITIES Large, functional, dynamic, global movements used to build strength, balance, endurance, and flexibility and to improve physical performance.     Step-ups - FWD 6\" x 2 min     Step-ups - LAT up and over 6\" x 2 min    Step-ups - BWD     Alt lunges 2# x 2 min      NMR Feedback and cues necessary for developing neuromuscular control.  Movement education and guided movement interventions  used to improve performance and control.  To improve balance for safe community and home ambulation   March 2x 30'     Side stepping 2x 30'    Retro walk heel toe     Heel to toe     Fwd with cones     Ball push with cane     A:  Tolerated fair, freezing with lateral walk to left. See progress note.  P: Continue POC, progress as tolerated. Requested more visits.  Clay Payne, PT    Treatment Charges: Mins Units   Initial Evaluation      Re-Evaluation     Ther Exercise           36 2   Manual Therapy          Ther Activities          22 1   Gait Training               Neuro Re-education       Modalities     Non-Billable Service Time  20 0   Other     Total Time/Units 55 3

## 2024-01-18 ENCOUNTER — HOSPITAL ENCOUNTER (OUTPATIENT)
Dept: PHYSICAL THERAPY | Age: 74
Setting detail: THERAPIES SERIES
Discharge: HOME | End: 2024-01-18
Payer: MEDICARE

## 2024-01-18 PROCEDURE — 97110 THERAPEUTIC EXERCISES: CPT | Performed by: PHYSICAL THERAPIST

## 2024-01-18 PROCEDURE — 97530 THERAPEUTIC ACTIVITIES: CPT | Performed by: PHYSICAL THERAPIST

## 2024-01-18 NOTE — PROGRESS NOTES
[facility]  Physical Therapy Daily Treatment Note  Date: 2024  Patient Name: Randy Ca  : 1950   MRN: [id number]  Referring Provider: David Bull APRN - CNP  [address] [place] [address]     Medical Diagnosis: Parkinson's    Outcome Measure: RENE= 49    S:   Pt reports no new complaints. He has good days and bad.  O:     Time 1030-11:30 2x/week x 6 weeks (12)      Visit   2/8    Pain 0/10    ROM      Exercise     Nustep L6 x 10 min Seat 11, arms 11   Hamstring Curl alt 2# x 2 min     calf raises 2# x 1.5 min    Hip abd alt 2# x 2 min    Hip ext 2# x 2 min    March with AW 2# x 2 min    THERAPEUTIC ACTIVITIES Large, functional, dynamic, global movements used to build strength, balance, endurance, and flexibility and to improve physical performance.     Step-ups - FWD 6\" x 2 min     Step-ups - LAT up and over 6\" x 2 min    Step-ups - BWD     Alt lunges 2# x 2 min      NMR Feedback and cues necessary for developing neuromuscular control.  Movement education and guided movement interventions  used to improve performance and control.  To improve balance for safe community and home ambulation   March 2x 30'     Side stepping 2x 30'    Retro walk heel toe     Heel to toe     Fwd with cones     Ball push with cane     A:  Tolerated fair, freezing with lateral walk to left.    P: Continue POC, progress as tolerated.    Clay Payne, PT    Treatment Charges: Mins Units   Initial Evaluation      Re-Evaluation     Ther Exercise            33 2   Manual Therapy          Ther Activities          12 1   Gait Training               Neuro Re-education       Modalities     Non-Billable Service Time 15 0   Other     Total Time/Units 60 3

## 2024-01-23 ENCOUNTER — HOSPITAL ENCOUNTER (OUTPATIENT)
Dept: PHYSICAL THERAPY | Age: 74
Setting detail: THERAPIES SERIES
Discharge: HOME | End: 2024-01-23
Payer: MEDICARE

## 2024-01-23 PROCEDURE — 97530 THERAPEUTIC ACTIVITIES: CPT | Performed by: PHYSICAL THERAPIST

## 2024-01-23 PROCEDURE — 97110 THERAPEUTIC EXERCISES: CPT | Performed by: PHYSICAL THERAPIST

## 2024-01-23 NOTE — PROGRESS NOTES
[facility]  Physical Therapy Daily Treatment Note  Date: 2024  Patient Name: Randy Ca  : 1950   MRN: [id number]  Referring Provider: David Bull APRN - CNP  [address] [place] [address]     Medical Diagnosis: Parkinson's    Outcome Measure: RENE= 49    S:   Pt reports no new complaints. He has good days and bad.  O:     Time 1025-11:20 2x/week x 6 weeks (12)      Visit   3/8    Pain 0/10    ROM      Exercise     Nustep L6 x 10 min Seat 11, arms 11   Hamstring Curl alt 2# x 2 min     calf raises 2# x 1.5 min    Hip abd alt 2# x 2 min    Hip ext 2# x 2 min    March with AW 2# x 2 min    THERAPEUTIC ACTIVITIES Large, functional, dynamic, global movements used to build strength, balance, endurance, and flexibility and to improve physical performance.     Step-ups - FWD 6\" x 2 min     Step-ups - LAT up and over 6\" x 2 min    Step-ups - BWD     Alt lunges 2# x 2 min      NMR Feedback and cues necessary for developing neuromuscular control.  Movement education and guided movement interventions  used to improve performance and control.  To improve balance for safe community and home ambulation   March 2x 30'     Side stepping 2x 30'    Retro walk heel toe     Heel to toe     Fwd with cones     Ball push with cane     A:  Tolerated fair, freezing with lateral walk to left.    P: Continue POC, progress as tolerated.    Clay Payne, PT    Treatment Charges: Mins Units   Initial Evaluation      Re-Evaluation     Ther Exercise           23 2   Manual Therapy          Ther Activities          12 1   Gait Training               Neuro Re-education       Modalities     Non-Billable Service Time 15 0   Other     Total Time/Units 60 3

## 2024-01-25 ENCOUNTER — HOSPITAL ENCOUNTER (OUTPATIENT)
Dept: PHYSICAL THERAPY | Age: 74
Setting detail: THERAPIES SERIES
Discharge: HOME | End: 2024-01-25
Payer: MEDICARE

## 2024-01-25 PROCEDURE — 97530 THERAPEUTIC ACTIVITIES: CPT | Performed by: PHYSICAL THERAPIST

## 2024-01-25 PROCEDURE — 97110 THERAPEUTIC EXERCISES: CPT | Performed by: PHYSICAL THERAPIST

## 2024-01-25 NOTE — PROGRESS NOTES
[facility]  Physical Therapy Daily Treatment Note  Date: 2024  Patient Name: Randy Ca  : 1950   MRN: [id number]  Referring Provider: David Bull APRN - CNP  [address] [place] [address]     Medical Diagnosis: Parkinson's    Outcome Measure: RENE= 49    S:   Pt reports no new complaints. He has good days and bad.  O:     Time 1025-11:20 2x/week x 6 weeks (12)      Visit   4/8    Pain 0/10    ROM      Exercise     Nustep L6 x 10 min Seat 11, arms 11   Hamstring Curl alt 2# x 2 min     calf raises 2# x 1.5 min    Hip abd alt 2# x 2 min    Hip ext 2# x 2 min    March with AW 2# x 2 min    THERAPEUTIC ACTIVITIES Large, functional, dynamic, global movements used to build strength, balance, endurance, and flexibility and to improve physical performance.     Step-ups - FWD 6\" x 2 min     Step-ups - LAT up and over 6\" x 2 min    Step-ups - BWD     Alt lunges 2# x 2 min      NMR Feedback and cues necessary for developing neuromuscular control.  Movement education and guided movement interventions  used to improve performance and control.  To improve balance for safe community and home ambulation   March 2x 30'     Side stepping 2x 30'    Retro walk heel toe     Heel to toe     Fwd with cones     Ball push with cane     A:  Tolerated fair, freezing with lateral walk to left.    P: Continue POC, progress as tolerated.    Clay Payne, PT    Treatment Charges: Mins Units   Initial Evaluation      Re-Evaluation     Ther Exercise           35 3   Manual Therapy          Ther Activities          20 1   Gait Training               Neuro Re-education       Modalities     Non-Billable Service Time      Other     Total Time/Units 55 3

## 2024-01-30 ENCOUNTER — HOSPITAL ENCOUNTER (OUTPATIENT)
Dept: PHYSICAL THERAPY | Age: 74
Setting detail: THERAPIES SERIES
Discharge: HOME | End: 2024-01-30
Payer: MEDICARE

## 2024-01-30 PROCEDURE — 97530 THERAPEUTIC ACTIVITIES: CPT | Performed by: PHYSICAL THERAPIST

## 2024-01-30 PROCEDURE — 97110 THERAPEUTIC EXERCISES: CPT | Performed by: PHYSICAL THERAPIST

## 2024-01-30 NOTE — PROGRESS NOTES
[facility]  Physical Therapy Daily Treatment Note  Date: 2024  Patient Name: Randy Ca  : 1950   MRN: [id number]  Referring Provider: David Bull APRN - CNP  [address] [place] [address]     Medical Diagnosis: Parkinson's    Outcome Measure: RENE= 49    S:   Pt reports no new complaints. He has good days and bad.  O:     Time 7053-8131 2x/week x 6 weeks (12)      Visit   5/8    Pain 0/10    ROM      Exercise     Nustep L6 x 10 min Seat 11, arms 11   Hamstring Curl alt 2# x 2 min     calf raises 2# x 1.5 min    Hip abd alt 2# x 2 min    Hip ext 2# x 2 min    March with AW 2# x 2 min    THERAPEUTIC ACTIVITIES Large, functional, dynamic, global movements used to build strength, balance, endurance, and flexibility and to improve physical performance.     Step-ups - FWD 6\" x 2 min     Step-ups - LAT up and over 6\" x 2 min    Step-ups - BWD     Alt lunges 2# x 2 min      NMR Feedback and cues necessary for developing neuromuscular control.  Movement education and guided movement interventions  used to improve performance and control.  To improve balance for safe community and home ambulation   March 2x 30'     Side stepping 2x 30'    Retro walk heel toe     Heel to toe     Fwd with cones     Ball push with cane     A:  Tolerated fair, freezing with lateral walk to left.    P: Continue POC, progress as tolerated.    Clay Payne, PT    Treatment Charges: Mins Units   Initial Evaluation      Re-Evaluation     Ther Exercise           25 2   Manual Therapy          Ther Activities          13 1   Gait Training               Neuro Re-education       Modalities     Non-Billable Service Time 22  0   Other     Total Time/Units 60 3

## 2024-02-01 ENCOUNTER — HOSPITAL ENCOUNTER (OUTPATIENT)
Dept: PHYSICAL THERAPY | Age: 74
Setting detail: THERAPIES SERIES
Discharge: HOME | End: 2024-02-01
Payer: MEDICARE

## 2024-02-01 PROCEDURE — 97110 THERAPEUTIC EXERCISES: CPT | Performed by: PHYSICAL THERAPIST

## 2024-02-01 PROCEDURE — 97530 THERAPEUTIC ACTIVITIES: CPT | Performed by: PHYSICAL THERAPIST

## 2024-02-01 NOTE — PROGRESS NOTES
[facility]  Physical Therapy Daily Treatment Note  Date: 2024  Patient Name: Randy Ca  : 1950   MRN: [id number]  Referring Provider: David Bull APRN - CNP  [address] [place] [address]     Medical Diagnosis: Parkinson's    Outcome Measure: RENE= 49    S:   Pt reports no new complaints. He has good days and bad.  O:     Time 1022-11:16 2x/week x 6 weeks (12)      Visit     Pain 0/10    ROM      Exercise     Nustep L6 x 10 min Seat 11, arms 11   Hamstring Curl alt 2# x 2 min     calf raises 2# x 1.5 min    Hip abd alt 2# x 2 min    Hip ext 2# x 2 min    March with AW 2# x 2 min    THERAPEUTIC ACTIVITIES Large, functional, dynamic, global movements used to build strength, balance, endurance, and flexibility and to improve physical performance.     Step-ups - FWD 6\" x 2 min     Step-ups - LAT up and over 6\" x 2 min    Step-ups - BWD     Alt lunges 2# x 2 min      NMR Feedback and cues necessary for developing neuromuscular control.  Movement education and guided movement interventions  used to improve performance and control.  To improve balance for safe community and home ambulation   March 2x 30'     Side stepping 2x 30'    Retro walk heel toe     Heel to toe     Fwd with cones     Ball push with cane     A:  Tolerated fair, freezing with lateral walk to left.    P: Continue POC, progress as tolerated.    Clay Payne, PT    Treatment Charges: Mins Units   Initial Evaluation      Re-Evaluation     Ther Exercise           28 2   Manual Therapy          Ther Activities          10 1   Gait Training               Neuro Re-education       Modalities     Non-Billable Service Time 16 0   Other     Total Time/Units 54 3

## 2024-02-06 ENCOUNTER — HOSPITAL ENCOUNTER (OUTPATIENT)
Dept: PHYSICAL THERAPY | Age: 74
Setting detail: THERAPIES SERIES
Discharge: HOME | End: 2024-02-06
Payer: MEDICARE

## 2024-02-06 PROCEDURE — 97530 THERAPEUTIC ACTIVITIES: CPT | Performed by: PHYSICAL THERAPIST

## 2024-02-06 PROCEDURE — 97110 THERAPEUTIC EXERCISES: CPT | Performed by: PHYSICAL THERAPIST

## 2024-02-06 NOTE — PROGRESS NOTES
[facility]  Physical Therapy Daily Treatment Note  Date: 2024  Patient Name: Randy Ca  : 1950   MRN: [id number]  Referring Provider: David Bull APRN - CNP  [address] [place] [address]     Medical Diagnosis: Parkinson's    Outcome Measure: RENE= 49    S:   Pt reports no new complaints. He has good days and bad.  O:     Time 1026-11:20 2x/week x 6 weeks (12)      Visit   7/8    Pain 0/10    ROM      Exercise     Nustep L6 x 10 min Seat 11, arms 11   Hamstring Curl alt 2# x 2 min     calf raises 2# x 1.5 min    Hip abd alt 2# x 2 min    Hip ext 2# x 2 min    March with AW 2# x 2 min    THERAPEUTIC ACTIVITIES Large, functional, dynamic, global movements used to build strength, balance, endurance, and flexibility and to improve physical performance.     Step-ups - FWD 6\" x 2 min     Step-ups - LAT up and over 6\" x 2 min    Step-ups - BWD     Alt lunges 2# x 2 min      NMR Feedback and cues necessary for developing neuromuscular control.  Movement education and guided movement interventions  used to improve performance and control.  To improve balance for safe community and home ambulation   March 2x 30'     Side stepping 2x 30'    Retro walk heel toe     Heel to toe     Fwd with cones     Ball push with cane     A:  Tolerated fair, freezing with lateral walk to left.    P: Continue POC, progress as tolerated.    Clay Payne, PT    Treatment Charges: Mins Units   Initial Evaluation      Re-Evaluation     Ther Exercise           30 2   Manual Therapy          Ther Activities          24 2   Gait Training               Neuro Re-education       Modalities     Non-Billable Service Time      Other     Total Time/Units 54 4

## 2024-02-08 ENCOUNTER — HOSPITAL ENCOUNTER (OUTPATIENT)
Dept: PHYSICAL THERAPY | Age: 74
Setting detail: THERAPIES SERIES
Discharge: HOME | End: 2024-02-08
Payer: MEDICARE

## 2024-02-08 PROCEDURE — 97110 THERAPEUTIC EXERCISES: CPT | Performed by: PHYSICAL THERAPIST

## 2024-02-08 PROCEDURE — 97530 THERAPEUTIC ACTIVITIES: CPT | Performed by: PHYSICAL THERAPIST

## 2024-02-08 NOTE — PROGRESS NOTES
[facility]  Physical Therapy Daily Treatment Note  Date: 2024  Patient Name: Randy Ca  : 1950   MRN: [id number]  Referring Provider: David Bull APRN - CNP  [address] [place] [address]     Medical Diagnosis: Parkinson's    Outcome Measure: RENE= 49    S:   Pt reports no new complaints. He has good days and bad.+2x/wk, 4 weeks new script  O:     Time 3706-1275 2x/week x 6 weeks (12)      Visit  +2x/wk, 4 weeks new script  +2x/wk, 4 weeks new script   Pain 0/10    ROM      Exercise     Nustep L6 x 10 min Seat 11, arms 11   Hamstring Curl alt 2# x 2 min     calf raises 2# x 1.5 min    Hip abd alt 2# x 2 min    Hip ext 2# x 2 min    March with AW 2# x 2 min    THERAPEUTIC ACTIVITIES Large, functional, dynamic, global movements used to build strength, balance, endurance, and flexibility and to improve physical performance.     Step-ups - FWD 6\" x 2 min     Step-ups - LAT up and over 6\" x 2 min    Step-ups - BWD     Alt lunges 2# x 2 min      NMR Feedback and cues necessary for developing neuromuscular control.  Movement education and guided movement interventions  used to improve performance and control.  To improve balance for safe community and home ambulation   March 2x 30'     Side stepping 2x 30'    Retro walk heel toe     Heel to toe     Fwd with cones     Ball push with cane     A:  Tolerated fair, freezing with lateral walk to left.    P: Continue POC, progress as tolerated.    Clay Payne, PT    Treatment Charges: Mins Units   Initial Evaluation      Re-Evaluation     Ther Exercise           30 2   Manual Therapy          Ther Activities          24 2   Gait Training               Neuro Re-education       Modalities     Non-Billable Service Time      Other     Total Time/Units 54 4

## 2024-02-13 ENCOUNTER — HOSPITAL ENCOUNTER (OUTPATIENT)
Dept: PHYSICAL THERAPY | Age: 74
Setting detail: THERAPIES SERIES
Discharge: HOME | End: 2024-02-13
Payer: MEDICARE

## 2024-02-13 PROCEDURE — 97110 THERAPEUTIC EXERCISES: CPT | Performed by: PHYSICAL THERAPIST

## 2024-02-13 NOTE — PROGRESS NOTES
[facility]  Physical Therapy Daily Treatment Note  Date: 2024  Patient Name: Randy Ca  : 1950   MRN: [id number]  Referring Provider: David Bull, ALISON - CNP  [address] [place] [address]     Medical Diagnosis: Parkinson's    Outcome Measure: RENE= 49    S:   Pt reports he is not feeling well today, may no be able to do much today. Parkinson's symptoms are worse today, pt freezing with gait.   O:   Decreased time on noted therex today.  Time 5141-3408 2x/week x 6 weeks (12)      Visit  +2x/wk, 4 weeks new script  +2x/wk, 4 weeks new script  +2x/wk, 4 weeks new script     Pain 0/10    ROM      Exercise     Nustep L6 x 10 min Seat 11, arms 11   Hamstring Curl alt 2# x 2 min     calf raises 2# x 1.5 min    Hip abd alt 2# x 1 min    Hip ext 2# x 1 min    March with AW 2# x 1 min    THERAPEUTIC ACTIVITIES Large, functional, dynamic, global movements used to build strength, balance, endurance, and flexibility and to improve physical performance.     Step-ups - FWD 6\" x 1 min     Step-ups - LAT up and over 6\" x 1 min    Step-ups - BWD     Alt lunges 2# x 2 min      NMR Feedback and cues necessary for developing neuromuscular control.  Movement education and guided movement interventions  used to improve performance and control.  To improve balance for safe community and home ambulation   March 2x 30'     Side stepping 2x 30'    Retro walk heel toe     Heel to toe     Fwd with cones     Ball push with cane     A:  Tolerated fair, freezing with lateral walk to left.    P: Continue POC, progress as tolerated.    Clay Payne, PT    Treatment Charges: Mins Units   Initial Evaluation      Re-Evaluation     Ther Exercise           30 2   Manual Therapy          Ther Activities              Gait Training               Neuro Re-education       Modalities     Non-Billable Service Time      Other     Total Time/Units  30 2

## 2024-02-15 ENCOUNTER — HOSPITAL ENCOUNTER (OUTPATIENT)
Dept: PHYSICAL THERAPY | Age: 74
Setting detail: THERAPIES SERIES
Discharge: HOME | End: 2024-02-15
Payer: MEDICARE

## 2024-02-20 ENCOUNTER — HOSPITAL ENCOUNTER (OUTPATIENT)
Dept: PHYSICAL THERAPY | Age: 74
Setting detail: THERAPIES SERIES
Discharge: HOME | End: 2024-02-20
Payer: MEDICARE

## 2024-02-20 NOTE — PROGRESS NOTES
Physical Therapy Progress Note    Date: 2024  Patient Name: Randy Ca  : 1950   MRN: [id number]    Pt called to cancel PT appt  today, COVID pos    Clay Payne PT

## 2024-02-22 ENCOUNTER — HOSPITAL ENCOUNTER (OUTPATIENT)
Dept: PHYSICAL THERAPY | Age: 74
Setting detail: THERAPIES SERIES
Discharge: HOME | End: 2024-02-22
Payer: MEDICARE

## 2024-02-22 PROCEDURE — 97530 THERAPEUTIC ACTIVITIES: CPT | Performed by: PHYSICAL THERAPIST

## 2024-02-22 PROCEDURE — 97110 THERAPEUTIC EXERCISES: CPT | Performed by: PHYSICAL THERAPIST

## 2024-02-22 NOTE — PROGRESS NOTES
[facility]  Physical Therapy Daily Treatment Note  Date: 2024  Patient Name: Randy Ca  : 1950   MRN: [id number]  Referring Provider: David Bull, ALISON - CNP  [address] [place] [address]     Medical Diagnosis: Parkinson's    Outcome Measure: RENE= 49    S:   Pt reports he is not feeling well today, may no be able to do much today. Parkinson's symptoms are worse today, pt freezing with gait.   O:   Decreased time on noted therex today.  Time 4720-6279 2x/week x 6 weeks (12)      Visit   2/8 +2x/wk, 4 weeks new script  +2x/wk, 4 weeks new script  +2x/wk, 4 weeks new script     Pain 0/10    ROM      Exercise     Nustep L6 x 10 min Seat 11, arms 11   Hamstring Curl alt 2# x 2 min     calf raises 2# x 1.5 min    Hip abd alt 2# x 1.5 min    Hip ext 2# x 1.5 min    March with AW 2# x 1.5 min    THERAPEUTIC ACTIVITIES Large, functional, dynamic, global movements used to build strength, balance, endurance, and flexibility and to improve physical performance.     Step-ups - FWD 6\" x 1 min     Step-ups - LAT up and over 6\" x 1 min    Step-ups - BWD     Alt lunges 2# x 2 min      NMR Feedback and cues necessary for developing neuromuscular control.  Movement education and guided movement interventions  used to improve performance and control.  To improve balance for safe community and home ambulation   March 2x 30'     Side stepping 2x 30'    Retro walk heel toe     Heel to toe     Fwd with cones     Ball push with cane     A:  Tolerated fair, freezing with lateral walk to left.    P: Continue POC, progress as tolerated.    Clay Payne, PT    Treatment Charges: Mins Units   Initial Evaluation      Re-Evaluation     Ther Exercise           40 2   Manual Therapy          Ther Activities          10 1   Gait Training               Neuro Re-education       Modalities     Non-Billable Service Time      Other     Total Time/Units 50 3

## 2024-02-27 ENCOUNTER — HOSPITAL ENCOUNTER (OUTPATIENT)
Dept: PHYSICAL THERAPY | Age: 74
Setting detail: THERAPIES SERIES
Discharge: HOME | End: 2024-02-27
Payer: MEDICARE

## 2024-02-27 PROCEDURE — 97110 THERAPEUTIC EXERCISES: CPT | Performed by: PHYSICAL THERAPIST

## 2024-02-27 PROCEDURE — 97530 THERAPEUTIC ACTIVITIES: CPT | Performed by: PHYSICAL THERAPIST

## 2024-02-27 NOTE — PROGRESS NOTES
[facility]  Physical Therapy Daily Treatment Note  Date: 2024  Patient Name: Randy Ca  : 1950   MRN: [id number]  Referring Provider: David Bull, APRN - CNP  [address] [place] [address]     Medical Diagnosis: Parkinson's    Outcome Measure: RENE= 49    S:   Pt reports he is not feeling well today, may no be able to do much today. Pt has no energy today, slept later than is usual for him today.  Parkinson's symptoms are worse today, pt freezing with gait.   O:   Decreased time and weights on noted therex today.  Time 9714-6079 2x/week x 6 weeks (12)      Visit 12/12  8/8  3/ +2x/wk, 4 weeks new script  +2x/wk, 4 weeks new script  +2x/wk, 4 weeks new script     Pain 0/10    ROM      Exercise     Nustep L6 x 10 min Seat 11, arms 11   Hamstring Curl alt 1# x 1.5 min     calf raises 1# x 1.5 min    Hip abd alt 1# x 1.5 min    Hip ext 1# x 1.5 min    March with AW 1# x 1.5 min    THERAPEUTIC ACTIVITIES Large, functional, dynamic, global movements used to build strength, balance, endurance, and flexibility and to improve physical performance.     Step-ups - FWD 6\" x 1.5 min     Step-ups - LAT up and over 6\" x 1.5 min    Step-ups - BWD     Alt lunges 2# x 2 min      NMR Feedback and cues necessary for developing neuromuscular control.  Movement education and guided movement interventions  used to improve performance and control.  To improve balance for safe community and home ambulation   March 2x 30'     Side stepping 2x 30'    Retro walk heel toe     Heel to toe     Fwd with cones     Ball push with cane     A:  Tolerated fair, freezing with lateral walk to left.    P: Continue POC, progress as tolerated.    Clay Payne, PT    Treatment Charges: Mins Units   Initial Evaluation      Re-Evaluation     Ther Exercise           40 2   Manual Therapy          Ther Activities          10 1   Gait Training               Neuro Re-education       Modalities     Non-Billable

## 2024-02-29 ENCOUNTER — HOSPITAL ENCOUNTER (OUTPATIENT)
Dept: PHYSICAL THERAPY | Age: 74
Setting detail: THERAPIES SERIES
Discharge: HOME | End: 2024-02-29
Payer: MEDICARE

## 2024-02-29 PROCEDURE — 97110 THERAPEUTIC EXERCISES: CPT | Performed by: PHYSICAL THERAPIST

## 2024-02-29 PROCEDURE — 97530 THERAPEUTIC ACTIVITIES: CPT | Performed by: PHYSICAL THERAPIST

## 2024-02-29 NOTE — PROGRESS NOTES
[facility]  Physical Therapy Daily Treatment Note  Date: 2024  Patient Name: Randy Ca  : 1950   MRN: [id number]  Referring Provider: David Bull APRN - CNP  [address] [place] [address]     Medical Diagnosis: Parkinson's    Outcome Measure: RENE= 49    S:   Pt reports he is   feeling well today.  Parkinson's symptoms are better  today, pt with less freezing with gait.   O: Pt given imformation on PD classes at [facility] at the [facility].   Time 7608-4485 2x/week x 6 weeks (12)      Visit /8 +2x/wk, 4 weeks new script  +2x/wk, 4 weeks new script  +2x/wk, 4 weeks new script     Pain 0/10    ROM      Exercise     Nustep L6 x 10 min Seat 11, arms 11   Hamstring Curl alt 1# x 1.5 min     calf raises 1# x 1.5 min    Hip abd alt 1# x 1.5 min    Hip ext 1# x 1.5 min    March with AW 1# x 1.5 min    THERAPEUTIC ACTIVITIES Large, functional, dynamic, global movements used to build strength, balance, endurance, and flexibility and to improve physical performance.     Step-ups - FWD 6\" x 1.5 min     Step-ups - LAT up and over 6\" x 1.5 min    Step-ups - BWD     Alt lunges 2# x 2 min      NMR Feedback and cues necessary for developing neuromuscular control.  Movement education and guided movement interventions  used to improve performance and control.  To improve balance for safe community and home ambulation   March 2x 30'     Side stepping 2x 30'    Retro walk heel toe     Heel to toe     Fwd with cones     Ball push with cane     A:  Tolerated fair, freezing with lateral walk to left.    P: Continue POC, progress as tolerated.    Clay Payne, PT    Treatment Charges: Mins Units   Initial Evaluation      Re-Evaluation     Ther Exercise           40 2   Manual Therapy          Ther Activities          10 1   Gait Training               Neuro Re-education       Modalities     Non-Billable Service Time      Other     Total Time/Units 50 3

## 2024-03-05 ENCOUNTER — HOSPITAL ENCOUNTER (OUTPATIENT)
Dept: PHYSICAL THERAPY | Age: 74
Setting detail: THERAPIES SERIES
Discharge: HOME | End: 2024-03-05
Payer: MEDICARE

## 2024-03-05 PROCEDURE — 97530 THERAPEUTIC ACTIVITIES: CPT | Performed by: PHYSICAL THERAPIST

## 2024-03-05 PROCEDURE — 97110 THERAPEUTIC EXERCISES: CPT | Performed by: PHYSICAL THERAPIST

## 2024-03-05 NOTE — PROGRESS NOTES
[facility]  Physical Therapy Daily Treatment Note  Date: 3/5/2024  Patient Name: Randy Ca  : 1950   MRN: [id number]  Referring Provider: David Bull, ALISON - CNP  [address] [place] [address]     Medical Diagnosis: Parkinson's    Outcome Measure: RENE= 49    S:   Pt reports he is   feeling well today.  Parkinson's symptoms are better  today, pt with less freezing with gait.   O: Pt given imformation on PD classes at [facility] at the [facility].   Time 0067-7875 2x/week x 6 weeks (12)      Visit /8 +2x/wk, 4 weeks new script  +2x/wk, 4 weeks new script  +2x/wk, 4 weeks new script     Pain 0/10    ROM      Exercise     Nustep L6 x 10 min Seat 11, arms 11   Hamstring Curl alt 1# x 1.5 min     calf raises 1# x 1.5 min    Hip abd alt 1# x 1.5 min    Hip ext 1# x 1.5 min    March with AW 1# x 1.5 min    THERAPEUTIC ACTIVITIES Large, functional, dynamic, global movements used to build strength, balance, endurance, and flexibility and to improve physical performance.     Step-ups - FWD 6\" x 1.5 min     Step-ups - LAT up and over 6\" x 1.5 min    Step-ups - BWD     Alt lunges 2# x 2 min      NMR Feedback and cues necessary for developing neuromuscular control.  Movement education and guided movement interventions  used to improve performance and control.  To improve balance for safe community and home ambulation   March 2x 30'     Side stepping 2x 30'    Retro walk heel toe     Heel to toe     Fwd with cones     Ball push with cane     A:  Tolerated fair, freezing with lateral walk to left.    P: Continue POC, progress as tolerated.    Clay Payne, PT    Treatment Charges: Mins Units   Initial Evaluation      Re-Evaluation     Ther Exercise           45 3   Manual Therapy          Ther Activities          13 1   Gait Training               Neuro Re-education       Modalities     Non-Billable Service Time      Other     Total Time/Units 58 4

## 2024-03-07 ENCOUNTER — HOSPITAL ENCOUNTER (OUTPATIENT)
Dept: PHYSICAL THERAPY | Age: 74
Setting detail: THERAPIES SERIES
Discharge: HOME | End: 2024-03-07
Payer: MEDICARE

## 2024-03-07 PROCEDURE — 97110 THERAPEUTIC EXERCISES: CPT

## 2024-03-07 PROCEDURE — 97530 THERAPEUTIC ACTIVITIES: CPT

## 2024-03-07 NOTE — PROGRESS NOTES
[facility]  Physical Therapy Daily Treatment Note  Date: 3/7/2024  Patient Name: Randy Ca  : 1950   MRN: [id number]  Referring Provider: David Bull APRN - CNP  [address] [place] [address]     Medical Diagnosis: Parkinson's    Outcome Measure: RENE= 49    S:  Pt reports feeling well today.  Parkinson's symptoms are better today, pt with less freezing with gait.   O: Pt given imformation on PD classes at [facility] at the [facility] last session. He is going to try when his wife is out of school.  Time 7975-2634 2x/week x 6 weeks (12)      Visit  +2x/wk, 4 weeks new script  +2x/wk, 4 weeks new script  +2x/wk, 4 weeks new script     Pain 0/10    ROM      Exercise     Nustep L6 x 10 min Seat 11, arms 11   Hamstring Curl alt 1# x 1.5 min     calf raises 1# x 1.5 min    Hip abd alt 1# x 1.5 min    Hip ext 1# x 1.5 min    March with AW 1# x 1.5 min    THERAPEUTIC ACTIVITIES Large, functional, dynamic, global movements used to build strength, balance, endurance, and flexibility and to improve physical performance.     Step-ups - FWD 6\" x 1.5 min     Step-ups - LAT up and over 6\" x 1.5 min    Step-ups - BWD     Alt lunges 1# x 2 min      NMR Feedback and cues necessary for developing neuromuscular control.  Movement education and guided movement interventions  used to improve performance and control.  To improve balance for safe community and home ambulation   March 2x 30'     Side stepping 2x 30'    Retro walk heel toe     Heel to toe     Fwd with cones     Ball push with cane     A:  Tolerated exercises well, NMR fair, freezing with lateral walk to left.    P: Continue POC, progress as tolerated.    Jennifer Tamayo PTA    Treatment Charges: Mins Units   Initial Evaluation      Re-Evaluation     Ther Exercise           30 2   Manual Therapy          Ther Activities          8 1   Gait Training               Neuro Re-education       Modalities     Non-Billable

## 2024-03-12 ENCOUNTER — HOSPITAL ENCOUNTER (OUTPATIENT)
Dept: PHYSICAL THERAPY | Age: 74
Setting detail: THERAPIES SERIES
Discharge: HOME | End: 2024-03-12
Payer: MEDICARE

## 2024-03-12 PROCEDURE — 97112 NEUROMUSCULAR REEDUCATION: CPT | Performed by: PHYSICAL THERAPIST

## 2024-03-12 PROCEDURE — 97110 THERAPEUTIC EXERCISES: CPT | Performed by: PHYSICAL THERAPIST

## 2024-03-12 NOTE — PROGRESS NOTES
PT PROGRESS REPORT      PATIENT: Randy Ca   Date:  3/12/2024  DIAGNOSIS:  Parkinson's  PHYSICIAN:  David Bull, APRN - CNP  [address] [place] [address]     ATTENDANCE:  27  OUT OF 27 APPOINTMENT FROM  10/27/2023 TO 3/12/2024  TREATMENTS RECEIVED:  THEREX, GAIT, BALANCE, HEP     INITIAL PROBLEM 1/11/2024 CURRENT STATUS         RENE BALANCE TEST= 32 40 40                   DECREASED STRENGTH RLE      HIP              Flex= 4/5              Abd= 4/5              Add= 4/5  Knee    flex = 4/5    / =  4/5  Ankle    pf=      4/5    df=     4/5           HIP              Flex= 4+/5              Abd= 4+/5              Add= 4/5  Knee    flex = 4+/5    / =  5-/5  Ankle    pf=     5-/5    df=    4+/5     HIP              Flex= 5-/5              Abd= 4+/5              Add= 4/5  Knee    flex = 4+/5    / =  5-/5  Ankle    pf=     5-/5    df=    5-/5    DECREASED STRENGTH LLE      HIP              Flex= 4-/5              Abd= 4-/5              Add= 4-/5  Knee    flex = 4-/5    / =  4-/5  Ankle    pf=      4/5    df=     4/5     HIP              Flex= 4+/5              Abd= 4+/5              Add= 4/5  Knee    flex = 4+/5    / =  5-/5  Ankle    pf=     5-/5    df=    4+/5     HIP              Flex= 5-/5              Abd= 4+/5              Add= 4/5  Knee    flex = 4+/5    / =  5-/5  Ankle    pf=     5-/5    df=   5-/5     COMMENTS/ RECOMMENDATIONS: Pt progressed slowly with strength and balance. Progress has plateud. Pt still having difficulty with freezing with initiating movement. Pt has x1 session left on current POC. Pt is discharged from PT at this time.        THANK YOU FOR THE OPPORTUNITY TO WORK WITH WITH THIS PATIENT  ABIMBOLA PERRY PT            IF YOU HAVE ANY QUESTIONS OR COMMENTS, PLEASE FEEL FREE TO CONTACT ME AT   [phone]  [address] [address]  FAX : [phone]  PHONE: [phone]

## 2024-03-12 NOTE — PROGRESS NOTES
[facility]  Physical Therapy Daily Treatment Note  Date: 3/12/2024  Patient Name: Randy Ca  : 1950   MRN: [id number]  Referring Provider: David Bull APRN - CNP  [address] [place] [address]     Medical Diagnosis: Parkinson's    Outcome Measure: RENE= 49    S:  Pt reports feeling same as usual today.    O:    Time 9505-6421 2x/week x 6 weeks (12)      Visit  +2x/wk, 4 weeks new script  +2x/wk, 4 weeks new script  +2x/wk, 4 weeks new script     Pain 0/10    ROM      Exercise     Nustep L6 x 10 min Seat 11, arms 11   Hamstring Curl alt 1# x 1.5 min     calf raises 1# x 1.5 min    Hip abd alt 1# x 1.5 min    Hip ext 1# x 1.5 min    March with AW 1# x 1.5 min    THERAPEUTIC ACTIVITIES Large, functional, dynamic, global movements used to build strength, balance, endurance, and flexibility and to improve physical performance.     Step-ups - FWD 6\" x 1.5 min     Step-ups - LAT up and over 6\" x 1.5 min    Step-ups - BWD     Alt lunges 1# x 2 min      NMR Feedback and cues necessary for developing neuromuscular control.  Movement education and guided movement interventions  used to improve performance and control.  To improve balance for safe community and home ambulation   March 2x 30'     Side stepping 2x 30'    Retro walk heel toe     Heel to toe     Fwd with cones     Ball push with cane     A:  Tolerated exercises fair,  NMR fair, freezing with most activities after short time.   P: Continue POC for x1 remaining session, progress as tolerated.    Clay Payne, SHIVA    Treatment Charges: Mins Units   Initial Evaluation      Re-Evaluation     Ther Exercise           39  2   Manual Therapy          Ther Activities          3 0   Gait Training               Neuro Re-education   8 1   Modalities     Non-Billable Service Time     Other     Total Time/Units 50 3

## 2024-03-14 ENCOUNTER — HOSPITAL ENCOUNTER (OUTPATIENT)
Dept: PHYSICAL THERAPY | Age: 74
Setting detail: THERAPIES SERIES
Discharge: HOME | End: 2024-03-14
Payer: MEDICARE

## 2024-03-14 PROCEDURE — 97530 THERAPEUTIC ACTIVITIES: CPT

## 2024-03-14 PROCEDURE — 97110 THERAPEUTIC EXERCISES: CPT

## 2024-05-13 NOTE — PROGRESS NOTES
Attending Physician Statement  I have discussed the care of Dereje Carrion, 31 y.o. male,including pertinent history and exam findings,  with the resident Scar Rocha MD.  History:  Chief Complaint   Patient presents with    Health Maintenance     Declined vaccines.     Results     Infectious disease referral per provider.      I have reviewed the key elements of the encounter with the resident. Examination was done by resident as documented in residents note.  BP Readings from Last 3 Encounters:   05/13/24 136/83   04/12/24 130/80     /83 (Site: Left Upper Arm, Position: Sitting, Cuff Size: Medium Adult)   Pulse 96   Temp 99.4 °F (37.4 °C) (Oral)   Ht 1.829 m (6' 0.01\")   Wt 100.2 kg (221 lb)   SpO2 98%   BMI 29.97 kg/m²   Lab Results   Component Value Date    WBC 3.5 04/12/2024    WBC 3.5 04/12/2024    HGB 14.1 04/12/2024    HCT 42.1 04/12/2024     04/12/2024    ALT 25 04/12/2024    AST 26 04/12/2024     04/12/2024    K 4.0 04/12/2024     04/12/2024    CREATININE 1.1 04/12/2024    BUN 12 04/12/2024    CO2 22 04/12/2024     Lab Results   Component Value Date    CALCIUM 9.8 04/12/2024     No results found for: \"LDLDIRECT\"  I agree with the assessment, plan and diagnosis of    Diagnosis Orders   1. Asymptomatic HIV infection, with no history of HIV-related illness (HCC)  Comprehensive Metabolic Panel    Hemoglobin A1C    Lipid Panel    Urinalysis with Microscopic      2. Need for hepatitis B screening test  bictegravir-emtricitab-tenofovir alafenamide (BIKTARVY) -25 MG TABS per tablet      3. Depression, unspecified depression type  escitalopram (LEXAPRO) 20 MG tablet    melatonin (RA MELATONIN) 3 MG TABS tablet      4. Early syphilis  doxycycline hyclate (VIBRA-TABS) 100 MG tablet        I agree with  orders as documented by the resident.  Recommendations: Agree with resident assessment and plan.  Patient likely to benefit from penicillin IM and injection however due to 
[facility]  Physical Therapy Daily Treatment Note  Date: 2023  Patient Name: Randi Rollins  : 1950   MRN: [id number]  Referring Provider: ALISON Benitez - CNP  [place]  [place],   [facility] Diagnosis: Parkinson's    Outcome Measure: RENE= 52    S: Patient reports tolerating last treatment well. O:     Time 0955-10:55 2x/week x 6 weeks (12)  + 2x/wk, 4 weeks    Visit   3/8    Pain 0/10    ROM      Exercise     Nustep L6 x 8 min Seat 11, arms 11   Hamstring Curl alt 2# x 2 min     calf raises 2# x 2 min    Hip abd alt 2# x 2 min    Hip ext alt 2# x 2 min    March with AW 2# x  2 min      THERAPEUTIC ACTIVITIES Large, functional, dynamic, global movements used to build strength, balance, endurance, and flexibility and to improve physical performance. Step-ups - FWD Alt. 6\" x 2 min    Step-ups - LAT 6\" x 2 min, up & over    Step-ups - BWD     Alt lunges  2# x 1.5 min     NMR Feedback and cues necessary for developing neuromuscular control. Movement education and guided movement interventions  used to improve performance and control. To improve balance for safe community and home ambulation   March 4 x 20'    Side stepping 4 x 20'    Retro walk heel toe     Heel to toe 4 x 20'    Fwd with cones     Ball push with cane     A:  Tolerated well. Se reeval  P: Continue POC.  Request sent for additional PT  Naomie Colon PT    Treatment Charges: Mins Units   Initial Evaluation     Re-Evaluation     Ther Exercise         TE 42 2   Manual Therapy     MT     Ther Activities        TA 8 1   Gait Training          GT     Neuro Re-education NR 10 1   Modalities     Non-Billable Service Time     Other     Total Time/Units 60 4
Visit Information    Have you changed or started any medications since your last visit including any over-the-counter medicines, vitamins, or herbal medicines? no   Have you stopped taking any of your medications? Is so, why? -  yes, see list  Are you having any side effects from any of your medications? - no    Have you seen any other physician or provider since your last visit?  no   Have you had any other diagnostic tests since your last visit?  no   Have you been seen in the emergency room and/or had an admission in a hospital since we last saw you?  no   Have you had your routine dental cleaning in the past 6 months?  no     Do you have an active MyChart account? If no, what is the barrier?  Yes    Patient Care Team:  Kathleen Hudson MD as PCP - General (Family Medicine)    Medical History Review  Past Medical, Family, and Social History reviewed and does not contribute to the patient presenting condition    Health Maintenance   Topic Date Due    Hepatitis B vaccine (1 of 3 - 3-dose series) Never done    Varicella vaccine (1 of 2 - 2-dose childhood series) Never done    Meningococcal (ACWY) vaccine (1 - Risk 2-dose series) Never done    COVID-19 Vaccine (1) Never done    Pneumococcal 0-64 years Vaccine (1 of 2 - PCV) Never done    Measles,Mumps,Rubella (MMR) vaccine (1 of 2 - Risk 2-dose series) Never done    Hepatitis A vaccine (1 of 2 - Risk 2-dose series) Never done    DTaP/Tdap/Td vaccine (1 - Tdap) Never done    Shingles vaccine (1 of 2) Never done    Flu vaccine (Season Ended) 08/01/2024    Depression Monitoring  04/12/2025    Hepatitis C screen  Completed    Hib vaccine  Aged Out    HPV vaccine  Aged Out    Polio vaccine  Aged Out    Depression Screen  Discontinued    HIV screen  Discontinued             
  Constitutional:  Negative for activity change, appetite change, chills, diaphoresis, fatigue, fever and unexpected weight change.   Respiratory:  Negative for cough, shortness of breath and wheezing.    Cardiovascular:  Negative for chest pain, palpitations and leg swelling.   Gastrointestinal:  Negative for abdominal distention, abdominal pain, blood in stool, constipation, diarrhea, nausea and vomiting.   Genitourinary: Negative.         Vitals:    05/13/24 1513   BP: 136/83   Pulse: 96   Temp: 99.4 °F (37.4 °C)   SpO2: 98%       Physical Exam  Cardiovascular:      Rate and Rhythm: Normal rate and regular rhythm.      Pulses: Normal pulses.      Heart sounds: Normal heart sounds.   Pulmonary:      Effort: Pulmonary effort is normal.      Breath sounds: Normal breath sounds.   Abdominal:      Palpations: Abdomen is soft.   Musculoskeletal:      Right lower leg: No edema.      Left lower leg: No edema.   Psychiatric:         Mood and Affect: Affect is flat.         Speech: Speech normal.         Behavior: Behavior is slowed.           Diagnosis Orders   1. Asymptomatic HIV infection, with no history of HIV-related illness (HCC)  Comprehensive Metabolic Panel    Hemoglobin A1C    Lipid Panel    Urinalysis with Microscopic      2. Need for hepatitis B screening test  bictegravir-emtricitab-tenofovir alafenamide (BIKTARVY) -25 MG TABS per tablet      3. Depression, unspecified depression type  escitalopram (LEXAPRO) 20 MG tablet    melatonin (RA MELATONIN) 3 MG TABS tablet      4. Early syphilis  doxycycline hyclate (VIBRA-TABS) 100 MG tablet          Plan:  1.)  HIV, will refill Biktarvy, will give information of referral to ID for further management  2.)  Syphilis, given prescription of Doxy  3.)  Major depression, refill of Lexapro ordered  4.)  [facility] ordered      Requested Prescriptions     Signed Prescriptions Disp Refills    bictegravir-emtricitab-tenofovir alafenamide (BIKTARVY) -25 MG TABS per

## 2024-06-03 LAB
INR PPP: 2.1
PROTHROMBIN TIME: 22.5 SEC (ref 9.3–12.4)

## 2024-07-01 LAB
INR PPP: 2.3
PROTHROMBIN TIME: 25.7 SEC (ref 9.3–12.4)

## 2024-08-06 LAB
INR PPP: 2.5
PROTHROMBIN TIME: 27.2 SEC (ref 9.3–12.4)

## 2024-08-13 ENCOUNTER — OFFICE VISIT (OUTPATIENT)
Dept: PRIMARY CARE CLINIC | Age: 74
End: 2024-08-13
Payer: MEDICARE

## 2024-08-13 VITALS
WEIGHT: 160.1 LBS | OXYGEN SATURATION: 93 % | HEIGHT: 70 IN | DIASTOLIC BLOOD PRESSURE: 72 MMHG | TEMPERATURE: 97.2 F | HEART RATE: 77 BPM | SYSTOLIC BLOOD PRESSURE: 118 MMHG | BODY MASS INDEX: 22.92 KG/M2

## 2024-08-13 DIAGNOSIS — I48.20 CHRONIC ATRIAL FIBRILLATION (HCC): ICD-10-CM

## 2024-08-13 DIAGNOSIS — R53.83 OTHER FATIGUE: ICD-10-CM

## 2024-08-13 DIAGNOSIS — Z12.5 PROSTATE CANCER SCREENING: ICD-10-CM

## 2024-08-13 DIAGNOSIS — G20.B1 PARKINSON'S DISEASE WITH DYSKINESIA WITHOUT FLUCTUATING MANIFESTATIONS (HCC): Primary | ICD-10-CM

## 2024-08-13 DIAGNOSIS — Z91.81 AT HIGH RISK FOR FALLS: ICD-10-CM

## 2024-08-13 DIAGNOSIS — Z13.220 LIPID SCREENING: ICD-10-CM

## 2024-08-13 DIAGNOSIS — F10.20 ALCOHOLISM (HCC): ICD-10-CM

## 2024-08-13 DIAGNOSIS — I10 BENIGN ESSENTIAL HTN: ICD-10-CM

## 2024-08-13 PROCEDURE — G8420 CALC BMI NORM PARAMETERS: HCPCS | Performed by: INTERNAL MEDICINE

## 2024-08-13 PROCEDURE — 3017F COLORECTAL CA SCREEN DOC REV: CPT | Performed by: INTERNAL MEDICINE

## 2024-08-13 PROCEDURE — 3074F SYST BP LT 130 MM HG: CPT | Performed by: INTERNAL MEDICINE

## 2024-08-13 PROCEDURE — 3078F DIAST BP <80 MM HG: CPT | Performed by: INTERNAL MEDICINE

## 2024-08-13 PROCEDURE — 4004F PT TOBACCO SCREEN RCVD TLK: CPT | Performed by: INTERNAL MEDICINE

## 2024-08-13 PROCEDURE — 1123F ACP DISCUSS/DSCN MKR DOCD: CPT | Performed by: INTERNAL MEDICINE

## 2024-08-13 PROCEDURE — G8427 DOCREV CUR MEDS BY ELIG CLIN: HCPCS | Performed by: INTERNAL MEDICINE

## 2024-08-13 PROCEDURE — 99205 OFFICE O/P NEW HI 60 MIN: CPT | Performed by: INTERNAL MEDICINE

## 2024-08-13 SDOH — ECONOMIC STABILITY: INCOME INSECURITY: HOW HARD IS IT FOR YOU TO PAY FOR THE VERY BASICS LIKE FOOD, HOUSING, MEDICAL CARE, AND HEATING?: NOT HARD AT ALL

## 2024-08-13 SDOH — ECONOMIC STABILITY: FOOD INSECURITY: WITHIN THE PAST 12 MONTHS, THE FOOD YOU BOUGHT JUST DIDN'T LAST AND YOU DIDN'T HAVE MONEY TO GET MORE.: NEVER TRUE

## 2024-08-13 SDOH — ECONOMIC STABILITY: FOOD INSECURITY: WITHIN THE PAST 12 MONTHS, YOU WORRIED THAT YOUR FOOD WOULD RUN OUT BEFORE YOU GOT MONEY TO BUY MORE.: NEVER TRUE

## 2024-08-13 ASSESSMENT — PATIENT HEALTH QUESTIONNAIRE - PHQ9
SUM OF ALL RESPONSES TO PHQ QUESTIONS 1-9: 0
SUM OF ALL RESPONSES TO PHQ QUESTIONS 1-9: 0
2. FEELING DOWN, DEPRESSED OR HOPELESS: NOT AT ALL
SUM OF ALL RESPONSES TO PHQ QUESTIONS 1-9: 0
SUM OF ALL RESPONSES TO PHQ QUESTIONS 1-9: 0
SUM OF ALL RESPONSES TO PHQ9 QUESTIONS 1 & 2: 0
1. LITTLE INTEREST OR PLEASURE IN DOING THINGS: NOT AT ALL

## 2024-08-13 NOTE — PROGRESS NOTES
HPI:  The patient is a 73 y.o. male who presents today to establish care.    Patient is accompanied by his wife today.  He has significant past medical history for Parkinson disease, alcoholism, chronic atrial fibrillation on chronic anticoagulation and hypertension.  The patient had been recently to neurologist and has been tried on the new formula of carbidopa levodopa Rytary.  His last INR last week was 2.5.  He denies any current shortness of breath chest pain abdominal pain nausea vomiting or diarrhea.  He has been very slow in walking using a cane.  He also has been having trouble with his right knee osteoarthritis.  His speech still soft and slow , No current falls.    Social history:  had 3 children  Retired from [employer].  Denies any tobacco or drug abuse  Admits to alcohol use which according to his wife he had a bout of alcohol rehabilitation about 4 years ago and currently he is drinking few drinks a week.    Family history: Coronary artery disease in his brother    No significant past surgical history      Past Medical History:   Diagnosis Date    A-fib (HCC)     Alcoholism (HCC)     HTN (hypertension)     Parkinson's disease (HCC)       Past Surgical History:   Procedure Laterality Date    INGUINAL HERNIA REPAIR Left     as a baby    KNEE SURGERY Right       Family History   Problem Relation Age of Onset    Depression Paternal Grandmother     Parkinsonism Maternal Cousin      Social History     Socioeconomic History    Marital status:      Spouse name: Not on file    Number of children: Not on file    Years of education: Not on file    Highest education level: Not on file   Occupational History    Not on file   Tobacco Use    Smoking status: Never    Smokeless tobacco: Current     Types: Chew   Substance and Sexual Activity    Alcohol use: Yes    Drug use: Never    Sexual activity: Not on file   Other Topics Concern    Not on file   Social History Narrative    Not on file     Social

## 2024-08-19 DIAGNOSIS — Z13.220 LIPID SCREENING: ICD-10-CM

## 2024-08-19 DIAGNOSIS — R53.83 OTHER FATIGUE: ICD-10-CM

## 2024-08-19 DIAGNOSIS — Z12.5 PROSTATE CANCER SCREENING: ICD-10-CM

## 2024-08-19 DIAGNOSIS — G20.B1 PARKINSON'S DISEASE WITH DYSKINESIA WITHOUT FLUCTUATING MANIFESTATIONS (HCC): ICD-10-CM

## 2024-08-19 LAB
ALBUMIN: 4.1 G/DL (ref 3.5–5.2)
ALP BLD-CCNC: 75 U/L (ref 40–129)
ALT SERPL-CCNC: 7 U/L (ref 0–40)
ANION GAP SERPL CALCULATED.3IONS-SCNC: 14 MMOL/L (ref 7–16)
AST SERPL-CCNC: 18 U/L (ref 0–39)
BASOPHILS ABSOLUTE: 0.05 K/UL (ref 0–0.2)
BASOPHILS RELATIVE PERCENT: 1 % (ref 0–2)
BILIRUB SERPL-MCNC: 0.7 MG/DL (ref 0–1.2)
BUN BLDV-MCNC: 15 MG/DL (ref 6–23)
CALCIUM SERPL-MCNC: 8.8 MG/DL (ref 8.6–10.2)
CHLORIDE BLD-SCNC: 105 MMOL/L (ref 98–107)
CHOLESTEROL, TOTAL: 179 MG/DL
CO2: 22 MMOL/L (ref 22–29)
CREAT SERPL-MCNC: 1 MG/DL (ref 0.7–1.2)
EOSINOPHILS ABSOLUTE: 0.17 K/UL (ref 0.05–0.5)
EOSINOPHILS RELATIVE PERCENT: 2 % (ref 0–6)
GFR, ESTIMATED: 77 ML/MIN/1.73M2
GLUCOSE BLD-MCNC: 93 MG/DL (ref 74–99)
HCT VFR BLD CALC: 49.5 % (ref 37–54)
HDLC SERPL-MCNC: 39 MG/DL
HEMOGLOBIN: 16.3 G/DL (ref 12.5–16.5)
IMMATURE GRANULOCYTES %: 0 % (ref 0–5)
IMMATURE GRANULOCYTES ABSOLUTE: <0.03 K/UL (ref 0–0.58)
LDL CHOLESTEROL: 114 MG/DL
LYMPHOCYTES ABSOLUTE: 1.58 K/UL (ref 1.5–4)
LYMPHOCYTES RELATIVE PERCENT: 22 % (ref 20–42)
MCH RBC QN AUTO: 29.4 PG (ref 26–35)
MCHC RBC AUTO-ENTMCNC: 32.9 G/DL (ref 32–34.5)
MCV RBC AUTO: 89.4 FL (ref 80–99.9)
MONOCYTES ABSOLUTE: 0.71 K/UL (ref 0.1–0.95)
MONOCYTES RELATIVE PERCENT: 10 % (ref 2–12)
NEUTROPHILS ABSOLUTE: 4.51 K/UL (ref 1.8–7.3)
NEUTROPHILS RELATIVE PERCENT: 64 % (ref 43–80)
PDW BLD-RTO: 13.6 % (ref 11.5–15)
PLATELET # BLD: 264 K/UL (ref 130–450)
PMV BLD AUTO: 10.2 FL (ref 7–12)
POTASSIUM SERPL-SCNC: 4.5 MMOL/L (ref 3.5–5)
PROSTATE SPECIFIC ANTIGEN: 0.34 NG/ML (ref 0–4)
RBC # BLD: 5.54 M/UL (ref 3.8–5.8)
SODIUM BLD-SCNC: 141 MMOL/L (ref 132–146)
TOTAL PROTEIN: 6.3 G/DL (ref 6.4–8.3)
TRIGL SERPL-MCNC: 129 MG/DL
TSH SERPL DL<=0.05 MIU/L-ACNC: 1.98 UIU/ML (ref 0.27–4.2)
VLDLC SERPL CALC-MCNC: 26 MG/DL
WBC # BLD: 7 K/UL (ref 4.5–11.5)

## 2024-10-01 ENCOUNTER — NURSE ONLY (OUTPATIENT)
Dept: PRIMARY CARE CLINIC | Age: 74
End: 2024-10-01

## 2024-10-01 DIAGNOSIS — Z79.01 LONG TERM (CURRENT) USE OF ANTICOAGULANTS: Primary | ICD-10-CM

## 2024-10-01 DIAGNOSIS — I48.20 CHRONIC ATRIAL FIBRILLATION (HCC): ICD-10-CM

## 2024-10-01 LAB — INTERNATIONAL NORMALIZATION RATIO, POC: 4.6

## 2024-10-30 ENCOUNTER — NURSE ONLY (OUTPATIENT)
Dept: PRIMARY CARE CLINIC | Age: 74
End: 2024-10-30

## 2024-10-30 DIAGNOSIS — Z79.01 LONG TERM (CURRENT) USE OF ANTICOAGULANTS: Primary | ICD-10-CM

## 2024-10-30 DIAGNOSIS — I48.20 CHRONIC ATRIAL FIBRILLATION (HCC): ICD-10-CM

## 2024-10-30 LAB — INTERNATIONAL NORMALIZATION RATIO, POC: 1.9

## 2024-10-30 NOTE — PROGRESS NOTES
Randy came in today 10/30/2024 to have his INR checked.  Today the INR was 1.98.  He has been taking Coumadin was taking 5 mg every day except Sunday 2.5 mg.  Per Dr. Valentine he is to take 5 mg daily and repeat inr in 2 weeks.

## 2024-11-18 ENCOUNTER — TELEPHONE (OUTPATIENT)
Dept: PRIMARY CARE CLINIC | Age: 74
End: 2024-11-18

## 2024-11-19 RX ORDER — WARFARIN SODIUM 5 MG/1
5 TABLET ORAL DAILY
Qty: 90 TABLET | Refills: 0 | Status: SHIPPED | OUTPATIENT
Start: 2024-11-19

## 2024-11-19 NOTE — TELEPHONE ENCOUNTER
Name of Medication(s) Requested:  Requested Prescriptions     Pending Prescriptions Disp Refills    warfarin (COUMADIN) 5 MG tablet 90 tablet 0     Sig: Take 1 tablet by mouth daily       Medication is on current medication list Yes    Dosage and directions were verified? Yes    Quantity verified: 90 day supply     Pharmacy Verified?  Yes    Last Appointment:  8/13/2024    Future appts:  Future Appointments   Date Time Provider Department Center   12/13/2024  9:30 AM Jaycee Valentine MD [facility]   2/10/2025 10:20 AM David Bull APRN - CNP [facility] NEURO Neurology -        (If no appt send self scheduling link. .REFILLAPPT)  Scheduling request sent?     [] Yes  [x] No    Does patient need updated?  [] Yes  [x] No

## 2024-12-04 ENCOUNTER — NURSE ONLY (OUTPATIENT)
Dept: PRIMARY CARE CLINIC | Age: 74
End: 2024-12-04
Payer: MEDICARE

## 2024-12-04 DIAGNOSIS — Z79.01 LONG TERM (CURRENT) USE OF ANTICOAGULANTS: ICD-10-CM

## 2024-12-04 DIAGNOSIS — I48.20 CHRONIC ATRIAL FIBRILLATION (HCC): Primary | ICD-10-CM

## 2024-12-04 LAB — INTERNATIONAL NORMALIZATION RATIO, POC: 3.1

## 2024-12-04 PROCEDURE — 93793 ANTICOAG MGMT PT WARFARIN: CPT | Performed by: INTERNAL MEDICINE

## 2024-12-04 PROCEDURE — 85610 PROTHROMBIN TIME: CPT | Performed by: INTERNAL MEDICINE

## 2025-01-13 ENCOUNTER — OFFICE VISIT (OUTPATIENT)
Dept: PRIMARY CARE CLINIC | Age: 75
End: 2025-01-13
Payer: MEDICARE

## 2025-01-13 VITALS
OXYGEN SATURATION: 99 % | HEART RATE: 76 BPM | DIASTOLIC BLOOD PRESSURE: 64 MMHG | SYSTOLIC BLOOD PRESSURE: 108 MMHG | HEIGHT: 70 IN | TEMPERATURE: 97.6 F | WEIGHT: 164.1 LBS | BODY MASS INDEX: 23.49 KG/M2

## 2025-01-13 DIAGNOSIS — Z79.01 CHRONIC ANTICOAGULATION: ICD-10-CM

## 2025-01-13 DIAGNOSIS — I10 BENIGN ESSENTIAL HTN: ICD-10-CM

## 2025-01-13 DIAGNOSIS — I48.20 CHRONIC ATRIAL FIBRILLATION (HCC): Primary | ICD-10-CM

## 2025-01-13 DIAGNOSIS — F10.20 ALCOHOLISM (HCC): ICD-10-CM

## 2025-01-13 DIAGNOSIS — E78.2 MIXED HYPERLIPIDEMIA: ICD-10-CM

## 2025-01-13 DIAGNOSIS — Z00.00 ENCOUNTER FOR ANNUAL WELLNESS EXAM IN MEDICARE PATIENT: ICD-10-CM

## 2025-01-13 DIAGNOSIS — G20.A1 PARKINSON'S DISEASE WITHOUT FLUCTUATING MANIFESTATIONS, UNSPECIFIED WHETHER DYSKINESIA PRESENT (HCC): ICD-10-CM

## 2025-01-13 LAB
INTERNATIONAL NORMALIZATION RATIO, POC: 2.4
PROTHROMBIN TIME, POC: 29.1

## 2025-01-13 PROCEDURE — G8427 DOCREV CUR MEDS BY ELIG CLIN: HCPCS | Performed by: INTERNAL MEDICINE

## 2025-01-13 PROCEDURE — 1125F AMNT PAIN NOTED PAIN PRSNT: CPT | Performed by: INTERNAL MEDICINE

## 2025-01-13 PROCEDURE — 3017F COLORECTAL CA SCREEN DOC REV: CPT | Performed by: INTERNAL MEDICINE

## 2025-01-13 PROCEDURE — 85610 PROTHROMBIN TIME: CPT | Performed by: INTERNAL MEDICINE

## 2025-01-13 PROCEDURE — 3078F DIAST BP <80 MM HG: CPT | Performed by: INTERNAL MEDICINE

## 2025-01-13 PROCEDURE — G8420 CALC BMI NORM PARAMETERS: HCPCS | Performed by: INTERNAL MEDICINE

## 2025-01-13 PROCEDURE — 1123F ACP DISCUSS/DSCN MKR DOCD: CPT | Performed by: INTERNAL MEDICINE

## 2025-01-13 PROCEDURE — 4004F PT TOBACCO SCREEN RCVD TLK: CPT | Performed by: INTERNAL MEDICINE

## 2025-01-13 PROCEDURE — G0439 PPPS, SUBSEQ VISIT: HCPCS | Performed by: INTERNAL MEDICINE

## 2025-01-13 PROCEDURE — 1160F RVW MEDS BY RX/DR IN RCRD: CPT | Performed by: INTERNAL MEDICINE

## 2025-01-13 PROCEDURE — 99213 OFFICE O/P EST LOW 20 MIN: CPT | Performed by: INTERNAL MEDICINE

## 2025-01-13 PROCEDURE — 1159F MED LIST DOCD IN RCRD: CPT | Performed by: INTERNAL MEDICINE

## 2025-01-13 PROCEDURE — 3074F SYST BP LT 130 MM HG: CPT | Performed by: INTERNAL MEDICINE

## 2025-01-13 RX ORDER — WARFARIN SODIUM 5 MG/1
5 TABLET ORAL DAILY
Qty: 90 TABLET | Refills: 0 | Status: SHIPPED | OUTPATIENT
Start: 2025-01-13

## 2025-01-13 RX ORDER — CARBIDOPA/LEVODOPA 25MG-250MG
1 TABLET ORAL
COMMUNITY
Start: 2024-11-20

## 2025-01-13 RX ORDER — VERAPAMIL HYDROCHLORIDE 300 MG/1
300 CAPSULE, EXTENDED RELEASE ORAL DAILY
Qty: 90 CAPSULE | Refills: 0 | Status: SHIPPED | OUTPATIENT
Start: 2025-01-13

## 2025-01-13 SDOH — ECONOMIC STABILITY: FOOD INSECURITY: WITHIN THE PAST 12 MONTHS, YOU WORRIED THAT YOUR FOOD WOULD RUN OUT BEFORE YOU GOT MONEY TO BUY MORE.: NEVER TRUE

## 2025-01-13 SDOH — ECONOMIC STABILITY: FOOD INSECURITY: WITHIN THE PAST 12 MONTHS, THE FOOD YOU BOUGHT JUST DIDN'T LAST AND YOU DIDN'T HAVE MONEY TO GET MORE.: NEVER TRUE

## 2025-01-13 ASSESSMENT — PATIENT HEALTH QUESTIONNAIRE - PHQ9
1. LITTLE INTEREST OR PLEASURE IN DOING THINGS: NOT AT ALL
SUM OF ALL RESPONSES TO PHQ QUESTIONS 1-9: 0
2. FEELING DOWN, DEPRESSED OR HOPELESS: NOT AT ALL
SUM OF ALL RESPONSES TO PHQ QUESTIONS 1-9: 0
SUM OF ALL RESPONSES TO PHQ QUESTIONS 1-9: 0
SUM OF ALL RESPONSES TO PHQ9 QUESTIONS 1 & 2: 0
SUM OF ALL RESPONSES TO PHQ QUESTIONS 1-9: 0

## 2025-01-13 ASSESSMENT — LIFESTYLE VARIABLES
HOW MANY STANDARD DRINKS CONTAINING ALCOHOL DO YOU HAVE ON A TYPICAL DAY: 1 OR 2
HOW OFTEN DO YOU HAVE A DRINK CONTAINING ALCOHOL: 2-4 TIMES A MONTH

## 2025-01-13 NOTE — PROGRESS NOTES
cyanosis, clubbing or edema  Musculoskeletal: normal range of motion, no joint swelling, deformity or tenderness  Muscle power in both upper and lower extremity 5/5 with some very fine tremors noted on bilateral hands and mild rigidity in upper extremities  Neurologic: reflexes normal and symmetric, no cranial nerve deficit, gait, coordination and speech normal            Allergies   Allergen Reactions    Demerol Hcl [Meperidine]      Prior to Visit Medications    Medication Sig Taking? Authorizing Provider   warfarin (COUMADIN) 5 MG tablet Take 1 tablet by mouth daily Yes Jaycee Valentine MD   Verapamil (VERELAN) 300 MG CP24 extended release capsule Take 1 capsule by mouth daily Yes Jaycee Valentine MD   carbidopa-levodopa (SINEMET)  MG per tablet Take 1 tablet by mouth every 4 hours (while awake) Yes Calli Viramontes MD   Vitamin D (CHOLECALCIFEROL) 25 MCG (1000 UT) TABS tablet Take 1 tablet by mouth daily Yes Calli Viramontes MD   Cholecalciferol (VITAMIN D3) 50 MCG (2000 UT) CAPS Take 1 capsule by mouth daily Yes Calli Viramontes MD       CareTeam (Including outside providers/suppliers regularly involved in providing care):   Patient Care Team:  Jaycee Valentine MD as PCP - General (Internal Medicine)  Jaycee Valentine MD as PCP - Empaneled Provider     Recommendations for Preventive Services Due: see orders and patient instructions/AVS.  Recommended screening schedule for the next 5-10 years is provided to the patient in written form: see Patient Instructions/AVS.     Reviewed and updated this visit:  Tobacco  Allergies  Meds  Problems  Med Hx  Surg Hx  Soc Hx  Fam Hx

## 2025-02-13 ENCOUNTER — NURSE ONLY (OUTPATIENT)
Dept: PRIMARY CARE CLINIC | Age: 75
End: 2025-02-13
Payer: MEDICARE

## 2025-02-13 DIAGNOSIS — I48.20 CHRONIC ATRIAL FIBRILLATION (HCC): Primary | ICD-10-CM

## 2025-02-13 DIAGNOSIS — Z79.01 LONG TERM (CURRENT) USE OF ANTICOAGULANTS: ICD-10-CM

## 2025-02-13 LAB — INTERNATIONAL NORMALIZATION RATIO, POC: 3.2

## 2025-02-13 PROCEDURE — 85610 PROTHROMBIN TIME: CPT | Performed by: INTERNAL MEDICINE

## 2025-02-13 PROCEDURE — 93793 ANTICOAG MGMT PT WARFARIN: CPT | Performed by: INTERNAL MEDICINE

## 2025-02-13 PROCEDURE — 36415 COLL VENOUS BLD VENIPUNCTURE: CPT | Performed by: INTERNAL MEDICINE

## 2025-03-26 ENCOUNTER — CLINICAL SUPPORT (OUTPATIENT)
Dept: PRIMARY CARE CLINIC | Age: 75
End: 2025-03-26
Payer: MEDICARE

## 2025-03-26 DIAGNOSIS — I48.20 CHRONIC ATRIAL FIBRILLATION (HCC): Primary | ICD-10-CM

## 2025-03-26 DIAGNOSIS — Z79.01 LONG TERM (CURRENT) USE OF ANTICOAGULANTS: ICD-10-CM

## 2025-03-26 LAB — INTERNATIONAL NORMALIZATION RATIO, POC: 2.8

## 2025-03-26 PROCEDURE — 93793 ANTICOAG MGMT PT WARFARIN: CPT | Performed by: INTERNAL MEDICINE

## 2025-03-26 PROCEDURE — 36415 COLL VENOUS BLD VENIPUNCTURE: CPT | Performed by: INTERNAL MEDICINE

## 2025-03-26 PROCEDURE — 85610 PROTHROMBIN TIME: CPT | Performed by: INTERNAL MEDICINE

## 2025-04-04 ENCOUNTER — HOSPITAL ENCOUNTER (EMERGENCY)
Age: 75
Discharge: HOME | End: 2025-04-04
Payer: MEDICARE

## 2025-04-04 ENCOUNTER — HOSPITAL ENCOUNTER (EMERGENCY)
Age: 75
Discharge: HOME | End: 2025-04-04
Attending: EMERGENCY MEDICINE
Payer: MEDICARE

## 2025-04-04 ENCOUNTER — APPOINTMENT (OUTPATIENT)
Dept: CT IMAGING | Age: 75
End: 2025-04-04
Payer: MEDICARE

## 2025-04-04 VITALS
SYSTOLIC BLOOD PRESSURE: 107 MMHG | RESPIRATION RATE: 17 BRPM | WEIGHT: 170 LBS | OXYGEN SATURATION: 97 % | TEMPERATURE: 97.7 F | BODY MASS INDEX: 24.39 KG/M2 | HEART RATE: 96 BPM | DIASTOLIC BLOOD PRESSURE: 72 MMHG

## 2025-04-04 VITALS
RESPIRATION RATE: 20 BRPM | OXYGEN SATURATION: 94 % | HEART RATE: 91 BPM | SYSTOLIC BLOOD PRESSURE: 102 MMHG | DIASTOLIC BLOOD PRESSURE: 64 MMHG

## 2025-04-04 DIAGNOSIS — G20.B1 PARKINSON'S DISEASE WITH DYSKINESIA, UNSPECIFIED WHETHER MANIFESTATIONS FLUCTUATE (HCC): ICD-10-CM

## 2025-04-04 DIAGNOSIS — S09.90XA INJURY OF HEAD, INITIAL ENCOUNTER: ICD-10-CM

## 2025-04-04 DIAGNOSIS — S01.81XA LACERATION OF FOREHEAD, INITIAL ENCOUNTER: ICD-10-CM

## 2025-04-04 DIAGNOSIS — S01.01XA LACERATION OF SCALP, INITIAL ENCOUNTER: Primary | ICD-10-CM

## 2025-04-04 DIAGNOSIS — S09.90XA INJURY OF HEAD, INITIAL ENCOUNTER: Primary | ICD-10-CM

## 2025-04-04 LAB
ANION GAP SERPL CALCULATED.3IONS-SCNC: 10 MMOL/L (ref 7–16)
BASOPHILS # BLD: 0.04 K/UL (ref 0–0.2)
BASOPHILS NFR BLD: 1 % (ref 0–2)
BUN SERPL-MCNC: 19 MG/DL (ref 6–23)
CALCIUM SERPL-MCNC: 8.9 MG/DL (ref 8.6–10.2)
CHLORIDE SERPL-SCNC: 103 MMOL/L (ref 98–107)
CO2 SERPL-SCNC: 23 MMOL/L (ref 22–29)
CREAT SERPL-MCNC: 0.8 MG/DL (ref 0.7–1.2)
EOSINOPHIL # BLD: 0.08 K/UL (ref 0.05–0.5)
EOSINOPHILS RELATIVE PERCENT: 1 % (ref 0–6)
ERYTHROCYTE [DISTWIDTH] IN BLOOD BY AUTOMATED COUNT: 13.2 % (ref 11.5–15)
GFR, ESTIMATED: >90 ML/MIN/1.73M2
GLUCOSE SERPL-MCNC: 102 MG/DL (ref 74–99)
HCT VFR BLD AUTO: 47.9 % (ref 37–54)
HGB BLD-MCNC: 16 G/DL (ref 12.5–16.5)
IMM GRANULOCYTES # BLD AUTO: <0.03 K/UL (ref 0–0.58)
IMM GRANULOCYTES NFR BLD: 0 % (ref 0–5)
INR PPP: 2.5
LYMPHOCYTES NFR BLD: 1.46 K/UL (ref 1.5–4)
LYMPHOCYTES RELATIVE PERCENT: 20 % (ref 20–42)
MCH RBC QN AUTO: 28.9 PG (ref 26–35)
MCHC RBC AUTO-ENTMCNC: 33.4 G/DL (ref 32–34.5)
MCV RBC AUTO: 86.5 FL (ref 80–99.9)
MONOCYTES NFR BLD: 0.73 K/UL (ref 0.1–0.95)
MONOCYTES NFR BLD: 10 % (ref 2–12)
NEUTROPHILS NFR BLD: 68 % (ref 43–80)
NEUTS SEG NFR BLD: 4.93 K/UL (ref 1.8–7.3)
PLATELET # BLD AUTO: 263 K/UL (ref 130–450)
PMV BLD AUTO: 9.9 FL (ref 7–12)
POTASSIUM SERPL-SCNC: 4.6 MMOL/L (ref 3.5–5)
PROTHROMBIN TIME: 26.8 SEC (ref 9.3–12.4)
RBC # BLD AUTO: 5.54 M/UL (ref 3.8–5.8)
SODIUM SERPL-SCNC: 136 MMOL/L (ref 132–146)
WBC OTHER # BLD: 7.3 K/UL (ref 4.5–11.5)

## 2025-04-04 PROCEDURE — 70450 CT HEAD/BRAIN W/O DYE: CPT

## 2025-04-04 PROCEDURE — 85610 PROTHROMBIN TIME: CPT

## 2025-04-04 PROCEDURE — 85025 COMPLETE CBC W/AUTO DIFF WBC: CPT

## 2025-04-04 PROCEDURE — 70486 CT MAXILLOFACIAL W/O DYE: CPT

## 2025-04-04 PROCEDURE — 99211 OFF/OP EST MAY X REQ PHY/QHP: CPT

## 2025-04-04 PROCEDURE — 72125 CT NECK SPINE W/O DYE: CPT

## 2025-04-04 PROCEDURE — 80048 BASIC METABOLIC PNL TOTAL CA: CPT

## 2025-04-04 ASSESSMENT — PAIN DESCRIPTION - LOCATION: LOCATION: FACE

## 2025-04-04 ASSESSMENT — PAIN DESCRIPTION - ORIENTATION: ORIENTATION: LEFT

## 2025-04-04 ASSESSMENT — PAIN - FUNCTIONAL ASSESSMENT: PAIN_FUNCTIONAL_ASSESSMENT: 0-10

## 2025-04-04 ASSESSMENT — PAIN DESCRIPTION - FREQUENCY: FREQUENCY: INTERMITTENT

## 2025-04-04 ASSESSMENT — PAIN DESCRIPTION - DESCRIPTORS: DESCRIPTORS: DISCOMFORT;SORE;OTHER (COMMENT)

## 2025-04-04 ASSESSMENT — LIFESTYLE VARIABLES
HOW OFTEN DO YOU HAVE A DRINK CONTAINING ALCOHOL: NEVER
HOW MANY STANDARD DRINKS CONTAINING ALCOHOL DO YOU HAVE ON A TYPICAL DAY: PATIENT DOES NOT DRINK

## 2025-04-04 NOTE — DISCHARGE INSTRUCTIONS
Return to the ER if you have worsening headache, vomiting, unable to keep down food or drink, difficulty walking, talking, or seeing, stiffness in your neck, or any other new or concerning symptoms.    Follow up with your primary care physician in 5-7 days for suture removal.

## 2025-04-04 NOTE — ED PROVIDER NOTES
Independent EVELYN Visit.        [facility] URGENT CARE  ED  Encounter Note  Admit Date/RoomTime: 2025  2:00 PM  ED Room:   NAME: Randy Ca  : 1950  MRN: [id number]  PCP: Jaycee Valentine MD    CHIEF COMPLAINT     Laceration (Laceration above left eye, fell about 45 minutes ago)    HISTORY OF PRESENT ILLNESS        Randy Ca is a 74 y.o. male who presents to the ED with complaints of a headache, scalp laceration, nose injury after a fall that occurred today.  Patient states he tripped while outside today approximately 45 minutes prior to arrival striking his face on the wood pile.  Patient states he sustained a forehead laceration and what appears to be a broken nose after falling face first into a wood pile.  Patient states he is on Coumadin.  Patient denies loss of consciousness, but does complain of a headache.  Patient denies nausea or dizziness.  Patient denies any other injuries other than facial trauma.  Patient believes he is up-to-date on tetanus as he had a fall approximately 3 years ago and believes he had a tetanus shot at that time.    REVIEW OF SYSTEMS     Pertinent positives and negatives are stated within HPI, all other systems reviewed and are negative.    Past Medical History:  has a past medical history of A-fib (HCC), Alcoholism (HCC), HTN (hypertension), and Parkinson's disease.  Surgical History:  has a past surgical history that includes knee surgery (Right) and Inguinal hernia repair (Left).  Social History:  reports that he has never smoked. His smokeless tobacco use includes chew. He reports current alcohol use. He reports that he does not use drugs.  Family History: family history includes Depression in his paternal grandmother; Parkinsonism in his maternal cousin.   Allergies: Demerol hcl [meperidine]  CURRENT MEDICATIONS       Discharge Medication List as of 2025  2:23 PM        CONTINUE these medications which have NOT CHANGED    Details   rotigotine  re-evaluation is most likely lower than the risk of death attributable to being in the hospital.     Plan of Care/Counseling:  Clay Overton NP reviewed today's visit with the patient in addition to providing specific details for the plan of care and counseling regarding the diagnosis and prognosis.  Questions are answered at this time and are agreeable with the plan.    ASSESSMENT     1. Laceration of scalp, initial encounter    2. Injury of head, initial encounter        DISPOSITION   Discharged to [facility] emergency department for reevaluation.  Patient condition is stable.    Discharge Instructions:   Patient referred to  [facility] Emergency ([facility])  [address] [place] [id number] [phone]  Go to   For reevaluation    NEW MEDICATIONS     Discharge Medication List as of 4/4/2025  2:23 PM        Electronically signed by ALISON Baires NP   DD: 4/4/25  **This report was transcribed using voice recognition software. Every effort was made to ensure accuracy; however, inadvertent computerized transcription errors may be present.  END OF ED PROVIDER NOTE      Clay Jacobson APRN - NP  04/04/25 1348

## 2025-04-04 NOTE — DISCHARGE INSTR - COC
Continuity of Care Form    Patient Name: Randy Ca   :  1950  MRN:  [id number]    Admit date:  2025  Discharge date:  ***    Code Status Order: No Order   Advance Directives:     Admitting Physician:  No admitting provider for patient encounter.  PCP: Jaycee Valentine MD    Discharging Nurse: ***  Discharging Hospital Unit/Room#:   Discharging Unit Phone Number: ***    Emergency Contact:   Extended Emergency Contact Information  Primary Emergency Contact: Mariya Ca  Home Phone: [phone]  Mobile Phone: [phone]  Relation: Spouse  Secondary Emergency Contact: Glendy Beaulieu  Home Phone: [phone]  Mobile Phone: [phone]  Relation: Child    Past Surgical History:  Past Surgical History:   Procedure Laterality Date    INGUINAL HERNIA REPAIR Left     as a baby    KNEE SURGERY Right        Immunization History:   Immunization History   Administered Date(s) Administered    COVID-19, PFIZER PURPLE top, DILUTE for use, (age 12 y+), 30mcg/0.3mL 2021, 2021    TD 5LF, TENIVAC, (age 7y+), IM, 0.5mL 10/18/2023       Active Problems:  Patient Active Problem List   Diagnosis Code    Parkinson's disease G20.A1    Alcoholism (HCC) F10.20    Benign essential HTN I10    Chronic atrial fibrillation (HCC) I48.20    Other fatigue R53.83    Mixed hyperlipidemia E78.2       Isolation/Infection:   Isolation            No Isolation          Patient Infection Status    None to display         Nurse Assessment:  Last Vital Signs: /64   Pulse 91   Resp 20   SpO2 94%     Last documented pain score (0-10 scale):    Last Weight:   Wt Readings from Last 1 Encounters:   25 77.1 kg (170 lb)     Mental Status:  {IP PT MENTAL STATUS:58081}    IV Access:  { MADALYN IV ACCESS:998438912}    Nursing Mobility/ADLs:  Walking   {CHP DME ADLs:791105436}  Transfer  {CHP DME ADLs:888343755}  Bathing  {CHP DME ADLs:979428537}  Dressing  {CHP DME ADLs:651035813}  Toileting  {CHP DME ADLs:618928954}  Feeding   {Prognosis:6811374910}    Condition at Discharge: { Patient Condition:987470251}    Rehab Potential (if transferring to Rehab): {Prognosis:5757718390}    Recommended Labs or Other Treatments After Discharge: ***    Physician Certification: I certify the above information and transfer of Randy Ca  is necessary for the continuing treatment of the diagnosis listed and that he requires {Admit to Appropriate Level of Care:21292} for {GREATER/LESS:780105575} 30 days.     Update Admission H&P: {CHP DME Changes in HandP:038044991}    PHYSICIAN SIGNATURE:  {Esignature:342068132}

## 2025-04-04 NOTE — ED PROVIDER NOTES
ED PROVIDER NOTE    Chief Complaint   Patient presents with    Head Injury     Has parkinsons and lost balance and hit head, lac above left eyebrow, no loc, no neck pain, c/o headache, no other c/o from the fall       HPI:  4/4/25,   Time: 3:20 PM EDT       Randy Ca is a 74 y.o. male presenting to the ED for fall and head injury. Patient has history of Parkinson's disease and falls frequently.  Today he was taking out the garbage and lost his balance and fell forward.  He did strike his head.  He is on warfarin for afib.  No LOC.  He did sustain a laceration to the left forehead.  No associated neck, back, chest, abdominal pain, hip pain.  Patient has been able to ambulate with a cane as he normally does since the fall.  No preceding dizziness or lightheadedness.  No recent illness.    Chart review: hx of afib on warfarin, HTN, parkinson's disease    Reviewed outpatient neurology progress note from 2/10/2025 by David Bull NP:  Dx idiopathic Parkinson's disease, alcoholism in remission, A-fib on warfarin.    Review of Systems:     Review of Systems  Pertinent positives and negatives as stated in HPI     --------------------------------------------- PAST HISTORY ---------------------------------------------  Past Medical History:   Past Medical History:   Diagnosis Date    A-fib (HCC)     Alcoholism (HCC)     HTN (hypertension)     Parkinson's disease        Past Surgical History:   Past Surgical History:   Procedure Laterality Date    INGUINAL HERNIA REPAIR Left     as a baby    KNEE SURGERY Right        Social History:   Social History     Socioeconomic History    Marital status:    Tobacco Use    Smoking status: Never    Smokeless tobacco: Current     Types: Chew   Substance and Sexual Activity    Alcohol use: Yes    Drug use: Never    Sexual activity: Defer     Social Drivers of Health     Financial Resource Strain: Low Risk  (8/13/2024)    Overall Financial Resource Strain (CARDIA)      Difficulty of Paying Living Expenses: Not hard at all   Food Insecurity: No Food Insecurity (1/13/2025)    Hunger Vital Sign     Worried About Running Out of Food in the Last Year: Never true     Ran Out of Food in the Last Year: Never true   Transportation Needs: No Transportation Needs (1/13/2025)    PRAPARE - Transportation     Lack of Transportation (Medical): No     Lack of Transportation (Non-Medical): No   Physical Activity: Inactive (1/13/2025)    Exercise Vital Sign     Days of Exercise per Week: 0 days     Minutes of Exercise per Session: 0 min   Housing Stability: Low Risk  (1/13/2025)    Housing Stability Vital Sign     Unable to Pay for Housing in the Last Year: No     Number of Times Moved in the Last Year: 0     Homeless in the Last Year: No       Family History:   Family History   Problem Relation Age of Onset    Depression Paternal Grandmother     Parkinsonism Maternal Cousin        The patient’s home medications have been reviewed.    Allergies:   Allergies   Allergen Reactions    Demerol Hcl [Meperidine]            ---------------------------------------------------PHYSICAL EXAM--------------------------------------    /64   Pulse 91   Resp 20   SpO2 94%     Physical Exam  Vitals and nursing note reviewed.   Constitutional:       General: He is not in acute distress.     Appearance: He is not toxic-appearing.   HENT:      Head:      Comments: 2 cm laceration to the left forehead.  No active bleeding.     Mouth/Throat:      Mouth: Mucous membranes are moist.   Eyes:      General: No scleral icterus.     Extraocular Movements: Extraocular movements intact.      Pupils: Pupils are equal, round, and reactive to light.   Cardiovascular:      Rate and Rhythm: Normal rate and regular rhythm.      Pulses: Normal pulses.      Heart sounds: Normal heart sounds. No murmur heard.  Pulmonary:      Effort: Pulmonary effort is normal. No respiratory distress.      Breath sounds: Normal breath sounds. No

## 2025-04-04 NOTE — DISCHARGE INSTRUCTIONS
Proceed directly to [facility] emergency department for reevaluation of a head injury and on blood thinners.

## 2025-05-06 DIAGNOSIS — I10 BENIGN ESSENTIAL HTN: ICD-10-CM

## 2025-05-06 DIAGNOSIS — I48.20 CHRONIC ATRIAL FIBRILLATION (HCC): ICD-10-CM

## 2025-05-06 NOTE — TELEPHONE ENCOUNTER
Name of Medication(s) Requested:  Requested Prescriptions     Pending Prescriptions Disp Refills    warfarin (COUMADIN) 5 MG tablet [Pharmacy Med Name: WARFARIN SODIUM 5 MG TABS 5 Tablet] 90 tablet 1     Sig: TAKE 1 TABLET BY MOUTH DAILY    Verapamil (VERELAN) 300 MG CP24 extended release capsule [Pharmacy Med Name: VERAPAMIL ER  MG  Capsule] 90 capsule 1     Sig: TAKE 1 CAPSULE BY MOUTH DAILY       Medication is on current medication list Yes    Dosage and directions were verified? Yes    Quantity verified: 90 day supply     Pharmacy Verified?  Yes    Last Appointment:  1/13/2025    Future appts:  Future Appointments   Date Time Provider Department Center   7/14/2025  9:30 AM [facility]   8/13/2025 10:00 AM David Bull APRN - CNP [facility] NEURO Neurology -        (If no appt send self scheduling link. .REFILLAPPT)  Scheduling request sent?     [] Yes  [] No    Does patient need updated?  [] Yes  [] No

## 2025-05-07 RX ORDER — WARFARIN SODIUM 5 MG/1
5 TABLET ORAL DAILY
Qty: 90 TABLET | Refills: 1 | Status: SHIPPED | OUTPATIENT
Start: 2025-05-07

## 2025-05-07 RX ORDER — VERAPAMIL HYDROCHLORIDE 300 MG/1
300 CAPSULE, EXTENDED RELEASE ORAL DAILY
Qty: 90 CAPSULE | Refills: 1 | Status: SHIPPED | OUTPATIENT
Start: 2025-05-07

## 2025-06-03 ENCOUNTER — CLINICAL SUPPORT (OUTPATIENT)
Dept: PRIMARY CARE CLINIC | Age: 75
End: 2025-06-03
Payer: MEDICARE

## 2025-06-03 DIAGNOSIS — I48.20 CHRONIC ATRIAL FIBRILLATION (HCC): Primary | ICD-10-CM

## 2025-06-03 DIAGNOSIS — Z79.01 LONG TERM (CURRENT) USE OF ANTICOAGULANTS: ICD-10-CM

## 2025-06-03 LAB — INTERNATIONAL NORMALIZATION RATIO, POC: 3.2

## 2025-06-03 PROCEDURE — 85610 PROTHROMBIN TIME: CPT | Performed by: INTERNAL MEDICINE

## 2025-06-03 PROCEDURE — 36415 COLL VENOUS BLD VENIPUNCTURE: CPT | Performed by: INTERNAL MEDICINE

## 2025-06-03 PROCEDURE — 93793 ANTICOAG MGMT PT WARFARIN: CPT | Performed by: INTERNAL MEDICINE

## 2025-07-14 ENCOUNTER — OFFICE VISIT (OUTPATIENT)
Dept: PRIMARY CARE CLINIC | Age: 75
End: 2025-07-14
Payer: MEDICARE

## 2025-07-14 VITALS
OXYGEN SATURATION: 98 % | HEART RATE: 65 BPM | SYSTOLIC BLOOD PRESSURE: 110 MMHG | HEIGHT: 70 IN | WEIGHT: 161.4 LBS | BODY MASS INDEX: 23.11 KG/M2 | DIASTOLIC BLOOD PRESSURE: 68 MMHG | TEMPERATURE: 97.5 F

## 2025-07-14 DIAGNOSIS — I10 BENIGN ESSENTIAL HTN: ICD-10-CM

## 2025-07-14 DIAGNOSIS — I48.20 CHRONIC ATRIAL FIBRILLATION (HCC): Primary | ICD-10-CM

## 2025-07-14 DIAGNOSIS — K59.09 CHRONIC CONSTIPATION: ICD-10-CM

## 2025-07-14 DIAGNOSIS — Z79.01 ENCOUNTER FOR MONITORING COUMADIN THERAPY: ICD-10-CM

## 2025-07-14 DIAGNOSIS — E78.2 MIXED HYPERLIPIDEMIA: ICD-10-CM

## 2025-07-14 DIAGNOSIS — G20.A1 PARKINSON'S DISEASE WITHOUT DYSKINESIA OR FLUCTUATING MANIFESTATIONS (HCC): ICD-10-CM

## 2025-07-14 DIAGNOSIS — Z51.81 ENCOUNTER FOR MONITORING COUMADIN THERAPY: ICD-10-CM

## 2025-07-14 DIAGNOSIS — F10.20 ALCOHOLISM (HCC): ICD-10-CM

## 2025-07-14 LAB
INTERNATIONAL NORMALIZATION RATIO, POC: 2.8
PROTHROMBIN TIME, POC: 33.3

## 2025-07-14 PROCEDURE — 3078F DIAST BP <80 MM HG: CPT | Performed by: INTERNAL MEDICINE

## 2025-07-14 PROCEDURE — 1123F ACP DISCUSS/DSCN MKR DOCD: CPT | Performed by: INTERNAL MEDICINE

## 2025-07-14 PROCEDURE — G8427 DOCREV CUR MEDS BY ELIG CLIN: HCPCS | Performed by: INTERNAL MEDICINE

## 2025-07-14 PROCEDURE — G8420 CALC BMI NORM PARAMETERS: HCPCS | Performed by: INTERNAL MEDICINE

## 2025-07-14 PROCEDURE — 3017F COLORECTAL CA SCREEN DOC REV: CPT | Performed by: INTERNAL MEDICINE

## 2025-07-14 PROCEDURE — 1159F MED LIST DOCD IN RCRD: CPT | Performed by: INTERNAL MEDICINE

## 2025-07-14 PROCEDURE — 1126F AMNT PAIN NOTED NONE PRSNT: CPT | Performed by: INTERNAL MEDICINE

## 2025-07-14 PROCEDURE — 85610 PROTHROMBIN TIME: CPT | Performed by: INTERNAL MEDICINE

## 2025-07-14 PROCEDURE — 1160F RVW MEDS BY RX/DR IN RCRD: CPT | Performed by: INTERNAL MEDICINE

## 2025-07-14 PROCEDURE — 99214 OFFICE O/P EST MOD 30 MIN: CPT | Performed by: INTERNAL MEDICINE

## 2025-07-14 PROCEDURE — 4004F PT TOBACCO SCREEN RCVD TLK: CPT | Performed by: INTERNAL MEDICINE

## 2025-07-14 PROCEDURE — 3074F SYST BP LT 130 MM HG: CPT | Performed by: INTERNAL MEDICINE

## 2025-07-14 NOTE — PROGRESS NOTES
Chief Complaint   Patient presents with    6 Month Follow-Up     Patient is a routine visit and has labs from April on file to review.        HPI:  Patient is here for follow-up of chronic atrial fibrillation, benign essential hypertension Parkinson disease mixed hyperlipidemia on chronic anticoagulation.  Patient is doing okay he is using a cane.  He denies any current problems that are new.    Blood work from April was discussed with patient all appears within normal limits.  Today his PT/INR is 33.3/2.8.  Patient is on 5 mg of Coumadin daily and he was instructed to continue with the same current dose.  He denies any current bleeding from his rectum or any urinary symptoms.    .  Patient complains of constipation and he thinks it might be secondary to his Parkinson disease since he is not moving that much.  He was advised to use MiraLAX over-the-counter 1 scoop daily.    He also had a colonoscopy done by Dr. Cutler few years ago..    Past Medical History, Surgical History, and Family History has been reviewed and updated.    Review of Systems:  Constitutional:  No fever, no fatigue, no chills, no headaches, no weight change  Dermatology:  No rash, no mole, no dry or sensitive skin  ENT:  No cough, no sore throat, no sinus pain, no runny nose, no ear pain  Cardiology:  No chest pain, no palpitations, no leg edema, no shortness of breath, no PND  Gastroenterology:  No dysphagia, no abdominal pain, no nausea, no vomiting, no constipation, no diarrhea, no heartburn  Musculoskeletal:  No joint pain, no leg cramps, no back pain, no muscle aches  Respiratory:  No shortness of breath, no orthopnea, no wheezing, no OCHOA, no hemoptysis  Urology:  No blood in the urine, no urinary frequency, no urinary incontinence, no urinary urgency, no nocturia, no dysuria    Vitals:    07/14/25 0931   BP: 110/68   Pulse: 65   Temp: 97.5 °F (36.4 °C)   TempSrc: Temporal   SpO2: 98%   Weight: 73.2 kg (161 lb 6.4 oz)   Height: 1.778 m (5'

## 2025-08-13 ENCOUNTER — OFFICE VISIT (OUTPATIENT)
Age: 75
End: 2025-08-13

## 2025-08-13 VITALS
WEIGHT: 161 LBS | OXYGEN SATURATION: 95 % | DIASTOLIC BLOOD PRESSURE: 59 MMHG | SYSTOLIC BLOOD PRESSURE: 105 MMHG | HEART RATE: 64 BPM | TEMPERATURE: 97.6 F | BODY MASS INDEX: 23.1 KG/M2

## 2025-08-13 DIAGNOSIS — K59.09 CHRONIC CONSTIPATION: ICD-10-CM

## 2025-08-13 DIAGNOSIS — R53.81 PHYSICAL DECONDITIONING: ICD-10-CM

## 2025-08-13 DIAGNOSIS — G20.A2 PARKINSON'S DISEASE WITHOUT DYSKINESIA, WITH FLUCTUATING MANIFESTATIONS (HCC): Primary | ICD-10-CM

## 2025-08-13 RX ORDER — SENNOSIDES 8.6 MG
1 TABLET ORAL DAILY
Qty: 90 TABLET | Refills: 3 | Status: SHIPPED | OUTPATIENT
Start: 2025-08-13 | End: 2026-08-08

## 2025-08-13 RX ORDER — ROTIGOTINE 3 MG/24H
3 PATCH, EXTENDED RELEASE TRANSDERMAL DAILY
Qty: 30 PATCH | Refills: 11 | Status: SHIPPED | OUTPATIENT
Start: 2025-08-13